# Patient Record
Sex: FEMALE | Race: WHITE | NOT HISPANIC OR LATINO | URBAN - METROPOLITAN AREA
[De-identification: names, ages, dates, MRNs, and addresses within clinical notes are randomized per-mention and may not be internally consistent; named-entity substitution may affect disease eponyms.]

---

## 2022-01-01 ENCOUNTER — INPATIENT (INPATIENT)
Facility: HOSPITAL | Age: 57
LOS: 11 days | End: 2022-08-11
Attending: INTERNAL MEDICINE | Admitting: INTERNAL MEDICINE

## 2022-01-01 ENCOUNTER — INPATIENT (INPATIENT)
Facility: HOSPITAL | Age: 57
LOS: 7 days | Discharge: HOME | End: 2022-07-22
Attending: INTERNAL MEDICINE | Admitting: INTERNAL MEDICINE

## 2022-01-01 ENCOUNTER — TRANSCRIPTION ENCOUNTER (OUTPATIENT)
Age: 57
End: 2022-01-01

## 2022-01-01 ENCOUNTER — RESULT REVIEW (OUTPATIENT)
Age: 57
End: 2022-01-01

## 2022-01-01 VITALS
SYSTOLIC BLOOD PRESSURE: 200 MMHG | DIASTOLIC BLOOD PRESSURE: 93 MMHG | HEIGHT: 65 IN | TEMPERATURE: 97 F | WEIGHT: 293 LBS | HEART RATE: 110 BPM | OXYGEN SATURATION: 97 % | RESPIRATION RATE: 20 BRPM

## 2022-01-01 VITALS
TEMPERATURE: 97 F | HEIGHT: 65 IN | RESPIRATION RATE: 20 BRPM | SYSTOLIC BLOOD PRESSURE: 115 MMHG | WEIGHT: 293 LBS | OXYGEN SATURATION: 99 % | HEART RATE: 89 BPM | DIASTOLIC BLOOD PRESSURE: 51 MMHG

## 2022-01-01 VITALS — RESPIRATION RATE: 28 BRPM

## 2022-01-01 VITALS — DIASTOLIC BLOOD PRESSURE: 79 MMHG | SYSTOLIC BLOOD PRESSURE: 175 MMHG | HEART RATE: 84 BPM

## 2022-01-01 DIAGNOSIS — E87.2 ACIDOSIS: ICD-10-CM

## 2022-01-01 DIAGNOSIS — Z66 DO NOT RESUSCITATE: ICD-10-CM

## 2022-01-01 DIAGNOSIS — B95.1 STREPTOCOCCUS, GROUP B, AS THE CAUSE OF DISEASES CLASSIFIED ELSEWHERE: ICD-10-CM

## 2022-01-01 DIAGNOSIS — E83.52 HYPERCALCEMIA: ICD-10-CM

## 2022-01-01 DIAGNOSIS — Z79.890 HORMONE REPLACEMENT THERAPY: ICD-10-CM

## 2022-01-01 DIAGNOSIS — E66.01 MORBID (SEVERE) OBESITY DUE TO EXCESS CALORIES: ICD-10-CM

## 2022-01-01 DIAGNOSIS — I10 ESSENTIAL (PRIMARY) HYPERTENSION: ICD-10-CM

## 2022-01-01 DIAGNOSIS — C56.3 MALIGNANT NEOPLASM OF BILATERAL OVARIES: ICD-10-CM

## 2022-01-01 DIAGNOSIS — D50.9 IRON DEFICIENCY ANEMIA, UNSPECIFIED: ICD-10-CM

## 2022-01-01 DIAGNOSIS — D75.839 THROMBOCYTOSIS, UNSPECIFIED: ICD-10-CM

## 2022-01-01 DIAGNOSIS — M32.9 SYSTEMIC LUPUS ERYTHEMATOSUS, UNSPECIFIED: ICD-10-CM

## 2022-01-01 DIAGNOSIS — R65.10 SYSTEMIC INFLAMMATORY RESPONSE SYNDROME (SIRS) OF NON-INFECTIOUS ORIGIN WITHOUT ACUTE ORGAN DYSFUNCTION: ICD-10-CM

## 2022-01-01 DIAGNOSIS — E03.9 HYPOTHYROIDISM, UNSPECIFIED: ICD-10-CM

## 2022-01-01 DIAGNOSIS — U07.1 COVID-19: ICD-10-CM

## 2022-01-01 DIAGNOSIS — C85.90 NON-HODGKIN LYMPHOMA, UNSPECIFIED, UNSPECIFIED SITE: ICD-10-CM

## 2022-01-01 DIAGNOSIS — D72.829 ELEVATED WHITE BLOOD CELL COUNT, UNSPECIFIED: ICD-10-CM

## 2022-01-01 DIAGNOSIS — C78.02 SECONDARY MALIGNANT NEOPLASM OF LEFT LUNG: ICD-10-CM

## 2022-01-01 DIAGNOSIS — N17.9 ACUTE KIDNEY FAILURE, UNSPECIFIED: ICD-10-CM

## 2022-01-01 DIAGNOSIS — C80.1 MALIGNANT (PRIMARY) NEOPLASM, UNSPECIFIED: ICD-10-CM

## 2022-01-01 DIAGNOSIS — R19.7 DIARRHEA, UNSPECIFIED: ICD-10-CM

## 2022-01-01 DIAGNOSIS — R65.21 SEVERE SEPSIS WITH SEPTIC SHOCK: ICD-10-CM

## 2022-01-01 DIAGNOSIS — N17.0 ACUTE KIDNEY FAILURE WITH TUBULAR NECROSIS: ICD-10-CM

## 2022-01-01 DIAGNOSIS — D84.9 IMMUNODEFICIENCY, UNSPECIFIED: ICD-10-CM

## 2022-01-01 DIAGNOSIS — J69.0 PNEUMONITIS DUE TO INHALATION OF FOOD AND VOMIT: ICD-10-CM

## 2022-01-01 DIAGNOSIS — E83.39 OTHER DISORDERS OF PHOSPHORUS METABOLISM: ICD-10-CM

## 2022-01-01 DIAGNOSIS — R11.2 NAUSEA WITH VOMITING, UNSPECIFIED: ICD-10-CM

## 2022-01-01 DIAGNOSIS — E87.1 HYPO-OSMOLALITY AND HYPONATREMIA: ICD-10-CM

## 2022-01-01 DIAGNOSIS — E83.42 HYPOMAGNESEMIA: ICD-10-CM

## 2022-01-01 DIAGNOSIS — J12.82 PNEUMONIA DUE TO CORONAVIRUS DISEASE 2019: ICD-10-CM

## 2022-01-01 DIAGNOSIS — D68.61 ANTIPHOSPHOLIPID SYNDROME: ICD-10-CM

## 2022-01-01 DIAGNOSIS — R82.71 BACTERIURIA: ICD-10-CM

## 2022-01-01 DIAGNOSIS — R19.00 INTRA-ABDOMINAL AND PELVIC SWELLING, MASS AND LUMP, UNSPECIFIED SITE: ICD-10-CM

## 2022-01-01 DIAGNOSIS — N39.0 URINARY TRACT INFECTION, SITE NOT SPECIFIED: ICD-10-CM

## 2022-01-01 DIAGNOSIS — E87.6 HYPOKALEMIA: ICD-10-CM

## 2022-01-01 DIAGNOSIS — G92.8 OTHER TOXIC ENCEPHALOPATHY: ICD-10-CM

## 2022-01-01 DIAGNOSIS — C79.82 SECONDARY MALIGNANT NEOPLASM OF GENITAL ORGANS: ICD-10-CM

## 2022-01-01 DIAGNOSIS — E46 UNSPECIFIED PROTEIN-CALORIE MALNUTRITION: ICD-10-CM

## 2022-01-01 DIAGNOSIS — J96.01 ACUTE RESPIRATORY FAILURE WITH HYPOXIA: ICD-10-CM

## 2022-01-01 DIAGNOSIS — A41.9 SEPSIS, UNSPECIFIED ORGANISM: ICD-10-CM

## 2022-01-01 DIAGNOSIS — R94.5 ABNORMAL RESULTS OF LIVER FUNCTION STUDIES: ICD-10-CM

## 2022-01-01 LAB
24R-OH-CALCIDIOL SERPL-MCNC: 74 NG/ML — SIGNIFICANT CHANGE UP (ref 30–80)
ACANTHOCYTES BLD QL SMEAR: SIGNIFICANT CHANGE UP
ACANTHOCYTES BLD QL SMEAR: SLIGHT — SIGNIFICANT CHANGE UP
ALBUMIN SERPL ELPH-MCNC: 2.3 G/DL — LOW (ref 3.5–5.2)
ALBUMIN SERPL ELPH-MCNC: 2.5 G/DL — LOW (ref 3.5–5.2)
ALBUMIN SERPL ELPH-MCNC: 2.6 G/DL — LOW (ref 3.5–5.2)
ALBUMIN SERPL ELPH-MCNC: 2.8 G/DL — LOW (ref 3.5–5.2)
ALBUMIN SERPL ELPH-MCNC: 3 G/DL — LOW (ref 3.5–5.2)
ALBUMIN SERPL ELPH-MCNC: 3.1 G/DL — LOW (ref 3.5–5.2)
ALBUMIN SERPL ELPH-MCNC: 3.3 G/DL — LOW (ref 3.5–5.2)
ALBUMIN SERPL ELPH-MCNC: 3.4 G/DL — LOW (ref 3.5–5.2)
ALBUMIN SERPL ELPH-MCNC: 3.4 G/DL — LOW (ref 3.5–5.2)
ALBUMIN SERPL ELPH-MCNC: 3.8 G/DL — SIGNIFICANT CHANGE UP (ref 3.5–5.2)
ALBUMIN SERPL ELPH-MCNC: 4 G/DL — SIGNIFICANT CHANGE UP (ref 3.5–5.2)
ALP SERPL-CCNC: 1396 U/L — HIGH (ref 30–115)
ALP SERPL-CCNC: 1465 U/L — HIGH (ref 30–115)
ALP SERPL-CCNC: 150 U/L — HIGH (ref 30–115)
ALP SERPL-CCNC: 158 U/L — HIGH (ref 30–115)
ALP SERPL-CCNC: 1588 U/L — HIGH (ref 30–115)
ALP SERPL-CCNC: 163 U/L — HIGH (ref 30–115)
ALP SERPL-CCNC: 166 U/L — HIGH (ref 30–115)
ALP SERPL-CCNC: 170 U/L — HIGH (ref 30–115)
ALP SERPL-CCNC: 177 U/L — HIGH (ref 30–115)
ALP SERPL-CCNC: 178 U/L — HIGH (ref 30–115)
ALP SERPL-CCNC: 181 U/L — HIGH (ref 30–115)
ALP SERPL-CCNC: 197 U/L — HIGH (ref 30–115)
ALP SERPL-CCNC: 378 U/L — HIGH (ref 30–115)
ALP SERPL-CCNC: 451 U/L — HIGH (ref 30–115)
ALP SERPL-CCNC: 457 U/L — HIGH (ref 30–115)
ALT FLD-CCNC: 20 U/L — SIGNIFICANT CHANGE UP (ref 0–41)
ALT FLD-CCNC: 21 U/L — SIGNIFICANT CHANGE UP (ref 0–41)
ALT FLD-CCNC: 22 U/L — SIGNIFICANT CHANGE UP (ref 0–41)
ALT FLD-CCNC: 22 U/L — SIGNIFICANT CHANGE UP (ref 0–41)
ALT FLD-CCNC: 24 U/L — SIGNIFICANT CHANGE UP (ref 0–41)
ALT FLD-CCNC: 25 U/L — SIGNIFICANT CHANGE UP (ref 0–41)
ALT FLD-CCNC: 26 U/L — SIGNIFICANT CHANGE UP (ref 0–41)
ALT FLD-CCNC: 27 U/L — SIGNIFICANT CHANGE UP (ref 0–41)
ALT FLD-CCNC: 27 U/L — SIGNIFICANT CHANGE UP (ref 0–41)
ALT FLD-CCNC: 28 U/L — SIGNIFICANT CHANGE UP (ref 0–41)
ALT FLD-CCNC: 34 U/L — SIGNIFICANT CHANGE UP (ref 0–41)
ALT FLD-CCNC: 35 U/L — SIGNIFICANT CHANGE UP (ref 0–41)
ALT FLD-CCNC: 36 U/L — SIGNIFICANT CHANGE UP (ref 0–41)
ALT FLD-CCNC: 44 U/L — HIGH (ref 0–41)
ALT FLD-CCNC: 62 U/L — HIGH (ref 0–41)
ANION GAP SERPL CALC-SCNC: 11 MMOL/L — SIGNIFICANT CHANGE UP (ref 7–14)
ANION GAP SERPL CALC-SCNC: 12 MMOL/L — SIGNIFICANT CHANGE UP (ref 7–14)
ANION GAP SERPL CALC-SCNC: 12 MMOL/L — SIGNIFICANT CHANGE UP (ref 7–14)
ANION GAP SERPL CALC-SCNC: 13 MMOL/L — SIGNIFICANT CHANGE UP (ref 7–14)
ANION GAP SERPL CALC-SCNC: 15 MMOL/L — HIGH (ref 7–14)
ANION GAP SERPL CALC-SCNC: 15 MMOL/L — HIGH (ref 7–14)
ANION GAP SERPL CALC-SCNC: 17 MMOL/L — HIGH (ref 7–14)
ANION GAP SERPL CALC-SCNC: 18 MMOL/L — HIGH (ref 7–14)
ANION GAP SERPL CALC-SCNC: 19 MMOL/L — HIGH (ref 7–14)
ANION GAP SERPL CALC-SCNC: 20 MMOL/L — HIGH (ref 7–14)
ANION GAP SERPL CALC-SCNC: 20 MMOL/L — HIGH (ref 7–14)
ANION GAP SERPL CALC-SCNC: 21 MMOL/L — HIGH (ref 7–14)
ANION GAP SERPL CALC-SCNC: 23 MMOL/L — HIGH (ref 7–14)
ANION GAP SERPL CALC-SCNC: 23 MMOL/L — HIGH (ref 7–14)
ANION GAP SERPL CALC-SCNC: 24 MMOL/L — HIGH (ref 7–14)
ANION GAP SERPL CALC-SCNC: 24 MMOL/L — HIGH (ref 7–14)
ANION GAP SERPL CALC-SCNC: 26 MMOL/L — HIGH (ref 7–14)
ANION GAP SERPL CALC-SCNC: 27 MMOL/L — HIGH (ref 7–14)
ANION GAP SERPL CALC-SCNC: 32 MMOL/L — HIGH (ref 7–14)
ANISOCYTOSIS BLD QL: SIGNIFICANT CHANGE UP
ANISOCYTOSIS BLD QL: SLIGHT — SIGNIFICANT CHANGE UP
APPEARANCE UR: ABNORMAL
APPEARANCE UR: ABNORMAL
APTT BLD: 29 SEC — SIGNIFICANT CHANGE UP (ref 27–39.2)
APTT BLD: 30.4 SEC — SIGNIFICANT CHANGE UP (ref 27–39.2)
APTT BLD: 46.3 SEC — HIGH (ref 27–39.2)
AST SERPL-CCNC: 101 U/L — HIGH (ref 0–41)
AST SERPL-CCNC: 27 U/L — SIGNIFICANT CHANGE UP (ref 0–41)
AST SERPL-CCNC: 27 U/L — SIGNIFICANT CHANGE UP (ref 0–41)
AST SERPL-CCNC: 273 U/L — HIGH (ref 0–41)
AST SERPL-CCNC: 28 U/L — SIGNIFICANT CHANGE UP (ref 0–41)
AST SERPL-CCNC: 31 U/L — SIGNIFICANT CHANGE UP (ref 0–41)
AST SERPL-CCNC: 32 U/L — SIGNIFICANT CHANGE UP (ref 0–41)
AST SERPL-CCNC: 39 U/L — SIGNIFICANT CHANGE UP (ref 0–41)
AST SERPL-CCNC: 45 U/L — HIGH (ref 0–41)
AST SERPL-CCNC: 50 U/L — HIGH (ref 0–41)
AST SERPL-CCNC: 58 U/L — HIGH (ref 0–41)
AST SERPL-CCNC: 58 U/L — HIGH (ref 0–41)
AST SERPL-CCNC: 59 U/L — HIGH (ref 0–41)
AST SERPL-CCNC: 637 U/L — HIGH (ref 0–41)
AST SERPL-CCNC: 73 U/L — HIGH (ref 0–41)
BACTERIA # UR AUTO: ABNORMAL
BACTERIA # UR AUTO: ABNORMAL
BASE EXCESS BLDA CALC-SCNC: -20.1 MMOL/L — LOW (ref -2–3)
BASE EXCESS BLDV CALC-SCNC: -3.9 MMOL/L — LOW (ref -2–3)
BASOPHILS # BLD AUTO: 0 K/UL — SIGNIFICANT CHANGE UP (ref 0–0.2)
BASOPHILS # BLD AUTO: 0.04 K/UL — SIGNIFICANT CHANGE UP (ref 0–0.2)
BASOPHILS # BLD AUTO: 0.06 K/UL — SIGNIFICANT CHANGE UP (ref 0–0.2)
BASOPHILS # BLD AUTO: 0.07 K/UL — SIGNIFICANT CHANGE UP (ref 0–0.2)
BASOPHILS # BLD AUTO: 0.07 K/UL — SIGNIFICANT CHANGE UP (ref 0–0.2)
BASOPHILS # BLD AUTO: 0.08 K/UL — SIGNIFICANT CHANGE UP (ref 0–0.2)
BASOPHILS # BLD AUTO: 0.36 K/UL — HIGH (ref 0–0.2)
BASOPHILS NFR BLD AUTO: 0 % — SIGNIFICANT CHANGE UP (ref 0–1)
BASOPHILS NFR BLD AUTO: 0.1 % — SIGNIFICANT CHANGE UP (ref 0–1)
BASOPHILS NFR BLD AUTO: 0.2 % — SIGNIFICANT CHANGE UP (ref 0–1)
BASOPHILS NFR BLD AUTO: 0.2 % — SIGNIFICANT CHANGE UP (ref 0–1)
BASOPHILS NFR BLD AUTO: 0.6 % — SIGNIFICANT CHANGE UP (ref 0–1)
BASOPHILS NFR BLD AUTO: 0.8 % — SIGNIFICANT CHANGE UP (ref 0–1)
BCA 255 TISS QL IMSTN: 52.2 U/ML — HIGH
BCR/ABL BY RT - PCR QUANTITATIVE: SIGNIFICANT CHANGE UP
BILIRUB DIRECT SERPL-MCNC: 1 MG/DL — HIGH (ref 0–0.3)
BILIRUB DIRECT SERPL-MCNC: <0.2 MG/DL — SIGNIFICANT CHANGE UP (ref 0–0.3)
BILIRUB DIRECT SERPL-MCNC: <0.2 MG/DL — SIGNIFICANT CHANGE UP (ref 0–0.3)
BILIRUB INDIRECT FLD-MCNC: 0.3 MG/DL — SIGNIFICANT CHANGE UP (ref 0.2–1.2)
BILIRUB INDIRECT FLD-MCNC: >0.1 MG/DL — LOW (ref 0.2–1.2)
BILIRUB INDIRECT FLD-MCNC: >0.2 MG/DL — SIGNIFICANT CHANGE UP (ref 0.2–1.2)
BILIRUB SERPL-MCNC: 0.2 MG/DL — SIGNIFICANT CHANGE UP (ref 0.2–1.2)
BILIRUB SERPL-MCNC: 0.3 MG/DL — SIGNIFICANT CHANGE UP (ref 0.2–1.2)
BILIRUB SERPL-MCNC: 0.4 MG/DL — SIGNIFICANT CHANGE UP (ref 0.2–1.2)
BILIRUB SERPL-MCNC: 0.5 MG/DL — SIGNIFICANT CHANGE UP (ref 0.2–1.2)
BILIRUB SERPL-MCNC: 0.5 MG/DL — SIGNIFICANT CHANGE UP (ref 0.2–1.2)
BILIRUB SERPL-MCNC: 0.7 MG/DL — SIGNIFICANT CHANGE UP (ref 0.2–1.2)
BILIRUB SERPL-MCNC: 1.3 MG/DL — HIGH (ref 0.2–1.2)
BILIRUB SERPL-MCNC: 1.3 MG/DL — HIGH (ref 0.2–1.2)
BILIRUB SERPL-MCNC: 1.4 MG/DL — HIGH (ref 0.2–1.2)
BILIRUB UR-MCNC: ABNORMAL
BILIRUB UR-MCNC: ABNORMAL
BLD GP AB SCN SERPL QL: SIGNIFICANT CHANGE UP
BUN SERPL-MCNC: 12 MG/DL — SIGNIFICANT CHANGE UP (ref 10–20)
BUN SERPL-MCNC: 13 MG/DL — SIGNIFICANT CHANGE UP (ref 10–20)
BUN SERPL-MCNC: 14 MG/DL — SIGNIFICANT CHANGE UP (ref 10–20)
BUN SERPL-MCNC: 15 MG/DL — SIGNIFICANT CHANGE UP (ref 10–20)
BUN SERPL-MCNC: 16 MG/DL — SIGNIFICANT CHANGE UP (ref 10–20)
BUN SERPL-MCNC: 19 MG/DL — SIGNIFICANT CHANGE UP (ref 10–20)
BUN SERPL-MCNC: 20 MG/DL — SIGNIFICANT CHANGE UP (ref 10–20)
BUN SERPL-MCNC: 21 MG/DL — HIGH (ref 10–20)
BUN SERPL-MCNC: 21 MG/DL — HIGH (ref 10–20)
BUN SERPL-MCNC: 22 MG/DL — HIGH (ref 10–20)
BUN SERPL-MCNC: 22 MG/DL — HIGH (ref 10–20)
BUN SERPL-MCNC: 23 MG/DL — HIGH (ref 10–20)
BUN SERPL-MCNC: 24 MG/DL — HIGH (ref 10–20)
BUN SERPL-MCNC: 25 MG/DL — HIGH (ref 10–20)
BUN SERPL-MCNC: 26 MG/DL — HIGH (ref 10–20)
BUN SERPL-MCNC: 26 MG/DL — HIGH (ref 10–20)
BUN SERPL-MCNC: 27 MG/DL — HIGH (ref 10–20)
BUN SERPL-MCNC: 28 MG/DL — HIGH (ref 10–20)
BUN SERPL-MCNC: 36 MG/DL — HIGH (ref 10–20)
BUN SERPL-MCNC: 50 MG/DL — HIGH (ref 10–20)
BUN SERPL-MCNC: 53 MG/DL — HIGH (ref 10–20)
BURR CELLS BLD QL SMEAR: PRESENT — SIGNIFICANT CHANGE UP
C DIFF BY PCR RESULT: NEGATIVE — SIGNIFICANT CHANGE UP
C DIFF TOX GENS STL QL NAA+PROBE: SIGNIFICANT CHANGE UP
CA-I BLD-SCNC: 6.2 MG/DL — HIGH (ref 4.5–5.6)
CA-I BLD-SCNC: 6.2 MG/DL — HIGH (ref 4.5–5.6)
CA-I SERPL-SCNC: 1.64 MMOL/L — CRITICAL HIGH (ref 1.15–1.33)
CALCIUM SERPL-MCNC: 10.4 MG/DL — HIGH (ref 8.5–10.1)
CALCIUM SERPL-MCNC: 10.8 MG/DL — HIGH (ref 8.5–10.1)
CALCIUM SERPL-MCNC: 10.8 MG/DL — HIGH (ref 8.5–10.1)
CALCIUM SERPL-MCNC: 11 MG/DL — HIGH (ref 8.5–10.1)
CALCIUM SERPL-MCNC: 11.1 MG/DL — HIGH (ref 8.5–10.1)
CALCIUM SERPL-MCNC: 11.7 MG/DL — HIGH (ref 8.5–10.1)
CALCIUM SERPL-MCNC: 12.1 MG/DL — HIGH (ref 8.5–10.1)
CALCIUM SERPL-MCNC: 12.2 MG/DL — HIGH (ref 8.4–10.5)
CALCIUM SERPL-MCNC: 12.5 MG/DL — HIGH (ref 8.5–10.1)
CALCIUM SERPL-MCNC: 13.3 MG/DL — HIGH (ref 8.5–10.1)
CALCIUM SERPL-MCNC: 7.6 MG/DL — LOW (ref 8.5–10.1)
CALCIUM SERPL-MCNC: 7.6 MG/DL — LOW (ref 8.5–10.1)
CALCIUM SERPL-MCNC: 7.7 MG/DL — LOW (ref 8.5–10.1)
CALCIUM SERPL-MCNC: 7.8 MG/DL — LOW (ref 8.5–10.1)
CALCIUM SERPL-MCNC: 8 MG/DL — LOW (ref 8.5–10.1)
CALCIUM SERPL-MCNC: 8 MG/DL — LOW (ref 8.5–10.1)
CALCIUM SERPL-MCNC: 8.2 MG/DL — LOW (ref 8.5–10.1)
CALCIUM SERPL-MCNC: 8.3 MG/DL — LOW (ref 8.5–10.1)
CALCIUM SERPL-MCNC: 8.4 MG/DL — LOW (ref 8.5–10.1)
CALCIUM SERPL-MCNC: 8.6 MG/DL — SIGNIFICANT CHANGE UP (ref 8.5–10.1)
CALRETICULIN INTERPRETATION: SIGNIFICANT CHANGE UP
CANCER AG125 SERPL-ACNC: 33 U/ML — SIGNIFICANT CHANGE UP
CANCER AG19-9 SERPL-ACNC: 19 U/ML — SIGNIFICANT CHANGE UP
CANCER AG27-29 SERPL-ACNC: 196.5 U/ML — HIGH (ref 0–38.6)
CEA SERPL-MCNC: 5.5 NG/ML — HIGH (ref 0–3.8)
CHLORIDE SERPL-SCNC: 100 MMOL/L — SIGNIFICANT CHANGE UP (ref 98–110)
CHLORIDE SERPL-SCNC: 101 MMOL/L — SIGNIFICANT CHANGE UP (ref 98–110)
CHLORIDE SERPL-SCNC: 101 MMOL/L — SIGNIFICANT CHANGE UP (ref 98–110)
CHLORIDE SERPL-SCNC: 91 MMOL/L — LOW (ref 98–110)
CHLORIDE SERPL-SCNC: 91 MMOL/L — LOW (ref 98–110)
CHLORIDE SERPL-SCNC: 92 MMOL/L — LOW (ref 98–110)
CHLORIDE SERPL-SCNC: 94 MMOL/L — LOW (ref 98–110)
CHLORIDE SERPL-SCNC: 94 MMOL/L — LOW (ref 98–110)
CHLORIDE SERPL-SCNC: 95 MMOL/L — LOW (ref 98–110)
CHLORIDE SERPL-SCNC: 96 MMOL/L — LOW (ref 98–110)
CHLORIDE SERPL-SCNC: 97 MMOL/L — LOW (ref 98–110)
CHLORIDE SERPL-SCNC: 98 MMOL/L — SIGNIFICANT CHANGE UP (ref 98–110)
CHLORIDE SERPL-SCNC: 99 MMOL/L — SIGNIFICANT CHANGE UP (ref 98–110)
CK SERPL-CCNC: 161 U/L — SIGNIFICANT CHANGE UP (ref 0–225)
CK SERPL-CCNC: 441 U/L — HIGH (ref 0–225)
CO2 SERPL-SCNC: 10 MMOL/L — LOW (ref 17–32)
CO2 SERPL-SCNC: 14 MMOL/L — LOW (ref 17–32)
CO2 SERPL-SCNC: 18 MMOL/L — SIGNIFICANT CHANGE UP (ref 17–32)
CO2 SERPL-SCNC: 19 MMOL/L — SIGNIFICANT CHANGE UP (ref 17–32)
CO2 SERPL-SCNC: 19 MMOL/L — SIGNIFICANT CHANGE UP (ref 17–32)
CO2 SERPL-SCNC: 20 MMOL/L — SIGNIFICANT CHANGE UP (ref 17–32)
CO2 SERPL-SCNC: 20 MMOL/L — SIGNIFICANT CHANGE UP (ref 17–32)
CO2 SERPL-SCNC: 21 MMOL/L — SIGNIFICANT CHANGE UP (ref 17–32)
CO2 SERPL-SCNC: 22 MMOL/L — SIGNIFICANT CHANGE UP (ref 17–32)
CO2 SERPL-SCNC: 22 MMOL/L — SIGNIFICANT CHANGE UP (ref 17–32)
CO2 SERPL-SCNC: 23 MMOL/L — SIGNIFICANT CHANGE UP (ref 17–32)
CO2 SERPL-SCNC: 24 MMOL/L — SIGNIFICANT CHANGE UP (ref 17–32)
CO2 SERPL-SCNC: 26 MMOL/L — SIGNIFICANT CHANGE UP (ref 17–32)
CO2 SERPL-SCNC: 8 MMOL/L — CRITICAL LOW (ref 17–32)
COLOR SPEC: YELLOW — SIGNIFICANT CHANGE UP
COLOR SPEC: YELLOW — SIGNIFICANT CHANGE UP
CREAT SERPL-MCNC: 0.8 MG/DL — SIGNIFICANT CHANGE UP (ref 0.7–1.5)
CREAT SERPL-MCNC: 0.9 MG/DL — SIGNIFICANT CHANGE UP (ref 0.7–1.5)
CREAT SERPL-MCNC: 1 MG/DL — SIGNIFICANT CHANGE UP (ref 0.7–1.5)
CREAT SERPL-MCNC: 1.1 MG/DL — SIGNIFICANT CHANGE UP (ref 0.7–1.5)
CREAT SERPL-MCNC: 1.2 MG/DL — SIGNIFICANT CHANGE UP (ref 0.7–1.5)
CREAT SERPL-MCNC: 1.2 MG/DL — SIGNIFICANT CHANGE UP (ref 0.7–1.5)
CREAT SERPL-MCNC: 1.3 MG/DL — SIGNIFICANT CHANGE UP (ref 0.7–1.5)
CREAT SERPL-MCNC: 1.4 MG/DL — SIGNIFICANT CHANGE UP (ref 0.7–1.5)
CREAT SERPL-MCNC: 1.4 MG/DL — SIGNIFICANT CHANGE UP (ref 0.7–1.5)
CREAT SERPL-MCNC: 1.5 MG/DL — SIGNIFICANT CHANGE UP (ref 0.7–1.5)
CREAT SERPL-MCNC: 1.8 MG/DL — HIGH (ref 0.7–1.5)
CREAT SERPL-MCNC: 2.2 MG/DL — HIGH (ref 0.7–1.5)
CREAT SERPL-MCNC: 3 MG/DL — HIGH (ref 0.7–1.5)
CREAT SERPL-MCNC: 3.2 MG/DL — HIGH (ref 0.7–1.5)
CRP SERPL-MCNC: 300.2 MG/L — HIGH
CULTURE RESULTS: SIGNIFICANT CHANGE UP
D DIMER BLD IA.RAPID-MCNC: 1595 NG/ML DDU — HIGH (ref 0–230)
D DIMER BLD IA.RAPID-MCNC: 2880 NG/ML DDU — HIGH (ref 0–230)
D DIMER BLD IA.RAPID-MCNC: 588 NG/ML DDU — HIGH (ref 0–230)
DACRYOCYTES BLD QL SMEAR: SIGNIFICANT CHANGE UP
DIFF PNL FLD: ABNORMAL
DIFF PNL FLD: ABNORMAL
DIR ANTIGLOB POLYSPECIFIC INTERPRETATION: SIGNIFICANT CHANGE UP
EGFR: 16 ML/MIN/1.73M2 — LOW
EGFR: 18 ML/MIN/1.73M2 — LOW
EGFR: 26 ML/MIN/1.73M2 — LOW
EGFR: 32 ML/MIN/1.73M2 — LOW
EGFR: 40 ML/MIN/1.73M2 — LOW
EGFR: 44 ML/MIN/1.73M2 — LOW
EGFR: 44 ML/MIN/1.73M2 — LOW
EGFR: 48 ML/MIN/1.73M2 — LOW
EGFR: 53 ML/MIN/1.73M2 — LOW
EGFR: 53 ML/MIN/1.73M2 — LOW
EGFR: 59 ML/MIN/1.73M2 — LOW
EGFR: 66 ML/MIN/1.73M2 — SIGNIFICANT CHANGE UP
EGFR: 75 ML/MIN/1.73M2 — SIGNIFICANT CHANGE UP
EGFR: 86 ML/MIN/1.73M2 — SIGNIFICANT CHANGE UP
EOSINOPHIL # BLD AUTO: 0 K/UL — SIGNIFICANT CHANGE UP (ref 0–0.7)
EOSINOPHIL # BLD AUTO: 0.06 K/UL — SIGNIFICANT CHANGE UP (ref 0–0.7)
EOSINOPHIL # BLD AUTO: 0.09 K/UL — SIGNIFICANT CHANGE UP (ref 0–0.7)
EOSINOPHIL # BLD AUTO: 0.1 K/UL — SIGNIFICANT CHANGE UP (ref 0–0.7)
EOSINOPHIL # BLD AUTO: 0.12 K/UL — SIGNIFICANT CHANGE UP (ref 0–0.7)
EOSINOPHIL # BLD AUTO: 0.17 K/UL — SIGNIFICANT CHANGE UP (ref 0–0.7)
EOSINOPHIL # BLD AUTO: 0.18 K/UL — SIGNIFICANT CHANGE UP (ref 0–0.7)
EOSINOPHIL # BLD AUTO: 0.49 K/UL — SIGNIFICANT CHANGE UP (ref 0–0.7)
EOSINOPHIL # BLD AUTO: 1.6 K/UL — HIGH (ref 0–0.7)
EOSINOPHIL NFR BLD AUTO: 0 % — SIGNIFICANT CHANGE UP (ref 0–8)
EOSINOPHIL NFR BLD AUTO: 0.1 % — SIGNIFICANT CHANGE UP (ref 0–8)
EOSINOPHIL NFR BLD AUTO: 0.2 % — SIGNIFICANT CHANGE UP (ref 0–8)
EOSINOPHIL NFR BLD AUTO: 0.3 % — SIGNIFICANT CHANGE UP (ref 0–8)
EOSINOPHIL NFR BLD AUTO: 0.7 % — SIGNIFICANT CHANGE UP (ref 0–8)
EOSINOPHIL NFR BLD AUTO: 0.9 % — SIGNIFICANT CHANGE UP (ref 0–8)
EOSINOPHIL NFR BLD AUTO: 4.1 % — SIGNIFICANT CHANGE UP (ref 0–8)
EPI CELLS # UR: 5 /HPF — SIGNIFICANT CHANGE UP (ref 0–5)
EPI CELLS # UR: ABNORMAL /HPF
ERYTHROCYTE [SEDIMENTATION RATE] IN BLOOD: 80 MM/HR — HIGH (ref 0–20)
FERRITIN SERPL-MCNC: 714 NG/ML — HIGH (ref 15–150)
FERRITIN SERPL-MCNC: 85 NG/ML — SIGNIFICANT CHANGE UP (ref 15–150)
FERRITIN SERPL-MCNC: 87 NG/ML — SIGNIFICANT CHANGE UP (ref 15–150)
FIBRINOGEN PPP-MCNC: 272 MG/DL — SIGNIFICANT CHANGE UP (ref 204.4–570.6)
FOLATE SERPL-MCNC: 6.7 NG/ML — SIGNIFICANT CHANGE UP
GAS PNL BLDA: SIGNIFICANT CHANGE UP
GAS PNL BLDV: 136 MMOL/L — SIGNIFICANT CHANGE UP (ref 136–145)
GAS PNL BLDV: SIGNIFICANT CHANGE UP
GIANT PLATELETS BLD QL SMEAR: PRESENT — SIGNIFICANT CHANGE UP
GLUCOSE BLDC GLUCOMTR-MCNC: 101 MG/DL — HIGH (ref 70–99)
GLUCOSE BLDC GLUCOMTR-MCNC: 102 MG/DL — HIGH (ref 70–99)
GLUCOSE BLDC GLUCOMTR-MCNC: 102 MG/DL — HIGH (ref 70–99)
GLUCOSE BLDC GLUCOMTR-MCNC: 103 MG/DL — HIGH (ref 70–99)
GLUCOSE BLDC GLUCOMTR-MCNC: 103 MG/DL — HIGH (ref 70–99)
GLUCOSE BLDC GLUCOMTR-MCNC: 104 MG/DL — HIGH (ref 70–99)
GLUCOSE BLDC GLUCOMTR-MCNC: 104 MG/DL — HIGH (ref 70–99)
GLUCOSE BLDC GLUCOMTR-MCNC: 105 MG/DL — HIGH (ref 70–99)
GLUCOSE BLDC GLUCOMTR-MCNC: 106 MG/DL — HIGH (ref 70–99)
GLUCOSE BLDC GLUCOMTR-MCNC: 108 MG/DL — HIGH (ref 70–99)
GLUCOSE BLDC GLUCOMTR-MCNC: 121 MG/DL — HIGH (ref 70–99)
GLUCOSE BLDC GLUCOMTR-MCNC: 122 MG/DL — HIGH (ref 70–99)
GLUCOSE BLDC GLUCOMTR-MCNC: 74 MG/DL — SIGNIFICANT CHANGE UP (ref 70–99)
GLUCOSE BLDC GLUCOMTR-MCNC: 80 MG/DL — SIGNIFICANT CHANGE UP (ref 70–99)
GLUCOSE BLDC GLUCOMTR-MCNC: 84 MG/DL — SIGNIFICANT CHANGE UP (ref 70–99)
GLUCOSE BLDC GLUCOMTR-MCNC: 88 MG/DL — SIGNIFICANT CHANGE UP (ref 70–99)
GLUCOSE BLDC GLUCOMTR-MCNC: 94 MG/DL — SIGNIFICANT CHANGE UP (ref 70–99)
GLUCOSE BLDC GLUCOMTR-MCNC: 95 MG/DL — SIGNIFICANT CHANGE UP (ref 70–99)
GLUCOSE BLDC GLUCOMTR-MCNC: 96 MG/DL — SIGNIFICANT CHANGE UP (ref 70–99)
GLUCOSE BLDC GLUCOMTR-MCNC: 96 MG/DL — SIGNIFICANT CHANGE UP (ref 70–99)
GLUCOSE BLDC GLUCOMTR-MCNC: 97 MG/DL — SIGNIFICANT CHANGE UP (ref 70–99)
GLUCOSE BLDC GLUCOMTR-MCNC: 99 MG/DL — SIGNIFICANT CHANGE UP (ref 70–99)
GLUCOSE SERPL-MCNC: 102 MG/DL — HIGH (ref 70–99)
GLUCOSE SERPL-MCNC: 104 MG/DL — HIGH (ref 70–99)
GLUCOSE SERPL-MCNC: 110 MG/DL — HIGH (ref 70–99)
GLUCOSE SERPL-MCNC: 255 MG/DL — HIGH (ref 70–99)
GLUCOSE SERPL-MCNC: 34 MG/DL — CRITICAL LOW (ref 70–99)
GLUCOSE SERPL-MCNC: 43 MG/DL — CRITICAL LOW (ref 70–99)
GLUCOSE SERPL-MCNC: 49 MG/DL — CRITICAL LOW (ref 70–99)
GLUCOSE SERPL-MCNC: 52 MG/DL — CRITICAL LOW (ref 70–99)
GLUCOSE SERPL-MCNC: 60 MG/DL — LOW (ref 70–99)
GLUCOSE SERPL-MCNC: 65 MG/DL — LOW (ref 70–99)
GLUCOSE SERPL-MCNC: 66 MG/DL — LOW (ref 70–99)
GLUCOSE SERPL-MCNC: 72 MG/DL — SIGNIFICANT CHANGE UP (ref 70–99)
GLUCOSE SERPL-MCNC: 74 MG/DL — SIGNIFICANT CHANGE UP (ref 70–99)
GLUCOSE SERPL-MCNC: 74 MG/DL — SIGNIFICANT CHANGE UP (ref 70–99)
GLUCOSE SERPL-MCNC: 76 MG/DL — SIGNIFICANT CHANGE UP (ref 70–99)
GLUCOSE SERPL-MCNC: 79 MG/DL — SIGNIFICANT CHANGE UP (ref 70–99)
GLUCOSE SERPL-MCNC: 84 MG/DL — SIGNIFICANT CHANGE UP (ref 70–99)
GLUCOSE SERPL-MCNC: 87 MG/DL — SIGNIFICANT CHANGE UP (ref 70–99)
GLUCOSE SERPL-MCNC: 90 MG/DL — SIGNIFICANT CHANGE UP (ref 70–99)
GLUCOSE SERPL-MCNC: 92 MG/DL — SIGNIFICANT CHANGE UP (ref 70–99)
GLUCOSE SERPL-MCNC: 92 MG/DL — SIGNIFICANT CHANGE UP (ref 70–99)
GLUCOSE SERPL-MCNC: 93 MG/DL — SIGNIFICANT CHANGE UP (ref 70–99)
GLUCOSE SERPL-MCNC: 97 MG/DL — SIGNIFICANT CHANGE UP (ref 70–99)
GLUCOSE UR QL: NEGATIVE MG/DL — SIGNIFICANT CHANGE UP
GLUCOSE UR QL: NEGATIVE — SIGNIFICANT CHANGE UP
GRAM STN FLD: SIGNIFICANT CHANGE UP
HAPTOGLOB SERPL-MCNC: 262 MG/DL — HIGH (ref 34–200)
HCG SERPL QL: NEGATIVE — SIGNIFICANT CHANGE UP
HCG SERPL QL: NEGATIVE — SIGNIFICANT CHANGE UP
HCG SERPL-ACNC: <0.6 MIU/ML — SIGNIFICANT CHANGE UP
HCO3 BLDA-SCNC: 10 MMOL/L — CRITICAL LOW (ref 21–28)
HCO3 BLDV-SCNC: 19 MMOL/L — LOW (ref 22–29)
HCT VFR BLD CALC: 17.7 % — LOW (ref 37–47)
HCT VFR BLD CALC: 25.2 % — LOW (ref 37–47)
HCT VFR BLD CALC: 26 % — LOW (ref 37–47)
HCT VFR BLD CALC: 26.1 % — LOW (ref 37–47)
HCT VFR BLD CALC: 26.2 % — LOW (ref 37–47)
HCT VFR BLD CALC: 26.5 % — LOW (ref 37–47)
HCT VFR BLD CALC: 26.8 % — LOW (ref 37–47)
HCT VFR BLD CALC: 27 % — LOW (ref 37–47)
HCT VFR BLD CALC: 27.3 % — LOW (ref 37–47)
HCT VFR BLD CALC: 27.6 % — LOW (ref 37–47)
HCT VFR BLD CALC: 27.7 % — LOW (ref 37–47)
HCT VFR BLD CALC: 27.7 % — LOW (ref 37–47)
HCT VFR BLD CALC: 27.9 % — LOW (ref 37–47)
HCT VFR BLD CALC: 28.1 % — LOW (ref 37–47)
HCT VFR BLD CALC: 28.7 % — LOW (ref 37–47)
HCT VFR BLD CALC: 29.1 % — LOW (ref 37–47)
HCT VFR BLD CALC: 29.3 % — LOW (ref 37–47)
HCT VFR BLD CALC: 30.1 % — LOW (ref 37–47)
HCT VFR BLD CALC: 30.3 % — LOW (ref 37–47)
HCT VFR BLDA CALC: 40 % — SIGNIFICANT CHANGE UP (ref 39–51)
HCV AB S/CO SERPL IA: 0.03 COI — SIGNIFICANT CHANGE UP
HCV AB SERPL-IMP: SIGNIFICANT CHANGE UP
HGB BLD CALC-MCNC: 13.2 G/DL — SIGNIFICANT CHANGE UP (ref 12.6–17.4)
HGB BLD-MCNC: 5 G/DL — CRITICAL LOW (ref 12–16)
HGB BLD-MCNC: 8.4 G/DL — LOW (ref 12–16)
HGB BLD-MCNC: 8.5 G/DL — LOW (ref 12–16)
HGB BLD-MCNC: 8.6 G/DL — LOW (ref 12–16)
HGB BLD-MCNC: 8.8 G/DL — LOW (ref 12–16)
HGB BLD-MCNC: 8.8 G/DL — LOW (ref 12–16)
HGB BLD-MCNC: 8.9 G/DL — LOW (ref 12–16)
HGB BLD-MCNC: 8.9 G/DL — LOW (ref 12–16)
HGB BLD-MCNC: 9 G/DL — LOW (ref 12–16)
HGB BLD-MCNC: 9.1 G/DL — LOW (ref 12–16)
HGB BLD-MCNC: 9.3 G/DL — LOW (ref 12–16)
HGB BLD-MCNC: 9.4 G/DL — LOW (ref 12–16)
HGB BLD-MCNC: 9.5 G/DL — LOW (ref 12–16)
HGB BLD-MCNC: 9.6 G/DL — LOW (ref 12–16)
HGB BLD-MCNC: 9.8 G/DL — LOW (ref 12–16)
HGB BLD-MCNC: 9.9 G/DL — LOW (ref 12–16)
HOROWITZ INDEX BLDA+IHG-RTO: 100 — SIGNIFICANT CHANGE UP
HYALINE CASTS # UR AUTO: 3 /LPF — SIGNIFICANT CHANGE UP (ref 0–7)
HYPOCHROMIA BLD QL: SLIGHT — SIGNIFICANT CHANGE UP
HYPOCHROMIA BLD QL: SLIGHT — SIGNIFICANT CHANGE UP
HYPOGRAN NEUTS BLD QL SMEAR: PRESENT — SIGNIFICANT CHANGE UP
IMM GRANULOCYTES NFR BLD AUTO: 2 % — HIGH (ref 0.1–0.3)
IMM GRANULOCYTES NFR BLD AUTO: 2.2 % — HIGH (ref 0.1–0.3)
IMM GRANULOCYTES NFR BLD AUTO: 4.6 % — HIGH (ref 0.1–0.3)
IMM GRANULOCYTES NFR BLD AUTO: 6.6 % — HIGH (ref 0.1–0.3)
IMM GRANULOCYTES NFR BLD AUTO: 7.4 % — HIGH (ref 0.1–0.3)
IMM GRANULOCYTES NFR BLD AUTO: 7.4 % — HIGH (ref 0.1–0.3)
IMM GRANULOCYTES NFR BLD AUTO: 7.5 % — HIGH (ref 0.1–0.3)
IMM GRANULOCYTES NFR BLD AUTO: 9.1 % — HIGH (ref 0.1–0.3)
INHIBIN A SERPL-MCNC: 9.9 PG/ML — SIGNIFICANT CHANGE UP
INHIBIN B SERUM: 18.8 PG/ML — HIGH (ref 0–16.9)
INR BLD: 1.13 RATIO — SIGNIFICANT CHANGE UP (ref 0.65–1.3)
INR BLD: 1.4 RATIO — HIGH (ref 0.65–1.3)
INR BLD: 1.86 RATIO — HIGH (ref 0.65–1.3)
IRON SATN MFR SERPL: 10 % — LOW (ref 15–50)
IRON SATN MFR SERPL: 27 UG/DL — LOW (ref 35–150)
IRON SATN MFR SERPL: 31 UG/DL — LOW (ref 35–150)
IRON SATN MFR SERPL: 9 % — LOW (ref 15–50)
JAK2 P.V617F BLD/T QL: SIGNIFICANT CHANGE UP
KETONES UR-MCNC: 40
KETONES UR-MCNC: SIGNIFICANT CHANGE UP
LACTATE BLDV-MCNC: 1.9 MMOL/L — SIGNIFICANT CHANGE UP (ref 0.5–2)
LACTATE SERPL-SCNC: 12.2 MMOL/L — CRITICAL HIGH (ref 0.7–2)
LACTATE SERPL-SCNC: 2.2 MMOL/L — HIGH (ref 0.7–2)
LDH SERPL L TO P-CCNC: 1571 — HIGH (ref 50–242)
LDH SERPL L TO P-CCNC: 669 — HIGH (ref 50–242)
LDH SERPL L TO P-CCNC: >2500 — HIGH (ref 50–242)
LEUKOCYTE ESTERASE UR-ACNC: ABNORMAL
LEUKOCYTE ESTERASE UR-ACNC: ABNORMAL
LIDOCAIN IGE QN: 34 U/L — SIGNIFICANT CHANGE UP (ref 7–60)
LYMPHOCYTES # BLD AUTO: 0.34 K/UL — LOW (ref 1.2–3.4)
LYMPHOCYTES # BLD AUTO: 1.32 K/UL — SIGNIFICANT CHANGE UP (ref 1.2–3.4)
LYMPHOCYTES # BLD AUTO: 1.75 K/UL — SIGNIFICANT CHANGE UP (ref 1.2–3.4)
LYMPHOCYTES # BLD AUTO: 1.79 K/UL — SIGNIFICANT CHANGE UP (ref 1.2–3.4)
LYMPHOCYTES # BLD AUTO: 2.2 K/UL — SIGNIFICANT CHANGE UP (ref 1.2–3.4)
LYMPHOCYTES # BLD AUTO: 2.39 K/UL — SIGNIFICANT CHANGE UP (ref 1.2–3.4)
LYMPHOCYTES # BLD AUTO: 3 K/UL — SIGNIFICANT CHANGE UP (ref 1.2–3.4)
LYMPHOCYTES # BLD AUTO: 3.29 K/UL — SIGNIFICANT CHANGE UP (ref 1.2–3.4)
LYMPHOCYTES # BLD AUTO: 3.68 K/UL — HIGH (ref 1.2–3.4)
LYMPHOCYTES # BLD AUTO: 3.99 K/UL — HIGH (ref 1.2–3.4)
LYMPHOCYTES # BLD AUTO: 4 % — LOW (ref 20.5–51.1)
LYMPHOCYTES # BLD AUTO: 4.07 K/UL — HIGH (ref 1.2–3.4)
LYMPHOCYTES # BLD AUTO: 4.4 % — LOW (ref 20.5–51.1)
LYMPHOCYTES # BLD AUTO: 4.4 % — LOW (ref 20.5–51.1)
LYMPHOCYTES # BLD AUTO: 5.6 % — LOW (ref 20.5–51.1)
LYMPHOCYTES # BLD AUTO: 6.1 % — LOW (ref 20.5–51.1)
LYMPHOCYTES # BLD AUTO: 6.1 % — LOW (ref 20.5–51.1)
LYMPHOCYTES # BLD AUTO: 6.7 % — LOW (ref 20.5–51.1)
LYMPHOCYTES # BLD AUTO: 7.1 % — LOW (ref 20.5–51.1)
LYMPHOCYTES # BLD AUTO: 7.1 % — LOW (ref 20.5–51.1)
LYMPHOCYTES # BLD AUTO: 7.4 % — LOW (ref 20.5–51.1)
LYMPHOCYTES # BLD AUTO: 81.7 % — HIGH (ref 20.5–51.1)
MACROCYTES BLD QL: SLIGHT — SIGNIFICANT CHANGE UP
MACROCYTES BLD QL: SLIGHT — SIGNIFICANT CHANGE UP
MAGNESIUM SERPL-MCNC: 1.6 MG/DL — LOW (ref 1.8–2.4)
MAGNESIUM SERPL-MCNC: 1.7 MG/DL — LOW (ref 1.8–2.4)
MAGNESIUM SERPL-MCNC: 1.8 MG/DL — SIGNIFICANT CHANGE UP (ref 1.8–2.4)
MAGNESIUM SERPL-MCNC: 1.9 MG/DL — SIGNIFICANT CHANGE UP (ref 1.8–2.4)
MAGNESIUM SERPL-MCNC: 2 MG/DL — SIGNIFICANT CHANGE UP (ref 1.8–2.4)
MAGNESIUM SERPL-MCNC: 2 MG/DL — SIGNIFICANT CHANGE UP (ref 1.8–2.4)
MAGNESIUM SERPL-MCNC: 2.1 MG/DL — SIGNIFICANT CHANGE UP (ref 1.8–2.4)
MAGNESIUM SERPL-MCNC: 2.2 MG/DL — SIGNIFICANT CHANGE UP (ref 1.8–2.4)
MAGNESIUM SERPL-MCNC: 2.2 MG/DL — SIGNIFICANT CHANGE UP (ref 1.8–2.4)
MAGNESIUM SERPL-MCNC: 2.6 MG/DL — HIGH (ref 1.8–2.4)
MANUAL DIF COMMENT BLD-IMP: SIGNIFICANT CHANGE UP
MANUAL DIF COMMENT BLD-IMP: SIGNIFICANT CHANGE UP
MANUAL SMEAR VERIFICATION: SIGNIFICANT CHANGE UP
MCHC RBC-ENTMCNC: 24.2 PG — LOW (ref 27–31)
MCHC RBC-ENTMCNC: 24.3 PG — LOW (ref 27–31)
MCHC RBC-ENTMCNC: 24.5 PG — LOW (ref 27–31)
MCHC RBC-ENTMCNC: 24.7 PG — LOW (ref 27–31)
MCHC RBC-ENTMCNC: 24.8 PG — LOW (ref 27–31)
MCHC RBC-ENTMCNC: 24.9 PG — LOW (ref 27–31)
MCHC RBC-ENTMCNC: 25.1 PG — LOW (ref 27–31)
MCHC RBC-ENTMCNC: 25.1 PG — LOW (ref 27–31)
MCHC RBC-ENTMCNC: 25.2 PG — LOW (ref 27–31)
MCHC RBC-ENTMCNC: 25.3 PG — LOW (ref 27–31)
MCHC RBC-ENTMCNC: 25.5 PG — LOW (ref 27–31)
MCHC RBC-ENTMCNC: 25.6 PG — LOW (ref 27–31)
MCHC RBC-ENTMCNC: 28.2 G/DL — LOW (ref 32–37)
MCHC RBC-ENTMCNC: 31.3 G/DL — LOW (ref 32–37)
MCHC RBC-ENTMCNC: 31.7 G/DL — LOW (ref 32–37)
MCHC RBC-ENTMCNC: 31.7 G/DL — LOW (ref 32–37)
MCHC RBC-ENTMCNC: 31.9 G/DL — LOW (ref 32–37)
MCHC RBC-ENTMCNC: 32 G/DL — SIGNIFICANT CHANGE UP (ref 32–37)
MCHC RBC-ENTMCNC: 32.2 G/DL — SIGNIFICANT CHANGE UP (ref 32–37)
MCHC RBC-ENTMCNC: 32.3 G/DL — SIGNIFICANT CHANGE UP (ref 32–37)
MCHC RBC-ENTMCNC: 32.5 G/DL — SIGNIFICANT CHANGE UP (ref 32–37)
MCHC RBC-ENTMCNC: 32.6 G/DL — SIGNIFICANT CHANGE UP (ref 32–37)
MCHC RBC-ENTMCNC: 32.7 G/DL — SIGNIFICANT CHANGE UP (ref 32–37)
MCHC RBC-ENTMCNC: 32.8 G/DL — SIGNIFICANT CHANGE UP (ref 32–37)
MCHC RBC-ENTMCNC: 32.9 G/DL — SIGNIFICANT CHANGE UP (ref 32–37)
MCHC RBC-ENTMCNC: 33.1 G/DL — SIGNIFICANT CHANGE UP (ref 32–37)
MCHC RBC-ENTMCNC: 33.3 G/DL — SIGNIFICANT CHANGE UP (ref 32–37)
MCHC RBC-ENTMCNC: 33.7 G/DL — SIGNIFICANT CHANGE UP (ref 32–37)
MCHC RBC-ENTMCNC: 33.8 G/DL — SIGNIFICANT CHANGE UP (ref 32–37)
MCV RBC AUTO: 75 FL — LOW (ref 81–99)
MCV RBC AUTO: 75.5 FL — LOW (ref 81–99)
MCV RBC AUTO: 75.8 FL — LOW (ref 81–99)
MCV RBC AUTO: 75.8 FL — LOW (ref 81–99)
MCV RBC AUTO: 76.2 FL — LOW (ref 81–99)
MCV RBC AUTO: 76.4 FL — LOW (ref 81–99)
MCV RBC AUTO: 76.7 FL — LOW (ref 81–99)
MCV RBC AUTO: 77.2 FL — LOW (ref 81–99)
MCV RBC AUTO: 77.3 FL — LOW (ref 81–99)
MCV RBC AUTO: 77.4 FL — LOW (ref 81–99)
MCV RBC AUTO: 77.7 FL — LOW (ref 81–99)
MCV RBC AUTO: 77.8 FL — LOW (ref 81–99)
MCV RBC AUTO: 78 FL — LOW (ref 81–99)
MCV RBC AUTO: 78.3 FL — LOW (ref 81–99)
MCV RBC AUTO: 78.7 FL — LOW (ref 81–99)
MCV RBC AUTO: 79.1 FL — LOW (ref 81–99)
MCV RBC AUTO: 79.3 FL — LOW (ref 81–99)
MCV RBC AUTO: 80.5 FL — LOW (ref 81–99)
MCV RBC AUTO: 90.8 FL — SIGNIFICANT CHANGE UP (ref 81–99)
METAMYELOCYTES # FLD: 5.4 % — HIGH (ref 0–0)
METHYLMALONATE SERPL-SCNC: 337 NMOL/L — SIGNIFICANT CHANGE UP (ref 0–378)
MICROCYTES BLD QL: SLIGHT — SIGNIFICANT CHANGE UP
MONOCYTES # BLD AUTO: 0.1 K/UL — SIGNIFICANT CHANGE UP (ref 0.1–0.6)
MONOCYTES # BLD AUTO: 0.53 K/UL — SIGNIFICANT CHANGE UP (ref 0.1–0.6)
MONOCYTES # BLD AUTO: 1.41 K/UL — HIGH (ref 0.1–0.6)
MONOCYTES # BLD AUTO: 2.08 K/UL — HIGH (ref 0.1–0.6)
MONOCYTES # BLD AUTO: 2.17 K/UL — HIGH (ref 0.1–0.6)
MONOCYTES # BLD AUTO: 2.28 K/UL — HIGH (ref 0.1–0.6)
MONOCYTES # BLD AUTO: 2.36 K/UL — HIGH (ref 0.1–0.6)
MONOCYTES # BLD AUTO: 2.75 K/UL — HIGH (ref 0.1–0.6)
MONOCYTES # BLD AUTO: 2.87 K/UL — HIGH (ref 0.1–0.6)
MONOCYTES # BLD AUTO: 2.9 K/UL — HIGH (ref 0.1–0.6)
MONOCYTES # BLD AUTO: 3.05 K/UL — HIGH (ref 0.1–0.6)
MONOCYTES NFR BLD AUTO: 2.6 % — SIGNIFICANT CHANGE UP (ref 1.7–9.3)
MONOCYTES NFR BLD AUTO: 3.6 % — SIGNIFICANT CHANGE UP (ref 1.7–9.3)
MONOCYTES NFR BLD AUTO: 4.7 % — SIGNIFICANT CHANGE UP (ref 1.7–9.3)
MONOCYTES NFR BLD AUTO: 4.8 % — SIGNIFICANT CHANGE UP (ref 1.7–9.3)
MONOCYTES NFR BLD AUTO: 4.9 % — SIGNIFICANT CHANGE UP (ref 1.7–9.3)
MONOCYTES NFR BLD AUTO: 5.3 % — SIGNIFICANT CHANGE UP (ref 1.7–9.3)
MONOCYTES NFR BLD AUTO: 5.3 % — SIGNIFICANT CHANGE UP (ref 1.7–9.3)
MONOCYTES NFR BLD AUTO: 6.1 % — SIGNIFICANT CHANGE UP (ref 1.7–9.3)
MONOCYTES NFR BLD AUTO: 6.2 % — SIGNIFICANT CHANGE UP (ref 1.7–9.3)
MONOCYTES NFR BLD AUTO: 8.9 % — SIGNIFICANT CHANGE UP (ref 1.7–9.3)
MONOCYTES NFR BLD AUTO: 9.8 % — HIGH (ref 1.7–9.3)
MPL EXON 10 MUTATION: SIGNIFICANT CHANGE UP
MYELOCYTES NFR BLD: 1 % — HIGH (ref 0–0)
MYELOCYTES NFR BLD: 2.7 % — HIGH (ref 0–0)
NEUTROPHILS # BLD AUTO: 0.18 K/UL — LOW (ref 1.4–6.5)
NEUTROPHILS # BLD AUTO: 19.24 K/UL — HIGH (ref 1.4–6.5)
NEUTROPHILS # BLD AUTO: 23.2 K/UL — HIGH (ref 1.4–6.5)
NEUTROPHILS # BLD AUTO: 32.96 K/UL — HIGH (ref 1.4–6.5)
NEUTROPHILS # BLD AUTO: 39.96 K/UL — HIGH (ref 1.4–6.5)
NEUTROPHILS # BLD AUTO: 45.57 K/UL — HIGH (ref 1.4–6.5)
NEUTROPHILS # BLD AUTO: 46.77 K/UL — HIGH (ref 1.4–6.5)
NEUTROPHILS # BLD AUTO: 48.39 K/UL — HIGH (ref 1.4–6.5)
NEUTROPHILS # BLD AUTO: 50.02 K/UL — HIGH (ref 1.4–6.5)
NEUTROPHILS # BLD AUTO: 50.32 K/UL — HIGH (ref 1.4–6.5)
NEUTROPHILS # BLD AUTO: 7.16 K/UL — HIGH (ref 1.4–6.5)
NEUTROPHILS NFR BLD AUTO: 11.2 % — LOW (ref 42.2–75.2)
NEUTROPHILS NFR BLD AUTO: 72.3 % — SIGNIFICANT CHANGE UP (ref 42.2–75.2)
NEUTROPHILS NFR BLD AUTO: 79.6 % — HIGH (ref 42.2–75.2)
NEUTROPHILS NFR BLD AUTO: 79.7 % — HIGH (ref 42.2–75.2)
NEUTROPHILS NFR BLD AUTO: 79.9 % — HIGH (ref 42.2–75.2)
NEUTROPHILS NFR BLD AUTO: 80.8 % — HIGH (ref 42.2–75.2)
NEUTROPHILS NFR BLD AUTO: 81.6 % — HIGH (ref 42.2–75.2)
NEUTROPHILS NFR BLD AUTO: 83.4 % — HIGH (ref 42.2–75.2)
NEUTROPHILS NFR BLD AUTO: 83.8 % — HIGH (ref 42.2–75.2)
NEUTROPHILS NFR BLD AUTO: 84.4 % — HIGH (ref 42.2–75.2)
NEUTROPHILS NFR BLD AUTO: 92.1 % — HIGH (ref 42.2–75.2)
NEUTS BAND # BLD: 2.7 % — SIGNIFICANT CHANGE UP (ref 0–6)
NITRITE UR-MCNC: NEGATIVE — SIGNIFICANT CHANGE UP
NITRITE UR-MCNC: NEGATIVE — SIGNIFICANT CHANGE UP
NON-GYNECOLOGICAL CYTOLOGY STUDY: SIGNIFICANT CHANGE UP
NRBC # BLD: 0 /100 WBCS — SIGNIFICANT CHANGE UP (ref 0–0)
NRBC # BLD: 0 /100 — SIGNIFICANT CHANGE UP (ref 0–0)
NRBC # BLD: 0 /100 — SIGNIFICANT CHANGE UP (ref 0–0)
NRBC # BLD: 1 /100 WBCS — HIGH (ref 0–0)
NRBC # BLD: 1 /100 WBCS — HIGH (ref 0–0)
NRBC # BLD: 5 /100 — HIGH (ref 0–0)
NRBC # BLD: 54 /100 — HIGH (ref 0–0)
NRBC # BLD: 8 /100 WBCS — HIGH (ref 0–0)
NRBC # BLD: SIGNIFICANT CHANGE UP /100 WBCS (ref 0–0)
NRBC # BLD: SIGNIFICANT CHANGE UP /100 WBCS (ref 0–0)
OVALOCYTES BLD QL SMEAR: SIGNIFICANT CHANGE UP
PCO2 BLDA: 38 MMHG — SIGNIFICANT CHANGE UP (ref 25–48)
PCO2 BLDV: 29 MMHG — LOW (ref 39–42)
PH BLDA: 7.03 — CRITICAL LOW (ref 7.35–7.45)
PH BLDV: 7.43 — SIGNIFICANT CHANGE UP (ref 7.32–7.43)
PH UR: 5.5 — SIGNIFICANT CHANGE UP (ref 5–8)
PH UR: 5.5 — SIGNIFICANT CHANGE UP (ref 5–8)
PHOSPHATE SERPL-MCNC: 0.9 MG/DL — LOW (ref 2.1–4.9)
PHOSPHATE SERPL-MCNC: 1 MG/DL — LOW (ref 2.1–4.9)
PHOSPHATE SERPL-MCNC: 1.1 MG/DL — LOW (ref 2.1–4.9)
PHOSPHATE SERPL-MCNC: 1.2 MG/DL — LOW (ref 2.1–4.9)
PHOSPHATE SERPL-MCNC: 1.3 MG/DL — LOW (ref 2.1–4.9)
PHOSPHATE SERPL-MCNC: 1.4 MG/DL — LOW (ref 2.1–4.9)
PHOSPHATE SERPL-MCNC: 1.5 MG/DL — LOW (ref 2.1–4.9)
PHOSPHATE SERPL-MCNC: 1.7 MG/DL — LOW (ref 2.1–4.9)
PHOSPHATE SERPL-MCNC: 1.7 MG/DL — LOW (ref 2.1–4.9)
PHOSPHATE SERPL-MCNC: 2.3 MG/DL — SIGNIFICANT CHANGE UP (ref 2.1–4.9)
PHOSPHATE SERPL-MCNC: 3.6 MG/DL — SIGNIFICANT CHANGE UP (ref 2.1–4.9)
PHOSPHATE SERPL-MCNC: 6.5 MG/DL — HIGH (ref 2.1–4.9)
PHOSPHATE SERPL-MCNC: 7 MG/DL — HIGH (ref 2.1–4.9)
PLAT MORPH BLD: NORMAL — SIGNIFICANT CHANGE UP
PLAT MORPH BLD: SIGNIFICANT CHANGE UP
PLATELET # BLD AUTO: 113 K/UL — LOW (ref 130–400)
PLATELET # BLD AUTO: 176 K/UL — SIGNIFICANT CHANGE UP (ref 130–400)
PLATELET # BLD AUTO: 247 K/UL — SIGNIFICANT CHANGE UP (ref 130–400)
PLATELET # BLD AUTO: 261 K/UL — SIGNIFICANT CHANGE UP (ref 130–400)
PLATELET # BLD AUTO: 310 K/UL — SIGNIFICANT CHANGE UP (ref 130–400)
PLATELET # BLD AUTO: 316 K/UL — SIGNIFICANT CHANGE UP (ref 130–400)
PLATELET # BLD AUTO: 366 K/UL — SIGNIFICANT CHANGE UP (ref 130–400)
PLATELET # BLD AUTO: 369 K/UL — SIGNIFICANT CHANGE UP (ref 130–400)
PLATELET # BLD AUTO: 380 K/UL — SIGNIFICANT CHANGE UP (ref 130–400)
PLATELET # BLD AUTO: 385 K/UL — SIGNIFICANT CHANGE UP (ref 130–400)
PLATELET # BLD AUTO: 392 K/UL — SIGNIFICANT CHANGE UP (ref 130–400)
PLATELET # BLD AUTO: 398 K/UL — SIGNIFICANT CHANGE UP (ref 130–400)
PLATELET # BLD AUTO: 398 K/UL — SIGNIFICANT CHANGE UP (ref 130–400)
PLATELET # BLD AUTO: 414 K/UL — HIGH (ref 130–400)
PLATELET # BLD AUTO: 420 K/UL — HIGH (ref 130–400)
PLATELET # BLD AUTO: 488 K/UL — HIGH (ref 130–400)
PLATELET # BLD AUTO: 613 K/UL — HIGH (ref 130–400)
PLATELET # BLD AUTO: 696 K/UL — HIGH (ref 130–400)
PLATELET # BLD AUTO: 803 K/UL — HIGH (ref 130–400)
PLATELET CLUMP BLD QL SMEAR: ABNORMAL
PLATELET COUNT - ESTIMATE: ABNORMAL
PLATELET COUNT - ESTIMATE: NORMAL — SIGNIFICANT CHANGE UP
PO2 BLDA: 157 MMHG — HIGH (ref 83–108)
PO2 BLDV: 29 MMHG — SIGNIFICANT CHANGE UP
POIKILOCYTOSIS BLD QL AUTO: SIGNIFICANT CHANGE UP
POIKILOCYTOSIS BLD QL AUTO: SLIGHT — SIGNIFICANT CHANGE UP
POLYCHROMASIA BLD QL SMEAR: SIGNIFICANT CHANGE UP
POLYCHROMASIA BLD QL SMEAR: SLIGHT — SIGNIFICANT CHANGE UP
POLYCHROMASIA BLD QL SMEAR: SLIGHT — SIGNIFICANT CHANGE UP
POTASSIUM BLDV-SCNC: 4.6 MMOL/L — SIGNIFICANT CHANGE UP (ref 3.5–5.1)
POTASSIUM SERPL-MCNC: 3.2 MMOL/L — LOW (ref 3.5–5)
POTASSIUM SERPL-MCNC: 3.2 MMOL/L — LOW (ref 3.5–5)
POTASSIUM SERPL-MCNC: 3.5 MMOL/L — SIGNIFICANT CHANGE UP (ref 3.5–5)
POTASSIUM SERPL-MCNC: 3.7 MMOL/L — SIGNIFICANT CHANGE UP (ref 3.5–5)
POTASSIUM SERPL-MCNC: 3.7 MMOL/L — SIGNIFICANT CHANGE UP (ref 3.5–5)
POTASSIUM SERPL-MCNC: 3.8 MMOL/L — SIGNIFICANT CHANGE UP (ref 3.5–5)
POTASSIUM SERPL-MCNC: 3.8 MMOL/L — SIGNIFICANT CHANGE UP (ref 3.5–5)
POTASSIUM SERPL-MCNC: 4 MMOL/L — SIGNIFICANT CHANGE UP (ref 3.5–5)
POTASSIUM SERPL-MCNC: 4.1 MMOL/L — SIGNIFICANT CHANGE UP (ref 3.5–5)
POTASSIUM SERPL-MCNC: 4.1 MMOL/L — SIGNIFICANT CHANGE UP (ref 3.5–5)
POTASSIUM SERPL-MCNC: 4.2 MMOL/L — SIGNIFICANT CHANGE UP (ref 3.5–5)
POTASSIUM SERPL-MCNC: 4.3 MMOL/L — SIGNIFICANT CHANGE UP (ref 3.5–5)
POTASSIUM SERPL-MCNC: 4.5 MMOL/L — SIGNIFICANT CHANGE UP (ref 3.5–5)
POTASSIUM SERPL-MCNC: 4.6 MMOL/L — SIGNIFICANT CHANGE UP (ref 3.5–5)
POTASSIUM SERPL-MCNC: 4.9 MMOL/L — SIGNIFICANT CHANGE UP (ref 3.5–5)
POTASSIUM SERPL-MCNC: 5.1 MMOL/L — HIGH (ref 3.5–5)
POTASSIUM SERPL-MCNC: 5.1 MMOL/L — HIGH (ref 3.5–5)
POTASSIUM SERPL-SCNC: 3.2 MMOL/L — LOW (ref 3.5–5)
POTASSIUM SERPL-SCNC: 3.2 MMOL/L — LOW (ref 3.5–5)
POTASSIUM SERPL-SCNC: 3.5 MMOL/L — SIGNIFICANT CHANGE UP (ref 3.5–5)
POTASSIUM SERPL-SCNC: 3.7 MMOL/L — SIGNIFICANT CHANGE UP (ref 3.5–5)
POTASSIUM SERPL-SCNC: 3.7 MMOL/L — SIGNIFICANT CHANGE UP (ref 3.5–5)
POTASSIUM SERPL-SCNC: 3.8 MMOL/L — SIGNIFICANT CHANGE UP (ref 3.5–5)
POTASSIUM SERPL-SCNC: 3.8 MMOL/L — SIGNIFICANT CHANGE UP (ref 3.5–5)
POTASSIUM SERPL-SCNC: 4 MMOL/L — SIGNIFICANT CHANGE UP (ref 3.5–5)
POTASSIUM SERPL-SCNC: 4.1 MMOL/L — SIGNIFICANT CHANGE UP (ref 3.5–5)
POTASSIUM SERPL-SCNC: 4.1 MMOL/L — SIGNIFICANT CHANGE UP (ref 3.5–5)
POTASSIUM SERPL-SCNC: 4.2 MMOL/L — SIGNIFICANT CHANGE UP (ref 3.5–5)
POTASSIUM SERPL-SCNC: 4.3 MMOL/L — SIGNIFICANT CHANGE UP (ref 3.5–5)
POTASSIUM SERPL-SCNC: 4.5 MMOL/L — SIGNIFICANT CHANGE UP (ref 3.5–5)
POTASSIUM SERPL-SCNC: 4.6 MMOL/L — SIGNIFICANT CHANGE UP (ref 3.5–5)
POTASSIUM SERPL-SCNC: 4.9 MMOL/L — SIGNIFICANT CHANGE UP (ref 3.5–5)
POTASSIUM SERPL-SCNC: 5.1 MMOL/L — HIGH (ref 3.5–5)
POTASSIUM SERPL-SCNC: 5.1 MMOL/L — HIGH (ref 3.5–5)
PROCALCITONIN SERPL-MCNC: 0.45 NG/ML — HIGH (ref 0.02–0.1)
PROMYELOCYTES # FLD: 0.9 % — HIGH (ref 0–0)
PROT SERPL-MCNC: 4.9 G/DL — LOW (ref 6–8)
PROT SERPL-MCNC: 5.2 G/DL — LOW (ref 6–8)
PROT SERPL-MCNC: 5.5 G/DL — LOW (ref 6–8)
PROT SERPL-MCNC: 5.5 G/DL — LOW (ref 6–8)
PROT SERPL-MCNC: 5.6 G/DL — LOW (ref 6–8)
PROT SERPL-MCNC: 5.7 G/DL — LOW (ref 6–8)
PROT SERPL-MCNC: 5.7 G/DL — LOW (ref 6–8)
PROT SERPL-MCNC: 5.8 G/DL — LOW (ref 6–8)
PROT SERPL-MCNC: 5.8 G/DL — LOW (ref 6–8)
PROT SERPL-MCNC: 5.9 G/DL — LOW (ref 6–8)
PROT SERPL-MCNC: 5.9 G/DL — LOW (ref 6–8)
PROT SERPL-MCNC: 6.4 G/DL — SIGNIFICANT CHANGE UP (ref 6–8)
PROT SERPL-MCNC: 6.5 G/DL — SIGNIFICANT CHANGE UP (ref 6–8)
PROT SERPL-MCNC: 6.7 G/DL — SIGNIFICANT CHANGE UP (ref 6–8)
PROT SERPL-MCNC: 7 G/DL — SIGNIFICANT CHANGE UP (ref 6–8)
PROT UR-MCNC: 30 MG/DL
PROT UR-MCNC: ABNORMAL
PROTHROM AB SERPL-ACNC: 13 SEC — HIGH (ref 9.95–12.87)
PROTHROM AB SERPL-ACNC: 16.1 SEC — HIGH (ref 9.95–12.87)
PROTHROM AB SERPL-ACNC: 21.3 SEC — HIGH (ref 9.95–12.87)
PTH RELATED PROT SERPL-MCNC: 11 PMOL/L — HIGH
PTH-INTACT FLD-MCNC: 18 PG/ML — SIGNIFICANT CHANGE UP (ref 15–65)
RBC # BLD: 1.95 M/UL — LOW (ref 4.2–5.4)
RBC # BLD: 3.29 M/UL — LOW (ref 4.2–5.4)
RBC # BLD: 3.3 M/UL — LOW (ref 4.2–5.4)
RBC # BLD: 3.33 M/UL — LOW (ref 4.2–5.4)
RBC # BLD: 3.36 M/UL — LOW (ref 4.2–5.4)
RBC # BLD: 3.43 M/UL — LOW (ref 4.2–5.4)
RBC # BLD: 3.45 M/UL — LOW (ref 4.2–5.4)
RBC # BLD: 3.45 M/UL — LOW (ref 4.2–5.4)
RBC # BLD: 3.56 M/UL — LOW (ref 4.2–5.4)
RBC # BLD: 3.57 M/UL — LOW (ref 4.2–5.4)
RBC # BLD: 3.58 M/UL — LOW (ref 4.2–5.4)
RBC # BLD: 3.6 M/UL — LOW (ref 4.2–5.4)
RBC # BLD: 3.72 M/UL — LOW (ref 4.2–5.4)
RBC # BLD: 3.72 M/UL — LOW (ref 4.2–5.4)
RBC # BLD: 3.82 M/UL — LOW (ref 4.2–5.4)
RBC # BLD: 3.82 M/UL — LOW (ref 4.2–5.4)
RBC # BLD: 3.88 M/UL — LOW (ref 4.2–5.4)
RBC # BLD: 3.9 M/UL — LOW (ref 4.2–5.4)
RBC # BLD: 3.97 M/UL — LOW (ref 4.2–5.4)
RBC # FLD: 20 % — HIGH (ref 11.5–14.5)
RBC # FLD: 20.3 % — HIGH (ref 11.5–14.5)
RBC # FLD: 20.8 % — HIGH (ref 11.5–14.5)
RBC # FLD: 21.1 % — HIGH (ref 11.5–14.5)
RBC # FLD: 21.2 % — HIGH (ref 11.5–14.5)
RBC # FLD: 21.4 % — HIGH (ref 11.5–14.5)
RBC # FLD: 21.5 % — HIGH (ref 11.5–14.5)
RBC # FLD: 21.7 % — HIGH (ref 11.5–14.5)
RBC # FLD: 21.7 % — HIGH (ref 11.5–14.5)
RBC # FLD: 22.1 % — HIGH (ref 11.5–14.5)
RBC # FLD: 22.2 % — HIGH (ref 11.5–14.5)
RBC # FLD: 22.4 % — HIGH (ref 11.5–14.5)
RBC # FLD: 22.9 % — HIGH (ref 11.5–14.5)
RBC # FLD: 23.8 % — HIGH (ref 11.5–14.5)
RBC # FLD: 25.2 % — HIGH (ref 11.5–14.5)
RBC BLD AUTO: ABNORMAL
RBC BLD AUTO: ABNORMAL
RBC BLD AUTO: NORMAL — SIGNIFICANT CHANGE UP
RBC CASTS # UR COMP ASSIST: 21 /HPF — HIGH (ref 0–4)
RBC CASTS # UR COMP ASSIST: >50 /HPF
RETICS #: 120.7 K/UL — SIGNIFICANT CHANGE UP (ref 25–125)
RETICS #: 93 K/UL — SIGNIFICANT CHANGE UP (ref 25–125)
RETICS/RBC NFR: 2.7 % — HIGH (ref 0.5–1.5)
RETICS/RBC NFR: 3.2 % — HIGH (ref 0.5–1.5)
SAO2 % BLDA: 100 % — HIGH (ref 94–98)
SAO2 % BLDV: 43.5 % — SIGNIFICANT CHANGE UP
SARS-COV-2 RNA SPEC QL NAA+PROBE: DETECTED
SARS-COV-2 RNA SPEC QL NAA+PROBE: SIGNIFICANT CHANGE UP
SCHISTOCYTES BLD QL AUTO: SLIGHT — SIGNIFICANT CHANGE UP
SODIUM SERPL-SCNC: 132 MMOL/L — LOW (ref 135–146)
SODIUM SERPL-SCNC: 133 MMOL/L — LOW (ref 135–146)
SODIUM SERPL-SCNC: 134 MMOL/L — LOW (ref 135–146)
SODIUM SERPL-SCNC: 134 MMOL/L — LOW (ref 135–146)
SODIUM SERPL-SCNC: 135 MMOL/L — SIGNIFICANT CHANGE UP (ref 135–146)
SODIUM SERPL-SCNC: 136 MMOL/L — SIGNIFICANT CHANGE UP (ref 135–146)
SODIUM SERPL-SCNC: 137 MMOL/L — SIGNIFICANT CHANGE UP (ref 135–146)
SODIUM SERPL-SCNC: 138 MMOL/L — SIGNIFICANT CHANGE UP (ref 135–146)
SODIUM SERPL-SCNC: 139 MMOL/L — SIGNIFICANT CHANGE UP (ref 135–146)
SP GR SPEC: 1.01 — SIGNIFICANT CHANGE UP (ref 1.01–1.03)
SP GR SPEC: 1.02 — SIGNIFICANT CHANGE UP (ref 1.01–1.03)
SPECIMEN SOURCE: SIGNIFICANT CHANGE UP
SPHEROCYTES BLD QL SMEAR: SIGNIFICANT CHANGE UP
T3FREE SERPL-MCNC: 1.57 PG/ML — LOW (ref 1.8–4.6)
T4 FREE SERPL-MCNC: 1.4 NG/DL — SIGNIFICANT CHANGE UP (ref 0.9–1.8)
TARGETS BLD QL SMEAR: SIGNIFICANT CHANGE UP
TARGETS BLD QL SMEAR: SIGNIFICANT CHANGE UP
TARGETS BLD QL SMEAR: SLIGHT — SIGNIFICANT CHANGE UP
TIBC SERPL-MCNC: 288 UG/DL — SIGNIFICANT CHANGE UP (ref 220–430)
TIBC SERPL-MCNC: 314 UG/DL — SIGNIFICANT CHANGE UP (ref 220–430)
TM INTERPRETATION: SIGNIFICANT CHANGE UP
TRANSFERRIN SERPL-MCNC: 245 MG/DL — SIGNIFICANT CHANGE UP (ref 200–360)
TRANSFERRIN SERPL-MCNC: 246 MG/DL — SIGNIFICANT CHANGE UP (ref 200–360)
TROPONIN T SERPL-MCNC: <0.01 NG/ML — SIGNIFICANT CHANGE UP
TROPONIN T SERPL-MCNC: <0.01 NG/ML — SIGNIFICANT CHANGE UP
TSH SERPL-MCNC: 8.61 UIU/ML — HIGH (ref 0.27–4.2)
UIBC SERPL-MCNC: 261 UG/DL — SIGNIFICANT CHANGE UP (ref 110–370)
UIBC SERPL-MCNC: 283 UG/DL — SIGNIFICANT CHANGE UP (ref 110–370)
URATE SERPL-MCNC: 11.5 MG/DL — HIGH (ref 2.5–7)
URATE SERPL-MCNC: 4.8 MG/DL — SIGNIFICANT CHANGE UP (ref 2.5–7)
URATE SERPL-MCNC: 4.9 MG/DL — SIGNIFICANT CHANGE UP (ref 2.5–7)
URATE SERPL-MCNC: 6.7 MG/DL — SIGNIFICANT CHANGE UP (ref 2.5–7)
UROBILINOGEN FLD QL: 0.2 MG/DL — SIGNIFICANT CHANGE UP
UROBILINOGEN FLD QL: SIGNIFICANT CHANGE UP
VARIANT LYMPHS # BLD: 1 % — SIGNIFICANT CHANGE UP (ref 0–5)
VIT B12 SERPL-MCNC: >2000 PG/ML — HIGH (ref 232–1245)
WBC # BLD: 1.62 K/UL — LOW (ref 4.8–10.8)
WBC # BLD: 23.63 K/UL — HIGH (ref 4.8–10.8)
WBC # BLD: 24.1 K/UL — HIGH (ref 4.8–10.8)
WBC # BLD: 25.3 K/UL — HIGH (ref 4.8–10.8)
WBC # BLD: 26.77 K/UL — HIGH (ref 4.8–10.8)
WBC # BLD: 28.2 K/UL — HIGH (ref 4.8–10.8)
WBC # BLD: 28.77 K/UL — HIGH (ref 4.8–10.8)
WBC # BLD: 30.93 K/UL — HIGH (ref 4.8–10.8)
WBC # BLD: 34.36 K/UL — HIGH (ref 4.8–10.8)
WBC # BLD: 39.23 K/UL — HIGH (ref 4.8–10.8)
WBC # BLD: 39.35 K/UL — HIGH (ref 4.8–10.8)
WBC # BLD: 49 K/UL — CRITICAL HIGH (ref 4.8–10.8)
WBC # BLD: 51.95 K/UL — CRITICAL HIGH (ref 4.8–10.8)
WBC # BLD: 54.31 K/UL — CRITICAL HIGH (ref 4.8–10.8)
WBC # BLD: 57.25 K/UL — CRITICAL HIGH (ref 4.8–10.8)
WBC # BLD: 58.76 K/UL — CRITICAL HIGH (ref 4.8–10.8)
WBC # BLD: 59.83 K/UL — CRITICAL HIGH (ref 4.8–10.8)
WBC # BLD: 60.32 K/UL — CRITICAL HIGH (ref 4.8–10.8)
WBC # BLD: 8.49 K/UL — SIGNIFICANT CHANGE UP (ref 4.8–10.8)
WBC # FLD AUTO: 1.62 K/UL — LOW (ref 4.8–10.8)
WBC # FLD AUTO: 23.63 K/UL — HIGH (ref 4.8–10.8)
WBC # FLD AUTO: 24.1 K/UL — HIGH (ref 4.8–10.8)
WBC # FLD AUTO: 25.3 K/UL — HIGH (ref 4.8–10.8)
WBC # FLD AUTO: 26.77 K/UL — HIGH (ref 4.8–10.8)
WBC # FLD AUTO: 28.2 K/UL — HIGH (ref 4.8–10.8)
WBC # FLD AUTO: 28.77 K/UL — HIGH (ref 4.8–10.8)
WBC # FLD AUTO: 30.93 K/UL — HIGH (ref 4.8–10.8)
WBC # FLD AUTO: 34.36 K/UL — HIGH (ref 4.8–10.8)
WBC # FLD AUTO: 39.23 K/UL — HIGH (ref 4.8–10.8)
WBC # FLD AUTO: 39.35 K/UL — HIGH (ref 4.8–10.8)
WBC # FLD AUTO: 49 K/UL — CRITICAL HIGH (ref 4.8–10.8)
WBC # FLD AUTO: 51.95 K/UL — CRITICAL HIGH (ref 4.8–10.8)
WBC # FLD AUTO: 54.31 K/UL — CRITICAL HIGH (ref 4.8–10.8)
WBC # FLD AUTO: 57.25 K/UL — CRITICAL HIGH (ref 4.8–10.8)
WBC # FLD AUTO: 58.76 K/UL — CRITICAL HIGH (ref 4.8–10.8)
WBC # FLD AUTO: 59.83 K/UL — CRITICAL HIGH (ref 4.8–10.8)
WBC # FLD AUTO: 60.32 K/UL — CRITICAL HIGH (ref 4.8–10.8)
WBC # FLD AUTO: 8.49 K/UL — SIGNIFICANT CHANGE UP (ref 4.8–10.8)
WBC UR QL: 213 /HPF — HIGH (ref 0–5)
WBC UR QL: SIGNIFICANT CHANGE UP /HPF

## 2022-01-01 PROCEDURE — 99233 SBSQ HOSP IP/OBS HIGH 50: CPT

## 2022-01-01 PROCEDURE — 99239 HOSP IP/OBS DSCHRG MGMT >30: CPT

## 2022-01-01 PROCEDURE — 93010 ELECTROCARDIOGRAM REPORT: CPT

## 2022-01-01 PROCEDURE — 99222 1ST HOSP IP/OBS MODERATE 55: CPT | Mod: AI

## 2022-01-01 PROCEDURE — 71045 X-RAY EXAM CHEST 1 VIEW: CPT | Mod: 26

## 2022-01-01 PROCEDURE — 99285 EMERGENCY DEPT VISIT HI MDM: CPT | Mod: FS

## 2022-01-01 PROCEDURE — 72100 X-RAY EXAM L-S SPINE 2/3 VWS: CPT | Mod: 26

## 2022-01-01 PROCEDURE — 76830 TRANSVAGINAL US NON-OB: CPT | Mod: 26

## 2022-01-01 PROCEDURE — 99221 1ST HOSP IP/OBS SF/LOW 40: CPT

## 2022-01-01 PROCEDURE — 99232 SBSQ HOSP IP/OBS MODERATE 35: CPT

## 2022-01-01 PROCEDURE — 71045 X-RAY EXAM CHEST 1 VIEW: CPT | Mod: 26,77

## 2022-01-01 PROCEDURE — 99291 CRITICAL CARE FIRST HOUR: CPT

## 2022-01-01 PROCEDURE — 74176 CT ABD & PELVIS W/O CONTRAST: CPT | Mod: 26

## 2022-01-01 PROCEDURE — 76705 ECHO EXAM OF ABDOMEN: CPT | Mod: 26

## 2022-01-01 PROCEDURE — 88333 PATH CONSLTJ SURG CYTO XM 1: CPT | Mod: 26

## 2022-01-01 PROCEDURE — 99223 1ST HOSP IP/OBS HIGH 75: CPT

## 2022-01-01 PROCEDURE — G1004: CPT

## 2022-01-01 PROCEDURE — 74018 RADEX ABDOMEN 1 VIEW: CPT | Mod: 26

## 2022-01-01 PROCEDURE — 88341 IMHCHEM/IMCYTCHM EA ADD ANTB: CPT | Mod: 26

## 2022-01-01 PROCEDURE — 76770 US EXAM ABDO BACK WALL COMP: CPT | Mod: 26

## 2022-01-01 PROCEDURE — 74177 CT ABD & PELVIS W/CONTRAST: CPT | Mod: 26,MG

## 2022-01-01 PROCEDURE — 99285 EMERGENCY DEPT VISIT HI MDM: CPT | Mod: FS,CS

## 2022-01-01 PROCEDURE — 88305 TISSUE EXAM BY PATHOLOGIST: CPT | Mod: 26

## 2022-01-01 PROCEDURE — 36569 INSJ PICC 5 YR+ W/O IMAGING: CPT

## 2022-01-01 PROCEDURE — 99356: CPT

## 2022-01-01 PROCEDURE — 31500 INSERT EMERGENCY AIRWAY: CPT

## 2022-01-01 PROCEDURE — 71260 CT THORAX DX C+: CPT | Mod: 26

## 2022-01-01 PROCEDURE — 88189 FLOWCYTOMETRY/READ 16 & >: CPT

## 2022-01-01 PROCEDURE — 88342 IMHCHEM/IMCYTCHM 1ST ANTB: CPT | Mod: 26

## 2022-01-01 PROCEDURE — 93970 EXTREMITY STUDY: CPT | Mod: 26

## 2022-01-01 PROCEDURE — 76937 US GUIDE VASCULAR ACCESS: CPT | Mod: 26,59

## 2022-01-01 PROCEDURE — 70450 CT HEAD/BRAIN W/O DYE: CPT | Mod: 26

## 2022-01-01 RX ORDER — FENTANYL CITRATE 50 UG/ML
0.5 INJECTION INTRAVENOUS
Qty: 2500 | Refills: 0 | Status: DISCONTINUED | OUTPATIENT
Start: 2022-01-01 | End: 2022-01-01

## 2022-01-01 RX ORDER — LOPERAMIDE HCL 2 MG
2 TABLET ORAL EVERY 4 HOURS
Refills: 0 | Status: DISCONTINUED | OUTPATIENT
Start: 2022-01-01 | End: 2022-01-01

## 2022-01-01 RX ORDER — HEPARIN SODIUM 5000 [USP'U]/ML
5000 INJECTION INTRAVENOUS; SUBCUTANEOUS THREE TIMES A DAY
Refills: 0 | Status: DISCONTINUED | OUTPATIENT
Start: 2022-01-01 | End: 2022-01-01

## 2022-01-01 RX ORDER — ASPIRIN/CALCIUM CARB/MAGNESIUM 324 MG
81 TABLET ORAL DAILY
Refills: 0 | Status: DISCONTINUED | OUTPATIENT
Start: 2022-01-01 | End: 2022-01-01

## 2022-01-01 RX ORDER — DIPHENHYDRAMINE HCL 50 MG
50 CAPSULE ORAL ONCE
Refills: 0 | Status: DISCONTINUED | OUTPATIENT
Start: 2022-01-01 | End: 2022-01-01

## 2022-01-01 RX ORDER — NIRMATRELVIR AND RITONAVIR 150-100 MG
3 KIT ORAL
Qty: 1 | Refills: 0
Start: 2022-01-01 | End: 2022-01-01

## 2022-01-01 RX ORDER — NOREPINEPHRINE BITARTRATE/D5W 8 MG/250ML
0.05 PLASTIC BAG, INJECTION (ML) INTRAVENOUS
Qty: 8 | Refills: 0 | Status: DISCONTINUED | OUTPATIENT
Start: 2022-01-01 | End: 2022-01-01

## 2022-01-01 RX ORDER — DULOXETINE HYDROCHLORIDE 30 MG/1
1 CAPSULE, DELAYED RELEASE ORAL
Qty: 0 | Refills: 0 | DISCHARGE

## 2022-01-01 RX ORDER — NIFEDIPINE 30 MG
30 TABLET, EXTENDED RELEASE 24 HR ORAL DAILY
Refills: 0 | Status: DISCONTINUED | OUTPATIENT
Start: 2022-01-01 | End: 2022-01-01

## 2022-01-01 RX ORDER — FERROUS SULFATE 325(65) MG
1 TABLET ORAL
Qty: 14 | Refills: 0
Start: 2022-01-01 | End: 2022-01-01

## 2022-01-01 RX ORDER — LEVOTHYROXINE SODIUM 125 MCG
150 TABLET ORAL DAILY
Refills: 0 | Status: DISCONTINUED | OUTPATIENT
Start: 2022-01-01 | End: 2022-01-01

## 2022-01-01 RX ORDER — HYDROXYCHLOROQUINE SULFATE 200 MG
200 TABLET ORAL
Refills: 0 | Status: DISCONTINUED | OUTPATIENT
Start: 2022-01-01 | End: 2022-01-01

## 2022-01-01 RX ORDER — DEXTROSE 50 % IN WATER 50 %
25 SYRINGE (ML) INTRAVENOUS ONCE
Refills: 0 | Status: DISCONTINUED | OUTPATIENT
Start: 2022-01-01 | End: 2022-01-01

## 2022-01-01 RX ORDER — CIPROFLOXACIN LACTATE 400MG/40ML
400 VIAL (ML) INTRAVENOUS ONCE
Refills: 0 | Status: COMPLETED | OUTPATIENT
Start: 2022-01-01 | End: 2022-01-01

## 2022-01-01 RX ORDER — OXYCODONE HYDROCHLORIDE 5 MG/1
5 TABLET ORAL EVERY 4 HOURS
Refills: 0 | Status: DISCONTINUED | OUTPATIENT
Start: 2022-01-01 | End: 2022-01-01

## 2022-01-01 RX ORDER — FENTANYL CITRATE 50 UG/ML
50 INJECTION INTRAVENOUS ONCE
Refills: 0 | Status: DISCONTINUED | OUTPATIENT
Start: 2022-01-01 | End: 2022-01-01

## 2022-01-01 RX ORDER — ENOXAPARIN SODIUM 100 MG/ML
40 INJECTION SUBCUTANEOUS EVERY 24 HOURS
Refills: 0 | Status: DISCONTINUED | OUTPATIENT
Start: 2022-01-01 | End: 2022-01-01

## 2022-01-01 RX ORDER — LEVOTHYROXINE SODIUM 125 MCG
1 TABLET ORAL
Qty: 0 | Refills: 0 | DISCHARGE

## 2022-01-01 RX ORDER — SODIUM CHLORIDE 9 MG/ML
250 INJECTION INTRAMUSCULAR; INTRAVENOUS; SUBCUTANEOUS ONCE
Refills: 0 | Status: DISCONTINUED | OUTPATIENT
Start: 2022-01-01 | End: 2022-01-01

## 2022-01-01 RX ORDER — HEPARIN SODIUM 5000 [USP'U]/ML
5000 INJECTION INTRAVENOUS; SUBCUTANEOUS EVERY 8 HOURS
Refills: 0 | Status: DISCONTINUED | OUTPATIENT
Start: 2022-01-01 | End: 2022-01-01

## 2022-01-01 RX ORDER — METRONIDAZOLE 500 MG
500 TABLET ORAL EVERY 8 HOURS
Refills: 0 | Status: DISCONTINUED | OUTPATIENT
Start: 2022-01-01 | End: 2022-01-01

## 2022-01-01 RX ORDER — LOPERAMIDE HCL 2 MG
2 TABLET ORAL ONCE
Refills: 0 | Status: COMPLETED | OUTPATIENT
Start: 2022-01-01 | End: 2022-01-01

## 2022-01-01 RX ORDER — MAGNESIUM OXIDE 400 MG ORAL TABLET 241.3 MG
400 TABLET ORAL ONCE
Refills: 0 | Status: COMPLETED | OUTPATIENT
Start: 2022-01-01 | End: 2022-01-01

## 2022-01-01 RX ORDER — METRONIDAZOLE 500 MG
500 TABLET ORAL ONCE
Refills: 0 | Status: COMPLETED | OUTPATIENT
Start: 2022-01-01 | End: 2022-01-01

## 2022-01-01 RX ORDER — SODIUM CHLORIDE 9 MG/ML
1000 INJECTION INTRAMUSCULAR; INTRAVENOUS; SUBCUTANEOUS ONCE
Refills: 0 | Status: COMPLETED | OUTPATIENT
Start: 2022-01-01 | End: 2022-01-01

## 2022-01-01 RX ORDER — CHLORHEXIDINE GLUCONATE 213 G/1000ML
1 SOLUTION TOPICAL
Refills: 0 | Status: DISCONTINUED | OUTPATIENT
Start: 2022-01-01 | End: 2022-01-01

## 2022-01-01 RX ORDER — HYDROMORPHONE HYDROCHLORIDE 2 MG/ML
2 INJECTION INTRAMUSCULAR; INTRAVENOUS; SUBCUTANEOUS EVERY 6 HOURS
Refills: 0 | Status: DISCONTINUED | OUTPATIENT
Start: 2022-01-01 | End: 2022-01-01

## 2022-01-01 RX ORDER — SODIUM CHLORIDE 9 MG/ML
1000 INJECTION INTRAMUSCULAR; INTRAVENOUS; SUBCUTANEOUS
Refills: 0 | Status: DISCONTINUED | OUTPATIENT
Start: 2022-01-01 | End: 2022-01-01

## 2022-01-01 RX ORDER — POTASSIUM CHLORIDE 20 MEQ
20 PACKET (EA) ORAL ONCE
Refills: 0 | Status: COMPLETED | OUTPATIENT
Start: 2022-01-01 | End: 2022-01-01

## 2022-01-01 RX ORDER — GLUCAGON INJECTION, SOLUTION 0.5 MG/.1ML
1 INJECTION, SOLUTION SUBCUTANEOUS ONCE
Refills: 0 | Status: DISCONTINUED | OUTPATIENT
Start: 2022-01-01 | End: 2022-01-01

## 2022-01-01 RX ORDER — MORPHINE SULFATE 50 MG/1
2 CAPSULE, EXTENDED RELEASE ORAL EVERY 4 HOURS
Refills: 0 | Status: DISCONTINUED | OUTPATIENT
Start: 2022-01-01 | End: 2022-01-01

## 2022-01-01 RX ORDER — HYDROXYCHLOROQUINE SULFATE 200 MG
1 TABLET ORAL
Qty: 0 | Refills: 0 | DISCHARGE

## 2022-01-01 RX ORDER — IRON SUCROSE 20 MG/ML
200 INJECTION, SOLUTION INTRAVENOUS EVERY 24 HOURS
Refills: 0 | Status: DISCONTINUED | OUTPATIENT
Start: 2022-01-01 | End: 2022-01-01

## 2022-01-01 RX ORDER — NYSTATIN CREAM 100000 [USP'U]/G
1 CREAM TOPICAL
Refills: 0 | Status: DISCONTINUED | OUTPATIENT
Start: 2022-01-01 | End: 2022-01-01

## 2022-01-01 RX ORDER — FUROSEMIDE 40 MG
40 TABLET ORAL ONCE
Refills: 0 | Status: COMPLETED | OUTPATIENT
Start: 2022-01-01 | End: 2022-01-01

## 2022-01-01 RX ORDER — FENTANYL CITRATE 50 UG/ML
0.5 INJECTION INTRAVENOUS
Qty: 5000 | Refills: 0 | Status: DISCONTINUED | OUTPATIENT
Start: 2022-01-01 | End: 2022-01-01

## 2022-01-01 RX ORDER — NIFEDIPINE 30 MG
30 TABLET, EXTENDED RELEASE 24 HR ORAL ONCE
Refills: 0 | Status: COMPLETED | OUTPATIENT
Start: 2022-01-01 | End: 2022-01-01

## 2022-01-01 RX ORDER — VASOPRESSIN 20 [USP'U]/ML
0.04 INJECTION INTRAVENOUS
Qty: 50 | Refills: 0 | Status: DISCONTINUED | OUTPATIENT
Start: 2022-01-01 | End: 2022-01-01

## 2022-01-01 RX ORDER — FOSAPREPITANT DIMEGLUMINE 150 MG/5ML
150 INJECTION, POWDER, LYOPHILIZED, FOR SOLUTION INTRAVENOUS ONCE
Refills: 0 | Status: COMPLETED | OUTPATIENT
Start: 2022-01-01 | End: 2022-01-01

## 2022-01-01 RX ORDER — DULOXETINE HYDROCHLORIDE 30 MG/1
60 CAPSULE, DELAYED RELEASE ORAL DAILY
Refills: 0 | Status: DISCONTINUED | OUTPATIENT
Start: 2022-01-01 | End: 2022-01-01

## 2022-01-01 RX ORDER — SODIUM CHLORIDE 9 MG/ML
1000 INJECTION, SOLUTION INTRAVENOUS ONCE
Refills: 0 | Status: COMPLETED | OUTPATIENT
Start: 2022-01-01 | End: 2022-01-01

## 2022-01-01 RX ORDER — POTASSIUM CHLORIDE 20 MEQ
40 PACKET (EA) ORAL ONCE
Refills: 0 | Status: COMPLETED | OUTPATIENT
Start: 2022-01-01 | End: 2022-01-01

## 2022-01-01 RX ORDER — SODIUM,POTASSIUM PHOSPHATES 278-250MG
1 POWDER IN PACKET (EA) ORAL ONCE
Refills: 0 | Status: COMPLETED | OUTPATIENT
Start: 2022-01-01 | End: 2022-01-01

## 2022-01-01 RX ORDER — TRAMADOL HYDROCHLORIDE 50 MG/1
25 TABLET ORAL
Refills: 0 | Status: DISCONTINUED | OUTPATIENT
Start: 2022-01-01 | End: 2022-01-01

## 2022-01-01 RX ORDER — FAMOTIDINE 10 MG/ML
20 INJECTION INTRAVENOUS ONCE
Refills: 0 | Status: COMPLETED | OUTPATIENT
Start: 2022-01-01 | End: 2022-01-01

## 2022-01-01 RX ORDER — PANTOPRAZOLE SODIUM 20 MG/1
40 TABLET, DELAYED RELEASE ORAL DAILY
Refills: 0 | Status: DISCONTINUED | OUTPATIENT
Start: 2022-01-01 | End: 2022-01-01

## 2022-01-01 RX ORDER — METOCLOPRAMIDE HCL 10 MG
10 TABLET ORAL ONCE
Refills: 0 | Status: DISCONTINUED | OUTPATIENT
Start: 2022-01-01 | End: 2022-01-01

## 2022-01-01 RX ORDER — LISINOPRIL 2.5 MG/1
40 TABLET ORAL DAILY
Refills: 0 | Status: DISCONTINUED | OUTPATIENT
Start: 2022-01-01 | End: 2022-01-01

## 2022-01-01 RX ORDER — MORPHINE SULFATE 50 MG/1
4 CAPSULE, EXTENDED RELEASE ORAL ONCE
Refills: 0 | Status: DISCONTINUED | OUTPATIENT
Start: 2022-01-01 | End: 2022-01-01

## 2022-01-01 RX ORDER — ASPIRIN/CALCIUM CARB/MAGNESIUM 324 MG
1 TABLET ORAL
Qty: 0 | Refills: 0 | DISCHARGE
Start: 2022-01-01

## 2022-01-01 RX ORDER — NOREPINEPHRINE BITARTRATE/D5W 8 MG/250ML
0.05 PLASTIC BAG, INJECTION (ML) INTRAVENOUS
Qty: 32 | Refills: 0 | Status: DISCONTINUED | OUTPATIENT
Start: 2022-01-01 | End: 2022-01-01

## 2022-01-01 RX ORDER — EPINEPHRINE 0.3 MG/.3ML
0.5 INJECTION INTRAMUSCULAR; SUBCUTANEOUS
Qty: 8 | Refills: 0 | Status: DISCONTINUED | OUTPATIENT
Start: 2022-01-01 | End: 2022-01-01

## 2022-01-01 RX ORDER — MIDAZOLAM HYDROCHLORIDE 1 MG/ML
0.02 INJECTION, SOLUTION INTRAMUSCULAR; INTRAVENOUS
Qty: 100 | Refills: 0 | Status: DISCONTINUED | OUTPATIENT
Start: 2022-01-01 | End: 2022-01-01

## 2022-01-01 RX ORDER — DEXTROSE 50 % IN WATER 50 %
12.5 SYRINGE (ML) INTRAVENOUS ONCE
Refills: 0 | Status: DISCONTINUED | OUTPATIENT
Start: 2022-01-01 | End: 2022-01-01

## 2022-01-01 RX ORDER — SODIUM,POTASSIUM PHOSPHATES 278-250MG
1 POWDER IN PACKET (EA) ORAL
Refills: 0 | Status: DISCONTINUED | OUTPATIENT
Start: 2022-01-01 | End: 2022-01-01

## 2022-01-01 RX ORDER — LOPERAMIDE HCL 2 MG
2 TABLET ORAL EVERY 6 HOURS
Refills: 0 | Status: DISCONTINUED | OUTPATIENT
Start: 2022-01-01 | End: 2022-01-01

## 2022-01-01 RX ORDER — FERROUS SULFATE 325(65) MG
325 TABLET ORAL DAILY
Refills: 0 | Status: DISCONTINUED | OUTPATIENT
Start: 2022-01-01 | End: 2022-01-01

## 2022-01-01 RX ORDER — NIFEDIPINE 30 MG
30 TABLET, EXTENDED RELEASE 24 HR ORAL ONCE
Refills: 0 | Status: DISCONTINUED | OUTPATIENT
Start: 2022-01-01 | End: 2022-01-01

## 2022-01-01 RX ORDER — PHENYLEPHRINE HYDROCHLORIDE 10 MG/ML
0.5 INJECTION INTRAVENOUS
Qty: 160 | Refills: 0 | Status: DISCONTINUED | OUTPATIENT
Start: 2022-01-01 | End: 2022-01-01

## 2022-01-01 RX ORDER — ONDANSETRON 8 MG/1
4 TABLET, FILM COATED ORAL EVERY 6 HOURS
Refills: 0 | Status: DISCONTINUED | OUTPATIENT
Start: 2022-01-01 | End: 2022-01-01

## 2022-01-01 RX ORDER — MAGNESIUM SULFATE 500 MG/ML
2 VIAL (ML) INJECTION ONCE
Refills: 0 | Status: COMPLETED | OUTPATIENT
Start: 2022-01-01 | End: 2022-01-01

## 2022-01-01 RX ORDER — OXYCODONE AND ACETAMINOPHEN 5; 325 MG/1; MG/1
2 TABLET ORAL EVERY 4 HOURS
Refills: 0 | Status: DISCONTINUED | OUTPATIENT
Start: 2022-01-01 | End: 2022-01-01

## 2022-01-01 RX ORDER — CIPROFLOXACIN LACTATE 400MG/40ML
400 VIAL (ML) INTRAVENOUS EVERY 12 HOURS
Refills: 0 | Status: DISCONTINUED | OUTPATIENT
Start: 2022-01-01 | End: 2022-01-01

## 2022-01-01 RX ORDER — ONDANSETRON 8 MG/1
4 TABLET, FILM COATED ORAL EVERY 8 HOURS
Refills: 0 | Status: DISCONTINUED | OUTPATIENT
Start: 2022-01-01 | End: 2022-01-01

## 2022-01-01 RX ORDER — HYDROCORTISONE 20 MG
100 TABLET ORAL EVERY 8 HOURS
Refills: 0 | Status: DISCONTINUED | OUTPATIENT
Start: 2022-01-01 | End: 2022-01-01

## 2022-01-01 RX ORDER — NIFEDIPINE 30 MG
60 TABLET, EXTENDED RELEASE 24 HR ORAL DAILY
Refills: 0 | Status: DISCONTINUED | OUTPATIENT
Start: 2022-01-01 | End: 2022-01-01

## 2022-01-01 RX ORDER — CHLORHEXIDINE GLUCONATE 213 G/1000ML
15 SOLUTION TOPICAL EVERY 12 HOURS
Refills: 0 | Status: DISCONTINUED | OUTPATIENT
Start: 2022-01-01 | End: 2022-01-01

## 2022-01-01 RX ORDER — MORPHINE SULFATE 50 MG/1
2 CAPSULE, EXTENDED RELEASE ORAL ONCE
Refills: 0 | Status: DISCONTINUED | OUTPATIENT
Start: 2022-01-01 | End: 2022-01-01

## 2022-01-01 RX ORDER — FUROSEMIDE 40 MG
20 TABLET ORAL DAILY
Refills: 0 | Status: DISCONTINUED | OUTPATIENT
Start: 2022-01-01 | End: 2022-01-01

## 2022-01-01 RX ORDER — SODIUM BICARBONATE 1 MEQ/ML
150 SYRINGE (ML) INTRAVENOUS ONCE
Refills: 0 | Status: COMPLETED | OUTPATIENT
Start: 2022-01-01 | End: 2022-01-01

## 2022-01-01 RX ORDER — ZOLPIDEM TARTRATE 10 MG/1
5 TABLET ORAL ONCE
Refills: 0 | Status: DISCONTINUED | OUTPATIENT
Start: 2022-01-01 | End: 2022-01-01

## 2022-01-01 RX ORDER — ONDANSETRON 8 MG/1
8 TABLET, FILM COATED ORAL EVERY 8 HOURS
Refills: 0 | Status: DISCONTINUED | OUTPATIENT
Start: 2022-01-01 | End: 2022-01-01

## 2022-01-01 RX ORDER — FAMOTIDINE 10 MG/ML
1 INJECTION INTRAVENOUS
Qty: 30 | Refills: 0
Start: 2022-01-01 | End: 2022-08-20

## 2022-01-01 RX ORDER — DEXAMETHASONE 0.5 MG/5ML
12 ELIXIR ORAL ONCE
Refills: 0 | Status: COMPLETED | OUTPATIENT
Start: 2022-01-01 | End: 2022-01-01

## 2022-01-01 RX ORDER — METOCLOPRAMIDE HCL 10 MG
10 TABLET ORAL ONCE
Refills: 0 | Status: COMPLETED | OUTPATIENT
Start: 2022-01-01 | End: 2022-01-01

## 2022-01-01 RX ORDER — OXYCODONE HYDROCHLORIDE 5 MG/1
7.5 TABLET ORAL EVERY 4 HOURS
Refills: 0 | Status: DISCONTINUED | OUTPATIENT
Start: 2022-01-01 | End: 2022-01-01

## 2022-01-01 RX ORDER — ONDANSETRON 8 MG/1
4 TABLET, FILM COATED ORAL ONCE
Refills: 0 | Status: COMPLETED | OUTPATIENT
Start: 2022-01-01 | End: 2022-01-01

## 2022-01-01 RX ORDER — ALLOPURINOL 300 MG
300 TABLET ORAL DAILY
Refills: 0 | Status: DISCONTINUED | OUTPATIENT
Start: 2022-01-01 | End: 2022-01-01

## 2022-01-01 RX ORDER — SODIUM CHLORIDE 9 MG/ML
1000 INJECTION, SOLUTION INTRAVENOUS
Refills: 0 | Status: DISCONTINUED | OUTPATIENT
Start: 2022-01-01 | End: 2022-01-01

## 2022-01-01 RX ORDER — POTASSIUM CHLORIDE 20 MEQ
20 PACKET (EA) ORAL
Refills: 0 | Status: COMPLETED | OUTPATIENT
Start: 2022-01-01 | End: 2022-01-01

## 2022-01-01 RX ORDER — SODIUM BICARBONATE 1 MEQ/ML
650 SYRINGE (ML) INTRAVENOUS EVERY 8 HOURS
Refills: 0 | Status: DISCONTINUED | OUTPATIENT
Start: 2022-01-01 | End: 2022-01-01

## 2022-01-01 RX ORDER — DIATRIZOATE MEGLUMINE 180 MG/ML
30 INJECTION, SOLUTION INTRAVESICAL ONCE
Refills: 0 | Status: COMPLETED | OUTPATIENT
Start: 2022-01-01 | End: 2022-01-01

## 2022-01-01 RX ORDER — POTASSIUM CHLORIDE 20 MEQ
40 PACKET (EA) ORAL ONCE
Refills: 0 | Status: DISCONTINUED | OUTPATIENT
Start: 2022-01-01 | End: 2022-01-01

## 2022-01-01 RX ORDER — LANOLIN ALCOHOL/MO/W.PET/CERES
5 CREAM (GRAM) TOPICAL AT BEDTIME
Refills: 0 | Status: DISCONTINUED | OUTPATIENT
Start: 2022-01-01 | End: 2022-01-01

## 2022-01-01 RX ORDER — ONDANSETRON 8 MG/1
16 TABLET, FILM COATED ORAL ONCE
Refills: 0 | Status: DISCONTINUED | OUTPATIENT
Start: 2022-01-01 | End: 2022-01-01

## 2022-01-01 RX ORDER — DEXTROSE 50 % IN WATER 50 %
15 SYRINGE (ML) INTRAVENOUS ONCE
Refills: 0 | Status: DISCONTINUED | OUTPATIENT
Start: 2022-01-01 | End: 2022-01-01

## 2022-01-01 RX ORDER — SODIUM BICARBONATE 1 MEQ/ML
650 SYRINGE (ML) INTRAVENOUS EVERY 12 HOURS
Refills: 0 | Status: DISCONTINUED | OUTPATIENT
Start: 2022-01-01 | End: 2022-01-01

## 2022-01-01 RX ORDER — HEPARIN SODIUM 5000 [USP'U]/ML
5000 INJECTION INTRAVENOUS; SUBCUTANEOUS EVERY 8 HOURS
Refills: 0 | Status: COMPLETED | OUTPATIENT
Start: 2022-01-01 | End: 2022-01-01

## 2022-01-01 RX ORDER — CARBOPLATIN 50 MG
750 VIAL (EA) INTRAVENOUS ONCE
Refills: 0 | Status: COMPLETED | OUTPATIENT
Start: 2022-01-01 | End: 2022-01-01

## 2022-01-01 RX ORDER — NIFEDIPINE 30 MG
1 TABLET, EXTENDED RELEASE 24 HR ORAL
Qty: 30 | Refills: 0
Start: 2022-01-01 | End: 2022-08-20

## 2022-01-01 RX ORDER — MEROPENEM 1 G/30ML
1000 INJECTION INTRAVENOUS ONCE
Refills: 0 | Status: COMPLETED | OUTPATIENT
Start: 2022-01-01 | End: 2022-01-01

## 2022-01-01 RX ORDER — VANCOMYCIN HCL 1 G
2000 VIAL (EA) INTRAVENOUS ONCE
Refills: 0 | Status: COMPLETED | OUTPATIENT
Start: 2022-01-01 | End: 2022-01-01

## 2022-01-01 RX ORDER — PAMIDRONATE DISODIUM 9 MG/ML
90 INJECTION, SOLUTION INTRAVENOUS ONCE
Refills: 0 | Status: COMPLETED | OUTPATIENT
Start: 2022-01-01 | End: 2022-01-01

## 2022-01-01 RX ORDER — DEXTROSE 50 % IN WATER 50 %
25 SYRINGE (ML) INTRAVENOUS ONCE
Refills: 0 | Status: COMPLETED | OUTPATIENT
Start: 2022-01-01 | End: 2022-01-01

## 2022-01-01 RX ORDER — LISINOPRIL 2.5 MG/1
1 TABLET ORAL
Qty: 0 | Refills: 0 | DISCHARGE

## 2022-01-01 RX ORDER — OXYCODONE AND ACETAMINOPHEN 5; 325 MG/1; MG/1
1 TABLET ORAL EVERY 4 HOURS
Refills: 0 | Status: DISCONTINUED | OUTPATIENT
Start: 2022-01-01 | End: 2022-01-01

## 2022-01-01 RX ORDER — MIDAZOLAM HYDROCHLORIDE 1 MG/ML
2 INJECTION, SOLUTION INTRAMUSCULAR; INTRAVENOUS ONCE
Refills: 0 | Status: DISCONTINUED | OUTPATIENT
Start: 2022-01-01 | End: 2022-01-01

## 2022-01-01 RX ORDER — DIPHENHYDRAMINE HCL 50 MG
25 CAPSULE ORAL ONCE
Refills: 0 | Status: COMPLETED | OUTPATIENT
Start: 2022-01-01 | End: 2022-01-01

## 2022-01-01 RX ORDER — ALLOPURINOL 300 MG
300 TABLET ORAL
Refills: 0 | Status: DISCONTINUED | OUTPATIENT
Start: 2022-01-01 | End: 2022-01-01

## 2022-01-01 RX ORDER — HYDROMORPHONE HYDROCHLORIDE 2 MG/ML
1 INJECTION INTRAMUSCULAR; INTRAVENOUS; SUBCUTANEOUS
Qty: 28 | Refills: 0
Start: 2022-01-01 | End: 2022-01-01

## 2022-01-01 RX ORDER — ONDANSETRON 8 MG/1
1 TABLET, FILM COATED ORAL
Qty: 42 | Refills: 0
Start: 2022-01-01 | End: 2022-01-01

## 2022-01-01 RX ORDER — DOBUTAMINE HCL 250MG/20ML
5 VIAL (ML) INTRAVENOUS
Qty: 500 | Refills: 0 | Status: DISCONTINUED | OUTPATIENT
Start: 2022-01-01 | End: 2022-01-01

## 2022-01-01 RX ORDER — POTASSIUM CHLORIDE 20 MEQ
20 PACKET (EA) ORAL ONCE
Refills: 0 | Status: DISCONTINUED | OUTPATIENT
Start: 2022-01-01 | End: 2022-01-01

## 2022-01-01 RX ORDER — PACLITAXEL 6 MG/ML
458 INJECTION, SOLUTION, CONCENTRATE INTRAVENOUS ONCE
Refills: 0 | Status: COMPLETED | OUTPATIENT
Start: 2022-01-01 | End: 2022-01-01

## 2022-01-01 RX ORDER — METHOCARBAMOL 500 MG/1
500 TABLET, FILM COATED ORAL ONCE
Refills: 0 | Status: COMPLETED | OUTPATIENT
Start: 2022-01-01 | End: 2022-01-01

## 2022-01-01 RX ORDER — SIMETHICONE 80 MG/1
80 TABLET, CHEWABLE ORAL
Refills: 0 | Status: DISCONTINUED | OUTPATIENT
Start: 2022-01-01 | End: 2022-01-01

## 2022-01-01 RX ORDER — SODIUM,POTASSIUM PHOSPHATES 278-250MG
1 POWDER IN PACKET (EA) ORAL ONCE
Refills: 0 | Status: DISCONTINUED | OUTPATIENT
Start: 2022-01-01 | End: 2022-01-01

## 2022-01-01 RX ADMIN — SODIUM CHLORIDE 75 MILLILITER(S): 9 INJECTION INTRAMUSCULAR; INTRAVENOUS; SUBCUTANEOUS at 14:45

## 2022-01-01 RX ADMIN — FENTANYL CITRATE 9.89 MICROGRAM(S)/KG/HR: 50 INJECTION INTRAVENOUS at 18:01

## 2022-01-01 RX ADMIN — OXYCODONE AND ACETAMINOPHEN 1 TABLET(S): 5; 325 TABLET ORAL at 06:35

## 2022-01-01 RX ADMIN — Medication 60 MILLIGRAM(S): at 05:18

## 2022-01-01 RX ADMIN — Medication 200 MILLIGRAM(S): at 16:32

## 2022-01-01 RX ADMIN — Medication 101 MILLIGRAM(S): at 14:23

## 2022-01-01 RX ADMIN — Medication 81 MILLIGRAM(S): at 11:12

## 2022-01-01 RX ADMIN — Medication 5 MILLIGRAM(S): at 21:57

## 2022-01-01 RX ADMIN — OXYCODONE AND ACETAMINOPHEN 1 TABLET(S): 5; 325 TABLET ORAL at 10:37

## 2022-01-01 RX ADMIN — Medication 200 MILLIGRAM(S): at 05:17

## 2022-01-01 RX ADMIN — Medication 20 MILLIEQUIVALENT(S): at 18:36

## 2022-01-01 RX ADMIN — HEPARIN SODIUM 5000 UNIT(S): 5000 INJECTION INTRAVENOUS; SUBCUTANEOUS at 06:51

## 2022-01-01 RX ADMIN — Medication 2 MILLIGRAM(S): at 18:36

## 2022-01-01 RX ADMIN — CHLORHEXIDINE GLUCONATE 1 APPLICATION(S): 213 SOLUTION TOPICAL at 18:08

## 2022-01-01 RX ADMIN — OXYCODONE AND ACETAMINOPHEN 1 TABLET(S): 5; 325 TABLET ORAL at 18:25

## 2022-01-01 RX ADMIN — HEPARIN SODIUM 5000 UNIT(S): 5000 INJECTION INTRAVENOUS; SUBCUTANEOUS at 13:08

## 2022-01-01 RX ADMIN — Medication 200 MILLIGRAM(S): at 05:23

## 2022-01-01 RX ADMIN — NYSTATIN CREAM 1 APPLICATION(S): 100000 CREAM TOPICAL at 18:07

## 2022-01-01 RX ADMIN — DULOXETINE HYDROCHLORIDE 60 MILLIGRAM(S): 30 CAPSULE, DELAYED RELEASE ORAL at 11:12

## 2022-01-01 RX ADMIN — NYSTATIN CREAM 1 APPLICATION(S): 100000 CREAM TOPICAL at 18:38

## 2022-01-01 RX ADMIN — ENOXAPARIN SODIUM 40 MILLIGRAM(S): 100 INJECTION SUBCUTANEOUS at 15:05

## 2022-01-01 RX ADMIN — Medication 2 MILLIGRAM(S): at 10:22

## 2022-01-01 RX ADMIN — OXYCODONE AND ACETAMINOPHEN 1 TABLET(S): 5; 325 TABLET ORAL at 18:45

## 2022-01-01 RX ADMIN — PACLITAXEL 458 MILLIGRAM(S): 6 INJECTION, SOLUTION, CONCENTRATE INTRAVENOUS at 16:18

## 2022-01-01 RX ADMIN — Medication 325 MILLIGRAM(S): at 12:20

## 2022-01-01 RX ADMIN — Medication 150 MICROGRAM(S): at 06:27

## 2022-01-01 RX ADMIN — SODIUM CHLORIDE 1000 MILLILITER(S): 9 INJECTION, SOLUTION INTRAVENOUS at 21:53

## 2022-01-01 RX ADMIN — Medication 5 MILLIGRAM(S): at 21:23

## 2022-01-01 RX ADMIN — SODIUM CHLORIDE 150 MILLILITER(S): 9 INJECTION INTRAMUSCULAR; INTRAVENOUS; SUBCUTANEOUS at 10:13

## 2022-01-01 RX ADMIN — ENOXAPARIN SODIUM 40 MILLIGRAM(S): 100 INJECTION SUBCUTANEOUS at 06:15

## 2022-01-01 RX ADMIN — OXYCODONE AND ACETAMINOPHEN 1 TABLET(S): 5; 325 TABLET ORAL at 14:59

## 2022-01-01 RX ADMIN — OXYCODONE AND ACETAMINOPHEN 2 TABLET(S): 5; 325 TABLET ORAL at 06:16

## 2022-01-01 RX ADMIN — Medication 150 MICROGRAM(S): at 05:19

## 2022-01-01 RX ADMIN — OXYCODONE AND ACETAMINOPHEN 2 TABLET(S): 5; 325 TABLET ORAL at 07:20

## 2022-01-01 RX ADMIN — Medication 2 MILLIGRAM(S): at 06:20

## 2022-01-01 RX ADMIN — MORPHINE SULFATE 2 MILLIGRAM(S): 50 CAPSULE, EXTENDED RELEASE ORAL at 19:28

## 2022-01-01 RX ADMIN — Medication 100 MILLIGRAM(S): at 17:45

## 2022-01-01 RX ADMIN — NYSTATIN CREAM 1 APPLICATION(S): 100000 CREAM TOPICAL at 06:10

## 2022-01-01 RX ADMIN — Medication 2 MILLIGRAM(S): at 21:40

## 2022-01-01 RX ADMIN — Medication 20 MILLIGRAM(S): at 06:00

## 2022-01-01 RX ADMIN — MIDAZOLAM HYDROCHLORIDE 3.52 MG/KG/HR: 1 INJECTION, SOLUTION INTRAMUSCULAR; INTRAVENOUS at 13:47

## 2022-01-01 RX ADMIN — Medication 325 MILLIGRAM(S): at 13:13

## 2022-01-01 RX ADMIN — HEPARIN SODIUM 5000 UNIT(S): 5000 INJECTION INTRAVENOUS; SUBCUTANEOUS at 13:42

## 2022-01-01 RX ADMIN — OXYCODONE AND ACETAMINOPHEN 1 TABLET(S): 5; 325 TABLET ORAL at 23:16

## 2022-01-01 RX ADMIN — Medication 300 MILLIGRAM(S): at 06:49

## 2022-01-01 RX ADMIN — HEPARIN SODIUM 5000 UNIT(S): 5000 INJECTION INTRAVENOUS; SUBCUTANEOUS at 22:33

## 2022-01-01 RX ADMIN — Medication 200 MILLIGRAM(S): at 18:37

## 2022-01-01 RX ADMIN — SODIUM CHLORIDE 100 MILLILITER(S): 9 INJECTION INTRAMUSCULAR; INTRAVENOUS; SUBCUTANEOUS at 09:22

## 2022-01-01 RX ADMIN — Medication 200 MILLIGRAM(S): at 05:25

## 2022-01-01 RX ADMIN — SODIUM CHLORIDE 75 MILLILITER(S): 9 INJECTION INTRAMUSCULAR; INTRAVENOUS; SUBCUTANEOUS at 10:13

## 2022-01-01 RX ADMIN — OXYCODONE AND ACETAMINOPHEN 1 TABLET(S): 5; 325 TABLET ORAL at 08:12

## 2022-01-01 RX ADMIN — OXYCODONE AND ACETAMINOPHEN 1 TABLET(S): 5; 325 TABLET ORAL at 06:11

## 2022-01-01 RX ADMIN — Medication 300 MILLIGRAM(S): at 18:07

## 2022-01-01 RX ADMIN — SODIUM CHLORIDE 1000 MILLILITER(S): 9 INJECTION INTRAMUSCULAR; INTRAVENOUS; SUBCUTANEOUS at 08:00

## 2022-01-01 RX ADMIN — Medication 150 MICROGRAM(S): at 05:26

## 2022-01-01 RX ADMIN — DULOXETINE HYDROCHLORIDE 60 MILLIGRAM(S): 30 CAPSULE, DELAYED RELEASE ORAL at 11:37

## 2022-01-01 RX ADMIN — Medication 150 MICROGRAM(S): at 05:44

## 2022-01-01 RX ADMIN — OXYCODONE AND ACETAMINOPHEN 1 TABLET(S): 5; 325 TABLET ORAL at 00:37

## 2022-01-01 RX ADMIN — OXYCODONE AND ACETAMINOPHEN 2 TABLET(S): 5; 325 TABLET ORAL at 15:15

## 2022-01-01 RX ADMIN — Medication 2 MILLIGRAM(S): at 21:15

## 2022-01-01 RX ADMIN — Medication 30 MILLIGRAM(S): at 05:26

## 2022-01-01 RX ADMIN — SODIUM CHLORIDE 75 MILLILITER(S): 9 INJECTION INTRAMUSCULAR; INTRAVENOUS; SUBCUTANEOUS at 06:10

## 2022-01-01 RX ADMIN — Medication 200 MILLIGRAM(S): at 18:42

## 2022-01-01 RX ADMIN — OXYCODONE AND ACETAMINOPHEN 1 TABLET(S): 5; 325 TABLET ORAL at 01:55

## 2022-01-01 RX ADMIN — OXYCODONE AND ACETAMINOPHEN 1 TABLET(S): 5; 325 TABLET ORAL at 01:41

## 2022-01-01 RX ADMIN — ZOLPIDEM TARTRATE 5 MILLIGRAM(S): 10 TABLET ORAL at 21:56

## 2022-01-01 RX ADMIN — Medication 1 PACKET(S): at 08:43

## 2022-01-01 RX ADMIN — Medication 20 MILLIEQUIVALENT(S): at 12:20

## 2022-01-01 RX ADMIN — OXYCODONE AND ACETAMINOPHEN 1 TABLET(S): 5; 325 TABLET ORAL at 12:27

## 2022-01-01 RX ADMIN — FAMOTIDINE 20 MILLIGRAM(S): 10 INJECTION INTRAVENOUS at 17:17

## 2022-01-01 RX ADMIN — Medication 2 MILLIGRAM(S): at 02:34

## 2022-01-01 RX ADMIN — Medication 81 MILLIGRAM(S): at 13:13

## 2022-01-01 RX ADMIN — HEPARIN SODIUM 5000 UNIT(S): 5000 INJECTION INTRAVENOUS; SUBCUTANEOUS at 06:17

## 2022-01-01 RX ADMIN — Medication 325 MILLIGRAM(S): at 12:17

## 2022-01-01 RX ADMIN — Medication 325 MILLIGRAM(S): at 11:21

## 2022-01-01 RX ADMIN — Medication 650 MILLIGRAM(S): at 13:13

## 2022-01-01 RX ADMIN — Medication 2 MILLIGRAM(S): at 21:36

## 2022-01-01 RX ADMIN — Medication 200 MILLIGRAM(S): at 06:18

## 2022-01-01 RX ADMIN — Medication 2 MILLIGRAM(S): at 17:22

## 2022-01-01 RX ADMIN — OXYCODONE AND ACETAMINOPHEN 1 TABLET(S): 5; 325 TABLET ORAL at 03:51

## 2022-01-01 RX ADMIN — OXYCODONE AND ACETAMINOPHEN 1 TABLET(S): 5; 325 TABLET ORAL at 11:37

## 2022-01-01 RX ADMIN — Medication 300 MILLIGRAM(S): at 21:41

## 2022-01-01 RX ADMIN — SODIUM CHLORIDE 1000 MILLILITER(S): 9 INJECTION INTRAMUSCULAR; INTRAVENOUS; SUBCUTANEOUS at 17:18

## 2022-01-01 RX ADMIN — Medication 16.5 MICROGRAM(S)/KG/MIN: at 10:17

## 2022-01-01 RX ADMIN — Medication 200 MILLIGRAM(S): at 17:39

## 2022-01-01 RX ADMIN — Medication 300 MILLIGRAM(S): at 05:43

## 2022-01-01 RX ADMIN — OXYCODONE AND ACETAMINOPHEN 1 TABLET(S): 5; 325 TABLET ORAL at 10:57

## 2022-01-01 RX ADMIN — ONDANSETRON 4 MILLIGRAM(S): 8 TABLET, FILM COATED ORAL at 17:17

## 2022-01-01 RX ADMIN — Medication 200 MILLIGRAM(S): at 05:15

## 2022-01-01 RX ADMIN — OXYCODONE AND ACETAMINOPHEN 1 TABLET(S): 5; 325 TABLET ORAL at 16:35

## 2022-01-01 RX ADMIN — Medication 1 PACKET(S): at 22:16

## 2022-01-01 RX ADMIN — Medication 150 MICROGRAM(S): at 06:01

## 2022-01-01 RX ADMIN — Medication 300 MILLIGRAM(S): at 17:52

## 2022-01-01 RX ADMIN — NYSTATIN CREAM 1 APPLICATION(S): 100000 CREAM TOPICAL at 17:32

## 2022-01-01 RX ADMIN — OXYCODONE AND ACETAMINOPHEN 1 TABLET(S): 5; 325 TABLET ORAL at 18:24

## 2022-01-01 RX ADMIN — OXYCODONE AND ACETAMINOPHEN 1 TABLET(S): 5; 325 TABLET ORAL at 21:55

## 2022-01-01 RX ADMIN — Medication 2 MILLIGRAM(S): at 18:38

## 2022-01-01 RX ADMIN — Medication 2 MILLIGRAM(S): at 05:43

## 2022-01-01 RX ADMIN — Medication 60 MILLIGRAM(S): at 06:48

## 2022-01-01 RX ADMIN — OXYCODONE AND ACETAMINOPHEN 1 TABLET(S): 5; 325 TABLET ORAL at 06:45

## 2022-01-01 RX ADMIN — Medication 81 MILLIGRAM(S): at 11:21

## 2022-01-01 RX ADMIN — METHOCARBAMOL 500 MILLIGRAM(S): 500 TABLET, FILM COATED ORAL at 00:58

## 2022-01-01 RX ADMIN — Medication 81 MILLIGRAM(S): at 14:17

## 2022-01-01 RX ADMIN — ONDANSETRON 8 MILLIGRAM(S): 8 TABLET, FILM COATED ORAL at 18:25

## 2022-01-01 RX ADMIN — ONDANSETRON 8 MILLIGRAM(S): 8 TABLET, FILM COATED ORAL at 21:03

## 2022-01-01 RX ADMIN — Medication 650 MILLIGRAM(S): at 06:18

## 2022-01-01 RX ADMIN — DULOXETINE HYDROCHLORIDE 60 MILLIGRAM(S): 30 CAPSULE, DELAYED RELEASE ORAL at 11:21

## 2022-01-01 RX ADMIN — OXYCODONE AND ACETAMINOPHEN 1 TABLET(S): 5; 325 TABLET ORAL at 18:14

## 2022-01-01 RX ADMIN — HEPARIN SODIUM 5000 UNIT(S): 5000 INJECTION INTRAVENOUS; SUBCUTANEOUS at 05:19

## 2022-01-01 RX ADMIN — CHLORHEXIDINE GLUCONATE 1 APPLICATION(S): 213 SOLUTION TOPICAL at 05:58

## 2022-01-01 RX ADMIN — OXYCODONE AND ACETAMINOPHEN 1 TABLET(S): 5; 325 TABLET ORAL at 04:40

## 2022-01-01 RX ADMIN — Medication 200 MILLIGRAM(S): at 05:56

## 2022-01-01 RX ADMIN — OXYCODONE AND ACETAMINOPHEN 1 TABLET(S): 5; 325 TABLET ORAL at 22:30

## 2022-01-01 RX ADMIN — OXYCODONE AND ACETAMINOPHEN 1 TABLET(S): 5; 325 TABLET ORAL at 00:16

## 2022-01-01 RX ADMIN — NYSTATIN CREAM 1 APPLICATION(S): 100000 CREAM TOPICAL at 06:03

## 2022-01-01 RX ADMIN — Medication 650 MILLIGRAM(S): at 06:15

## 2022-01-01 RX ADMIN — Medication 150 MILLIEQUIVALENT(S): at 18:51

## 2022-01-01 RX ADMIN — DULOXETINE HYDROCHLORIDE 60 MILLIGRAM(S): 30 CAPSULE, DELAYED RELEASE ORAL at 12:20

## 2022-01-01 RX ADMIN — Medication 150 MICROGRAM(S): at 05:36

## 2022-01-01 RX ADMIN — FENTANYL CITRATE 50 MICROGRAM(S): 50 INJECTION INTRAVENOUS at 12:49

## 2022-01-01 RX ADMIN — OXYCODONE AND ACETAMINOPHEN 1 TABLET(S): 5; 325 TABLET ORAL at 18:02

## 2022-01-01 RX ADMIN — Medication 1 PACKET(S): at 10:01

## 2022-01-01 RX ADMIN — Medication 200 MILLIGRAM(S): at 18:38

## 2022-01-01 RX ADMIN — VASOPRESSIN 2.4 UNIT(S)/MIN: 20 INJECTION INTRAVENOUS at 12:52

## 2022-01-01 RX ADMIN — Medication 30 MILLIGRAM(S): at 05:36

## 2022-01-01 RX ADMIN — Medication 650 MILLIGRAM(S): at 14:16

## 2022-01-01 RX ADMIN — Medication 40 MILLIGRAM(S): at 12:55

## 2022-01-01 RX ADMIN — Medication 2 MILLIGRAM(S): at 10:41

## 2022-01-01 RX ADMIN — HEPARIN SODIUM 5000 UNIT(S): 5000 INJECTION INTRAVENOUS; SUBCUTANEOUS at 21:47

## 2022-01-01 RX ADMIN — Medication 62.5 MILLIMOLE(S): at 12:52

## 2022-01-01 RX ADMIN — Medication 300 MILLIGRAM(S): at 05:48

## 2022-01-01 RX ADMIN — ONDANSETRON 8 MILLIGRAM(S): 8 TABLET, FILM COATED ORAL at 22:17

## 2022-01-01 RX ADMIN — HEPARIN SODIUM 5000 UNIT(S): 5000 INJECTION INTRAVENOUS; SUBCUTANEOUS at 05:16

## 2022-01-01 RX ADMIN — SODIUM CHLORIDE 1000 MILLILITER(S): 9 INJECTION, SOLUTION INTRAVENOUS at 10:16

## 2022-01-01 RX ADMIN — Medication 2 MILLIGRAM(S): at 01:06

## 2022-01-01 RX ADMIN — HEPARIN SODIUM 5000 UNIT(S): 5000 INJECTION INTRAVENOUS; SUBCUTANEOUS at 21:57

## 2022-01-01 RX ADMIN — OXYCODONE AND ACETAMINOPHEN 1 TABLET(S): 5; 325 TABLET ORAL at 06:37

## 2022-01-01 RX ADMIN — Medication 150 MICROGRAM(S): at 06:10

## 2022-01-01 RX ADMIN — Medication 100 MILLIGRAM(S): at 06:01

## 2022-01-01 RX ADMIN — Medication 81 MILLIGRAM(S): at 12:00

## 2022-01-01 RX ADMIN — Medication 325 MILLIGRAM(S): at 11:38

## 2022-01-01 RX ADMIN — OXYCODONE AND ACETAMINOPHEN 1 TABLET(S): 5; 325 TABLET ORAL at 07:48

## 2022-01-01 RX ADMIN — Medication 300 MILLIGRAM(S): at 14:01

## 2022-01-01 RX ADMIN — Medication 650 MILLIGRAM(S): at 06:28

## 2022-01-01 RX ADMIN — Medication 81 MILLIGRAM(S): at 12:14

## 2022-01-01 RX ADMIN — Medication 100 MILLIGRAM(S): at 21:39

## 2022-01-01 RX ADMIN — Medication 2 MILLIGRAM(S): at 23:33

## 2022-01-01 RX ADMIN — SODIUM CHLORIDE 100 MILLILITER(S): 9 INJECTION INTRAMUSCULAR; INTRAVENOUS; SUBCUTANEOUS at 18:38

## 2022-01-01 RX ADMIN — NYSTATIN CREAM 1 APPLICATION(S): 100000 CREAM TOPICAL at 17:41

## 2022-01-01 RX ADMIN — Medication 5 MILLIGRAM(S): at 21:26

## 2022-01-01 RX ADMIN — SODIUM CHLORIDE 150 MILLILITER(S): 9 INJECTION INTRAMUSCULAR; INTRAVENOUS; SUBCUTANEOUS at 11:32

## 2022-01-01 RX ADMIN — Medication 300 MILLIGRAM(S): at 17:39

## 2022-01-01 RX ADMIN — Medication 2 MILLIGRAM(S): at 13:32

## 2022-01-01 RX ADMIN — HEPARIN SODIUM 5000 UNIT(S): 5000 INJECTION INTRAVENOUS; SUBCUTANEOUS at 12:20

## 2022-01-01 RX ADMIN — LISINOPRIL 40 MILLIGRAM(S): 2.5 TABLET ORAL at 06:01

## 2022-01-01 RX ADMIN — OXYCODONE AND ACETAMINOPHEN 1 TABLET(S): 5; 325 TABLET ORAL at 23:45

## 2022-01-01 RX ADMIN — NYSTATIN CREAM 1 APPLICATION(S): 100000 CREAM TOPICAL at 17:21

## 2022-01-01 RX ADMIN — Medication 100 MILLIGRAM(S): at 23:08

## 2022-01-01 RX ADMIN — Medication 30 MILLIGRAM(S): at 17:39

## 2022-01-01 RX ADMIN — HEPARIN SODIUM 5000 UNIT(S): 5000 INJECTION INTRAVENOUS; SUBCUTANEOUS at 13:25

## 2022-01-01 RX ADMIN — SODIUM CHLORIDE 100 MILLILITER(S): 9 INJECTION INTRAMUSCULAR; INTRAVENOUS; SUBCUTANEOUS at 06:35

## 2022-01-01 RX ADMIN — Medication 100 MILLIGRAM(S): at 12:49

## 2022-01-01 RX ADMIN — Medication 200 MILLIGRAM(S): at 17:53

## 2022-01-01 RX ADMIN — Medication 150 MICROGRAM(S): at 06:34

## 2022-01-01 RX ADMIN — DULOXETINE HYDROCHLORIDE 60 MILLIGRAM(S): 30 CAPSULE, DELAYED RELEASE ORAL at 12:00

## 2022-01-01 RX ADMIN — OXYCODONE AND ACETAMINOPHEN 1 TABLET(S): 5; 325 TABLET ORAL at 02:30

## 2022-01-01 RX ADMIN — OXYCODONE AND ACETAMINOPHEN 1 TABLET(S): 5; 325 TABLET ORAL at 00:21

## 2022-01-01 RX ADMIN — NYSTATIN CREAM 1 APPLICATION(S): 100000 CREAM TOPICAL at 06:22

## 2022-01-01 RX ADMIN — Medication 200 MILLIGRAM(S): at 17:44

## 2022-01-01 RX ADMIN — NYSTATIN CREAM 1 APPLICATION(S): 100000 CREAM TOPICAL at 17:43

## 2022-01-01 RX ADMIN — Medication 325 MILLIGRAM(S): at 12:14

## 2022-01-01 RX ADMIN — FAMOTIDINE 104 MILLIGRAM(S): 10 INJECTION INTRAVENOUS at 14:23

## 2022-01-01 RX ADMIN — Medication 40 MILLIEQUIVALENT(S): at 17:39

## 2022-01-01 RX ADMIN — TRAMADOL HYDROCHLORIDE 25 MILLIGRAM(S): 50 TABLET ORAL at 12:51

## 2022-01-01 RX ADMIN — Medication 150 MICROGRAM(S): at 05:56

## 2022-01-01 RX ADMIN — ONDANSETRON 8 MILLIGRAM(S): 8 TABLET, FILM COATED ORAL at 15:13

## 2022-01-01 RX ADMIN — HEPARIN SODIUM 5000 UNIT(S): 5000 INJECTION INTRAVENOUS; SUBCUTANEOUS at 13:33

## 2022-01-01 RX ADMIN — OXYCODONE AND ACETAMINOPHEN 1 TABLET(S): 5; 325 TABLET ORAL at 08:18

## 2022-01-01 RX ADMIN — Medication 1 PACKET(S): at 17:54

## 2022-01-01 RX ADMIN — Medication 81 MILLIGRAM(S): at 11:37

## 2022-01-01 RX ADMIN — Medication 150 MICROGRAM(S): at 06:18

## 2022-01-01 RX ADMIN — Medication 300 MILLIGRAM(S): at 18:25

## 2022-01-01 RX ADMIN — DULOXETINE HYDROCHLORIDE 60 MILLIGRAM(S): 30 CAPSULE, DELAYED RELEASE ORAL at 12:54

## 2022-01-01 RX ADMIN — Medication 200 MILLIGRAM(S): at 17:23

## 2022-01-01 RX ADMIN — SODIUM CHLORIDE 100 MILLILITER(S): 9 INJECTION INTRAMUSCULAR; INTRAVENOUS; SUBCUTANEOUS at 06:34

## 2022-01-01 RX ADMIN — CHLORHEXIDINE GLUCONATE 1 APPLICATION(S): 213 SOLUTION TOPICAL at 07:09

## 2022-01-01 RX ADMIN — Medication 25 GRAM(S): at 02:32

## 2022-01-01 RX ADMIN — NYSTATIN CREAM 1 APPLICATION(S): 100000 CREAM TOPICAL at 06:49

## 2022-01-01 RX ADMIN — Medication 150 MILLIEQUIVALENT(S): at 12:50

## 2022-01-01 RX ADMIN — Medication 200 MILLIGRAM(S): at 06:48

## 2022-01-01 RX ADMIN — Medication 200 MILLIGRAM(S): at 06:27

## 2022-01-01 RX ADMIN — LISINOPRIL 40 MILLIGRAM(S): 2.5 TABLET ORAL at 05:21

## 2022-01-01 RX ADMIN — DULOXETINE HYDROCHLORIDE 60 MILLIGRAM(S): 30 CAPSULE, DELAYED RELEASE ORAL at 13:13

## 2022-01-01 RX ADMIN — OXYCODONE AND ACETAMINOPHEN 1 TABLET(S): 5; 325 TABLET ORAL at 12:50

## 2022-01-01 RX ADMIN — Medication 200 MILLIGRAM(S): at 17:45

## 2022-01-01 RX ADMIN — OXYCODONE AND ACETAMINOPHEN 1 TABLET(S): 5; 325 TABLET ORAL at 16:34

## 2022-01-01 RX ADMIN — NYSTATIN CREAM 1 APPLICATION(S): 100000 CREAM TOPICAL at 06:27

## 2022-01-01 RX ADMIN — Medication 25 GRAM(S): at 04:36

## 2022-01-01 RX ADMIN — LISINOPRIL 40 MILLIGRAM(S): 2.5 TABLET ORAL at 06:34

## 2022-01-01 RX ADMIN — NYSTATIN CREAM 1 APPLICATION(S): 100000 CREAM TOPICAL at 05:45

## 2022-01-01 RX ADMIN — HEPARIN SODIUM 5000 UNIT(S): 5000 INJECTION INTRAVENOUS; SUBCUTANEOUS at 22:20

## 2022-01-01 RX ADMIN — PANTOPRAZOLE SODIUM 40 MILLIGRAM(S): 20 TABLET, DELAYED RELEASE ORAL at 18:08

## 2022-01-01 RX ADMIN — IRON SUCROSE 110 MILLIGRAM(S): 20 INJECTION, SOLUTION INTRAVENOUS at 12:53

## 2022-01-01 RX ADMIN — HEPARIN SODIUM 5000 UNIT(S): 5000 INJECTION INTRAVENOUS; SUBCUTANEOUS at 05:47

## 2022-01-01 RX ADMIN — OXYCODONE AND ACETAMINOPHEN 1 TABLET(S): 5; 325 TABLET ORAL at 04:44

## 2022-01-01 RX ADMIN — ENOXAPARIN SODIUM 40 MILLIGRAM(S): 100 INJECTION SUBCUTANEOUS at 06:02

## 2022-01-01 RX ADMIN — Medication 200 MILLIGRAM(S): at 06:00

## 2022-01-01 RX ADMIN — Medication 2 MILLIGRAM(S): at 06:26

## 2022-01-01 RX ADMIN — DULOXETINE HYDROCHLORIDE 60 MILLIGRAM(S): 30 CAPSULE, DELAYED RELEASE ORAL at 11:34

## 2022-01-01 RX ADMIN — Medication 2 MILLIGRAM(S): at 06:15

## 2022-01-01 RX ADMIN — Medication 81 MILLIGRAM(S): at 11:38

## 2022-01-01 RX ADMIN — Medication 300 MILLIGRAM(S): at 05:16

## 2022-01-01 RX ADMIN — SODIUM CHLORIDE 200 MILLILITER(S): 9 INJECTION, SOLUTION INTRAVENOUS at 12:57

## 2022-01-01 RX ADMIN — Medication 250 MILLIGRAM(S): at 10:15

## 2022-01-01 RX ADMIN — OXYCODONE AND ACETAMINOPHEN 1 TABLET(S): 5; 325 TABLET ORAL at 18:50

## 2022-01-01 RX ADMIN — Medication 30 MILLIGRAM(S): at 06:10

## 2022-01-01 RX ADMIN — Medication 325 MILLIGRAM(S): at 11:37

## 2022-01-01 RX ADMIN — Medication 81 MILLIGRAM(S): at 12:20

## 2022-01-01 RX ADMIN — MORPHINE SULFATE 4 MILLIGRAM(S): 50 CAPSULE, EXTENDED RELEASE ORAL at 03:05

## 2022-01-01 RX ADMIN — Medication 1 PACKET(S): at 17:32

## 2022-01-01 RX ADMIN — Medication 85 MILLIMOLE(S): at 22:33

## 2022-01-01 RX ADMIN — Medication 8.25 MICROGRAM(S)/KG/MIN: at 13:47

## 2022-01-01 RX ADMIN — Medication 2 MILLIGRAM(S): at 11:12

## 2022-01-01 RX ADMIN — HEPARIN SODIUM 5000 UNIT(S): 5000 INJECTION INTRAVENOUS; SUBCUTANEOUS at 21:06

## 2022-01-01 RX ADMIN — Medication 5 MILLIGRAM(S): at 21:48

## 2022-01-01 RX ADMIN — OXYCODONE AND ACETAMINOPHEN 1 TABLET(S): 5; 325 TABLET ORAL at 12:57

## 2022-01-01 RX ADMIN — Medication 2 MILLIGRAM(S): at 13:13

## 2022-01-01 RX ADMIN — Medication 200 MILLIGRAM(S): at 06:34

## 2022-01-01 RX ADMIN — Medication 2 MILLIGRAM(S): at 13:42

## 2022-01-01 RX ADMIN — HEPARIN SODIUM 5000 UNIT(S): 5000 INJECTION INTRAVENOUS; SUBCUTANEOUS at 05:23

## 2022-01-01 RX ADMIN — Medication 300 MILLIGRAM(S): at 06:18

## 2022-01-01 RX ADMIN — Medication 1 PACKET(S): at 11:21

## 2022-01-01 RX ADMIN — LISINOPRIL 40 MILLIGRAM(S): 2.5 TABLET ORAL at 05:26

## 2022-01-01 RX ADMIN — Medication 650 MILLIGRAM(S): at 21:15

## 2022-01-01 RX ADMIN — OXYCODONE AND ACETAMINOPHEN 1 TABLET(S): 5; 325 TABLET ORAL at 13:02

## 2022-01-01 RX ADMIN — LISINOPRIL 40 MILLIGRAM(S): 2.5 TABLET ORAL at 05:19

## 2022-01-01 RX ADMIN — NYSTATIN CREAM 1 APPLICATION(S): 100000 CREAM TOPICAL at 06:53

## 2022-01-01 RX ADMIN — Medication 25 GRAM(S): at 21:26

## 2022-01-01 RX ADMIN — ENOXAPARIN SODIUM 40 MILLIGRAM(S): 100 INJECTION SUBCUTANEOUS at 06:28

## 2022-01-01 RX ADMIN — LISINOPRIL 40 MILLIGRAM(S): 2.5 TABLET ORAL at 06:10

## 2022-01-01 RX ADMIN — Medication 2 MILLIGRAM(S): at 02:09

## 2022-01-01 RX ADMIN — CHLORHEXIDINE GLUCONATE 1 APPLICATION(S): 213 SOLUTION TOPICAL at 06:04

## 2022-01-01 RX ADMIN — Medication 325 MILLIGRAM(S): at 11:59

## 2022-01-01 RX ADMIN — Medication 200 MILLIGRAM(S): at 18:25

## 2022-01-01 RX ADMIN — Medication 200 MILLIGRAM(S): at 05:43

## 2022-01-01 RX ADMIN — ONDANSETRON 8 MILLIGRAM(S): 8 TABLET, FILM COATED ORAL at 12:17

## 2022-01-01 RX ADMIN — HEPARIN SODIUM 5000 UNIT(S): 5000 INJECTION INTRAVENOUS; SUBCUTANEOUS at 05:22

## 2022-01-01 RX ADMIN — HEPARIN SODIUM 5000 UNIT(S): 5000 INJECTION INTRAVENOUS; SUBCUTANEOUS at 05:44

## 2022-01-01 RX ADMIN — CHLORHEXIDINE GLUCONATE 15 MILLILITER(S): 213 SOLUTION TOPICAL at 18:07

## 2022-01-01 RX ADMIN — IRON SUCROSE 110 MILLIGRAM(S): 20 INJECTION, SOLUTION INTRAVENOUS at 12:26

## 2022-01-01 RX ADMIN — Medication 300 MILLIGRAM(S): at 05:23

## 2022-01-01 RX ADMIN — Medication 30 MILLIGRAM(S): at 06:19

## 2022-01-01 RX ADMIN — OXYCODONE AND ACETAMINOPHEN 1 TABLET(S): 5; 325 TABLET ORAL at 07:43

## 2022-01-01 RX ADMIN — Medication 200 MILLIGRAM(S): at 17:52

## 2022-01-01 RX ADMIN — MEROPENEM 100 MILLIGRAM(S): 1 INJECTION INTRAVENOUS at 10:12

## 2022-01-01 RX ADMIN — NYSTATIN CREAM 1 APPLICATION(S): 100000 CREAM TOPICAL at 05:22

## 2022-01-01 RX ADMIN — OXYCODONE AND ACETAMINOPHEN 2 TABLET(S): 5; 325 TABLET ORAL at 06:15

## 2022-01-01 RX ADMIN — Medication 150 MICROGRAM(S): at 05:21

## 2022-01-01 RX ADMIN — Medication 200 MILLIGRAM(S): at 05:19

## 2022-01-01 RX ADMIN — Medication 200 MILLIGRAM(S): at 05:18

## 2022-01-01 RX ADMIN — OXYCODONE AND ACETAMINOPHEN 1 TABLET(S): 5; 325 TABLET ORAL at 06:49

## 2022-01-01 RX ADMIN — Medication 30 MILLIGRAM(S): at 05:23

## 2022-01-01 RX ADMIN — Medication 2 MILLIGRAM(S): at 14:01

## 2022-01-01 RX ADMIN — SODIUM CHLORIDE 200 MILLILITER(S): 9 INJECTION, SOLUTION INTRAVENOUS at 12:51

## 2022-01-01 RX ADMIN — Medication 150 MICROGRAM(S): at 05:48

## 2022-01-01 RX ADMIN — OXYCODONE AND ACETAMINOPHEN 1 TABLET(S): 5; 325 TABLET ORAL at 14:48

## 2022-01-01 RX ADMIN — ONDANSETRON 4 MILLIGRAM(S): 8 TABLET, FILM COATED ORAL at 12:50

## 2022-01-01 RX ADMIN — Medication 100 MILLIGRAM(S): at 13:51

## 2022-01-01 RX ADMIN — Medication 200 MILLIGRAM(S): at 06:15

## 2022-01-01 RX ADMIN — OXYCODONE AND ACETAMINOPHEN 1 TABLET(S): 5; 325 TABLET ORAL at 07:02

## 2022-01-01 RX ADMIN — HEPARIN SODIUM 5000 UNIT(S): 5000 INJECTION INTRAVENOUS; SUBCUTANEOUS at 14:17

## 2022-01-01 RX ADMIN — DULOXETINE HYDROCHLORIDE 60 MILLIGRAM(S): 30 CAPSULE, DELAYED RELEASE ORAL at 14:17

## 2022-01-01 RX ADMIN — Medication 20 MILLIGRAM(S): at 14:48

## 2022-01-01 RX ADMIN — Medication 2 MILLIGRAM(S): at 10:01

## 2022-01-01 RX ADMIN — Medication 150 MICROGRAM(S): at 05:15

## 2022-01-01 RX ADMIN — DULOXETINE HYDROCHLORIDE 60 MILLIGRAM(S): 30 CAPSULE, DELAYED RELEASE ORAL at 11:38

## 2022-01-01 RX ADMIN — OXYCODONE AND ACETAMINOPHEN 1 TABLET(S): 5; 325 TABLET ORAL at 23:12

## 2022-01-01 RX ADMIN — Medication 2 MILLIGRAM(S): at 12:00

## 2022-01-01 RX ADMIN — Medication 2 MILLIGRAM(S): at 22:16

## 2022-01-01 RX ADMIN — Medication 2 MILLIGRAM(S): at 14:17

## 2022-01-01 RX ADMIN — Medication 30 MILLILITER(S): at 02:33

## 2022-01-01 RX ADMIN — OXYCODONE AND ACETAMINOPHEN 1 TABLET(S): 5; 325 TABLET ORAL at 20:52

## 2022-01-01 RX ADMIN — DULOXETINE HYDROCHLORIDE 60 MILLIGRAM(S): 30 CAPSULE, DELAYED RELEASE ORAL at 12:29

## 2022-01-01 RX ADMIN — Medication 2 MILLIGRAM(S): at 09:12

## 2022-01-01 RX ADMIN — DULOXETINE HYDROCHLORIDE 60 MILLIGRAM(S): 30 CAPSULE, DELAYED RELEASE ORAL at 11:26

## 2022-01-01 RX ADMIN — OXYCODONE AND ACETAMINOPHEN 1 TABLET(S): 5; 325 TABLET ORAL at 16:32

## 2022-01-01 RX ADMIN — Medication 200 MILLIGRAM(S): at 17:40

## 2022-01-01 RX ADMIN — MORPHINE SULFATE 2 MILLIGRAM(S): 50 CAPSULE, EXTENDED RELEASE ORAL at 18:25

## 2022-01-01 RX ADMIN — Medication 200 MILLIGRAM(S): at 17:32

## 2022-01-01 RX ADMIN — OXYCODONE AND ACETAMINOPHEN 1 TABLET(S): 5; 325 TABLET ORAL at 14:49

## 2022-01-01 RX ADMIN — Medication 2 MILLIGRAM(S): at 06:00

## 2022-01-01 RX ADMIN — Medication 60 MILLIGRAM(S): at 05:48

## 2022-01-01 RX ADMIN — OXYCODONE AND ACETAMINOPHEN 1 TABLET(S): 5; 325 TABLET ORAL at 06:33

## 2022-01-01 RX ADMIN — OXYCODONE AND ACETAMINOPHEN 1 TABLET(S): 5; 325 TABLET ORAL at 08:30

## 2022-01-01 RX ADMIN — Medication 200 MILLIGRAM(S): at 18:24

## 2022-01-01 RX ADMIN — Medication 200 MILLIGRAM(S): at 05:48

## 2022-01-01 RX ADMIN — Medication 2 MILLIGRAM(S): at 23:58

## 2022-01-01 RX ADMIN — VASOPRESSIN 2.4 UNIT(S)/MIN: 20 INJECTION INTRAVENOUS at 12:56

## 2022-01-01 RX ADMIN — Medication 200 MILLIGRAM(S): at 05:36

## 2022-01-01 RX ADMIN — SODIUM CHLORIDE 1000 MILLILITER(S): 9 INJECTION, SOLUTION INTRAVENOUS at 00:37

## 2022-01-01 RX ADMIN — Medication 200 MILLIGRAM(S): at 18:00

## 2022-01-01 RX ADMIN — SODIUM CHLORIDE 100 MILLILITER(S): 9 INJECTION INTRAMUSCULAR; INTRAVENOUS; SUBCUTANEOUS at 20:44

## 2022-01-01 RX ADMIN — DULOXETINE HYDROCHLORIDE 60 MILLIGRAM(S): 30 CAPSULE, DELAYED RELEASE ORAL at 12:17

## 2022-01-01 RX ADMIN — Medication 650 MILLIGRAM(S): at 13:42

## 2022-01-01 RX ADMIN — Medication 300 MILLIGRAM(S): at 14:17

## 2022-01-01 RX ADMIN — Medication 650 MILLIGRAM(S): at 14:01

## 2022-01-01 RX ADMIN — DULOXETINE HYDROCHLORIDE 60 MILLIGRAM(S): 30 CAPSULE, DELAYED RELEASE ORAL at 12:14

## 2022-01-01 RX ADMIN — Medication 200 MILLIGRAM(S): at 17:22

## 2022-01-01 RX ADMIN — NYSTATIN CREAM 1 APPLICATION(S): 100000 CREAM TOPICAL at 06:17

## 2022-01-01 RX ADMIN — Medication 2 MILLIGRAM(S): at 17:44

## 2022-01-01 RX ADMIN — FOSAPREPITANT DIMEGLUMINE 300 MILLIGRAM(S): 150 INJECTION, POWDER, LYOPHILIZED, FOR SOLUTION INTRAVENOUS at 15:32

## 2022-01-01 RX ADMIN — Medication 325 MILLIGRAM(S): at 11:12

## 2022-01-01 RX ADMIN — DIATRIZOATE MEGLUMINE 30 MILLILITER(S): 180 INJECTION, SOLUTION INTRAVESICAL at 11:38

## 2022-01-01 RX ADMIN — OXYCODONE AND ACETAMINOPHEN 1 TABLET(S): 5; 325 TABLET ORAL at 22:34

## 2022-01-01 RX ADMIN — Medication 300 MILLIGRAM(S): at 18:36

## 2022-01-01 RX ADMIN — OXYCODONE AND ACETAMINOPHEN 1 TABLET(S): 5; 325 TABLET ORAL at 18:35

## 2022-01-01 RX ADMIN — LISINOPRIL 40 MILLIGRAM(S): 2.5 TABLET ORAL at 05:56

## 2022-01-01 RX ADMIN — PAMIDRONATE DISODIUM 65 MILLIGRAM(S): 9 INJECTION, SOLUTION INTRAVENOUS at 23:00

## 2022-01-01 RX ADMIN — Medication 2 MILLIGRAM(S): at 18:43

## 2022-01-01 RX ADMIN — LISINOPRIL 40 MILLIGRAM(S): 2.5 TABLET ORAL at 05:36

## 2022-01-01 RX ADMIN — Medication 300 MILLIGRAM(S): at 13:13

## 2022-01-01 RX ADMIN — HEPARIN SODIUM 5000 UNIT(S): 5000 INJECTION INTRAVENOUS; SUBCUTANEOUS at 21:40

## 2022-01-01 RX ADMIN — Medication 750 MILLIGRAM(S): at 22:48

## 2022-01-01 RX ADMIN — Medication 150 MICROGRAM(S): at 05:18

## 2022-01-01 RX ADMIN — Medication 650 MILLIGRAM(S): at 00:20

## 2022-01-01 RX ADMIN — HEPARIN SODIUM 5000 UNIT(S): 5000 INJECTION INTRAVENOUS; SUBCUTANEOUS at 13:51

## 2022-01-01 RX ADMIN — Medication 200 MILLIGRAM(S): at 06:01

## 2022-01-01 RX ADMIN — CHLORHEXIDINE GLUCONATE 1 APPLICATION(S): 213 SOLUTION TOPICAL at 05:22

## 2022-01-01 RX ADMIN — Medication 150 MICROGRAM(S): at 05:23

## 2022-01-01 RX ADMIN — Medication 30 MILLILITER(S): at 17:41

## 2022-01-01 RX ADMIN — NYSTATIN CREAM 1 APPLICATION(S): 100000 CREAM TOPICAL at 17:51

## 2022-01-01 RX ADMIN — Medication 150 MICROGRAM(S): at 06:15

## 2022-01-01 RX ADMIN — Medication 10 MILLIGRAM(S): at 20:09

## 2022-01-01 RX ADMIN — Medication 200 MILLIGRAM(S): at 06:10

## 2022-01-01 RX ADMIN — Medication 2 MILLIGRAM(S): at 21:14

## 2022-01-01 RX ADMIN — HEPARIN SODIUM 5000 UNIT(S): 5000 INJECTION INTRAVENOUS; SUBCUTANEOUS at 13:14

## 2022-01-01 RX ADMIN — IRON SUCROSE 110 MILLIGRAM(S): 20 INJECTION, SOLUTION INTRAVENOUS at 12:54

## 2022-01-01 RX ADMIN — OXYCODONE AND ACETAMINOPHEN 1 TABLET(S): 5; 325 TABLET ORAL at 10:25

## 2022-01-01 RX ADMIN — DULOXETINE HYDROCHLORIDE 60 MILLIGRAM(S): 30 CAPSULE, DELAYED RELEASE ORAL at 12:53

## 2022-01-01 RX ADMIN — Medication 30 MILLIGRAM(S): at 05:15

## 2022-01-01 RX ADMIN — LISINOPRIL 40 MILLIGRAM(S): 2.5 TABLET ORAL at 05:20

## 2022-01-01 RX ADMIN — Medication 300 MILLIGRAM(S): at 05:18

## 2022-01-01 RX ADMIN — OXYCODONE AND ACETAMINOPHEN 1 TABLET(S): 5; 325 TABLET ORAL at 10:21

## 2022-01-01 RX ADMIN — Medication 30 MILLIGRAM(S): at 05:22

## 2022-01-01 RX ADMIN — Medication 1 PACKET(S): at 17:40

## 2022-01-01 RX ADMIN — TRAMADOL HYDROCHLORIDE 25 MILLIGRAM(S): 50 TABLET ORAL at 11:26

## 2022-01-01 RX ADMIN — SODIUM CHLORIDE 100 MILLILITER(S): 9 INJECTION INTRAMUSCULAR; INTRAVENOUS; SUBCUTANEOUS at 23:16

## 2022-01-01 RX ADMIN — ONDANSETRON 8 MILLIGRAM(S): 8 TABLET, FILM COATED ORAL at 08:07

## 2022-01-01 RX ADMIN — Medication 650 MILLIGRAM(S): at 18:42

## 2022-01-01 RX ADMIN — Medication 650 MILLIGRAM(S): at 21:36

## 2022-01-01 RX ADMIN — Medication 150 MICROGRAM(S): at 06:48

## 2022-01-01 RX ADMIN — NYSTATIN CREAM 1 APPLICATION(S): 100000 CREAM TOPICAL at 05:47

## 2022-01-01 RX ADMIN — Medication 300 MILLIGRAM(S): at 17:32

## 2022-01-01 RX ADMIN — ONDANSETRON 8 MILLIGRAM(S): 8 TABLET, FILM COATED ORAL at 12:57

## 2022-01-01 RX ADMIN — ENOXAPARIN SODIUM 40 MILLIGRAM(S): 100 INJECTION SUBCUTANEOUS at 13:41

## 2022-01-01 RX ADMIN — Medication 1 PACKET(S): at 11:37

## 2022-01-01 RX ADMIN — Medication 81 MILLIGRAM(S): at 12:29

## 2022-01-01 RX ADMIN — HEPARIN SODIUM 5000 UNIT(S): 5000 INJECTION INTRAVENOUS; SUBCUTANEOUS at 16:33

## 2022-01-01 RX ADMIN — Medication 5 MILLIGRAM(S): at 21:54

## 2022-01-01 RX ADMIN — OXYCODONE AND ACETAMINOPHEN 1 TABLET(S): 5; 325 TABLET ORAL at 00:01

## 2022-01-01 RX ADMIN — Medication 300 MILLIGRAM(S): at 18:41

## 2022-01-01 RX ADMIN — Medication 650 MILLIGRAM(S): at 21:16

## 2022-01-01 RX ADMIN — NYSTATIN CREAM 1 APPLICATION(S): 100000 CREAM TOPICAL at 18:37

## 2022-01-01 RX ADMIN — SIMETHICONE 80 MILLIGRAM(S): 80 TABLET, CHEWABLE ORAL at 11:59

## 2022-01-01 RX ADMIN — Medication 325 MILLIGRAM(S): at 12:29

## 2022-01-01 RX ADMIN — Medication 200 MILLIGRAM(S): at 05:21

## 2022-01-01 RX ADMIN — DULOXETINE HYDROCHLORIDE 60 MILLIGRAM(S): 30 CAPSULE, DELAYED RELEASE ORAL at 13:41

## 2022-01-01 RX ADMIN — Medication 62.5 MILLIMOLE(S): at 04:18

## 2022-01-01 RX ADMIN — SODIUM CHLORIDE 100 MILLILITER(S): 9 INJECTION INTRAMUSCULAR; INTRAVENOUS; SUBCUTANEOUS at 06:11

## 2022-01-01 RX ADMIN — Medication 20 MILLIEQUIVALENT(S): at 11:59

## 2022-01-01 RX ADMIN — SODIUM CHLORIDE 100 MILLILITER(S): 9 INJECTION INTRAMUSCULAR; INTRAVENOUS; SUBCUTANEOUS at 13:23

## 2022-01-01 RX ADMIN — OXYCODONE AND ACETAMINOPHEN 1 TABLET(S): 5; 325 TABLET ORAL at 22:18

## 2022-01-01 RX ADMIN — HEPARIN SODIUM 5000 UNIT(S): 5000 INJECTION INTRAVENOUS; SUBCUTANEOUS at 23:32

## 2022-01-01 RX ADMIN — OXYCODONE AND ACETAMINOPHEN 2 TABLET(S): 5; 325 TABLET ORAL at 17:42

## 2022-01-01 RX ADMIN — DULOXETINE HYDROCHLORIDE 60 MILLIGRAM(S): 30 CAPSULE, DELAYED RELEASE ORAL at 12:38

## 2022-01-01 RX ADMIN — HEPARIN SODIUM 5000 UNIT(S): 5000 INJECTION INTRAVENOUS; SUBCUTANEOUS at 06:03

## 2022-01-01 RX ADMIN — FENTANYL CITRATE 50 MICROGRAM(S): 50 INJECTION INTRAVENOUS at 15:58

## 2022-01-01 RX ADMIN — Medication 2 MILLIGRAM(S): at 17:56

## 2022-01-01 RX ADMIN — Medication 30 MILLIGRAM(S): at 12:21

## 2022-01-01 RX ADMIN — Medication 650 MILLIGRAM(S): at 05:43

## 2022-01-01 RX ADMIN — OXYCODONE AND ACETAMINOPHEN 1 TABLET(S): 5; 325 TABLET ORAL at 11:44

## 2022-01-01 RX ADMIN — Medication 20 MILLIGRAM(S): at 06:18

## 2022-01-01 RX ADMIN — MORPHINE SULFATE 4 MILLIGRAM(S): 50 CAPSULE, EXTENDED RELEASE ORAL at 20:07

## 2022-01-01 RX ADMIN — Medication 85 MILLIMOLE(S): at 16:43

## 2022-01-01 RX ADMIN — HEPARIN SODIUM 5000 UNIT(S): 5000 INJECTION INTRAVENOUS; SUBCUTANEOUS at 22:36

## 2022-01-01 RX ADMIN — Medication 20 MILLIGRAM(S): at 05:15

## 2022-01-01 RX ADMIN — OXYCODONE AND ACETAMINOPHEN 1 TABLET(S): 5; 325 TABLET ORAL at 19:08

## 2022-01-01 RX ADMIN — OXYCODONE AND ACETAMINOPHEN 1 TABLET(S): 5; 325 TABLET ORAL at 09:51

## 2022-01-01 RX ADMIN — OXYCODONE AND ACETAMINOPHEN 1 TABLET(S): 5; 325 TABLET ORAL at 07:35

## 2022-01-01 RX ADMIN — MAGNESIUM OXIDE 400 MG ORAL TABLET 400 MILLIGRAM(S): 241.3 TABLET ORAL at 00:36

## 2022-01-01 RX ADMIN — Medication 20 MILLIEQUIVALENT(S): at 13:42

## 2022-01-01 RX ADMIN — HEPARIN SODIUM 5000 UNIT(S): 5000 INJECTION INTRAVENOUS; SUBCUTANEOUS at 21:55

## 2022-01-01 RX ADMIN — CHLORHEXIDINE GLUCONATE 1 APPLICATION(S): 213 SOLUTION TOPICAL at 05:34

## 2022-01-01 RX ADMIN — SODIUM CHLORIDE 100 MILLILITER(S): 9 INJECTION INTRAMUSCULAR; INTRAVENOUS; SUBCUTANEOUS at 05:53

## 2022-01-01 RX ADMIN — Medication 2 MILLIGRAM(S): at 06:10

## 2022-01-01 RX ADMIN — Medication 100 MILLIGRAM(S): at 06:04

## 2022-01-01 RX ADMIN — PHENYLEPHRINE HYDROCHLORIDE 18.5 MICROGRAM(S)/KG/MIN: 10 INJECTION INTRAVENOUS at 18:01

## 2022-01-01 RX ADMIN — Medication 200 MILLIGRAM(S): at 21:52

## 2022-01-01 RX ADMIN — OXYCODONE AND ACETAMINOPHEN 1 TABLET(S): 5; 325 TABLET ORAL at 13:31

## 2022-01-01 RX ADMIN — Medication 30 MILLIGRAM(S): at 05:56

## 2022-01-01 RX ADMIN — SODIUM CHLORIDE 150 MILLILITER(S): 9 INJECTION INTRAMUSCULAR; INTRAVENOUS; SUBCUTANEOUS at 17:53

## 2022-01-01 RX ADMIN — MORPHINE SULFATE 2 MILLIGRAM(S): 50 CAPSULE, EXTENDED RELEASE ORAL at 15:43

## 2022-01-01 RX ADMIN — MIDAZOLAM HYDROCHLORIDE 2 MILLIGRAM(S): 1 INJECTION, SOLUTION INTRAMUSCULAR; INTRAVENOUS at 12:49

## 2022-01-01 RX ADMIN — Medication 325 MILLIGRAM(S): at 14:16

## 2022-01-01 RX ADMIN — Medication 106 MILLIGRAM(S): at 15:32

## 2022-01-01 RX ADMIN — VASOPRESSIN 2.4 UNIT(S)/MIN: 20 INJECTION INTRAVENOUS at 16:53

## 2022-01-01 RX ADMIN — ENOXAPARIN SODIUM 40 MILLIGRAM(S): 100 INJECTION SUBCUTANEOUS at 13:38

## 2022-01-01 RX ADMIN — OXYCODONE AND ACETAMINOPHEN 1 TABLET(S): 5; 325 TABLET ORAL at 06:21

## 2022-01-01 RX ADMIN — SODIUM CHLORIDE 100 MILLILITER(S): 9 INJECTION INTRAMUSCULAR; INTRAVENOUS; SUBCUTANEOUS at 18:16

## 2022-01-01 RX ADMIN — HEPARIN SODIUM 5000 UNIT(S): 5000 INJECTION INTRAVENOUS; SUBCUTANEOUS at 22:08

## 2022-01-01 RX ADMIN — Medication 30 MILLIGRAM(S): at 05:17

## 2022-01-01 RX ADMIN — Medication 650 MILLIGRAM(S): at 06:00

## 2022-01-01 RX ADMIN — EPINEPHRINE 185 MICROGRAM(S)/KG/MIN: 0.3 INJECTION INTRAMUSCULAR; SUBCUTANEOUS at 19:02

## 2022-01-01 RX ADMIN — Medication 2 MILLIGRAM(S): at 22:34

## 2022-01-01 RX ADMIN — OXYCODONE AND ACETAMINOPHEN 1 TABLET(S): 5; 325 TABLET ORAL at 21:25

## 2022-01-01 RX ADMIN — OXYCODONE AND ACETAMINOPHEN 2 TABLET(S): 5; 325 TABLET ORAL at 05:45

## 2022-01-01 RX ADMIN — SODIUM CHLORIDE 75 MILLILITER(S): 9 INJECTION INTRAMUSCULAR; INTRAVENOUS; SUBCUTANEOUS at 16:44

## 2022-07-14 NOTE — H&P ADULT - HISTORY OF PRESENT ILLNESS
Patient is a 58 y/o F with a pmhx of lupus, hypothyroidism, antiphospholipid syndrome, HTN, anemia who reports to the ER complaining diffuse pelvic pain and back pain x 3 weeks associated with decreased appetite and vomiting over the last 8 days. Patient describes the pain as "achy" that comes and goes, starting 3 weeks ago rating it a 10/10. Patient denies fever, chills, chest pain, shortness of breath, blood in the vomit or stool, unintentional weight loss.  Patient is a 56 y/o F with a pmhx of lupus, hypothyroidism, antiphospholipid syndrome, HTN, anemia who reports to the ER complaining diffuse pelvic pain and back pain x 3 weeks associated with decreased appetite and vomiting over the last 8 days. Patient describes the pain as "achy" that comes and goes, starting 3 weeks ago rating it a 10/10. In the ED, CT A/P showed bilateral adnexal hypodensities, para-aortic lymphadenopathy as well as bilateral pulm nodules, findings are suspicious for underlying metastatic ovarian   malignancy/lymphoma.. Patient denies fever, chills, chest pain, shortness of breath, blood in the vomit or stool, unintentional weight loss.

## 2022-07-14 NOTE — ED PROVIDER NOTE - PHYSICAL EXAMINATION
VITAL SIGNS: I have reviewed nursing notes and confirm.  CONSTITUTIONAL: uncomfortable, nauseous   SKIN: skin exam is warm and dry, no acute rash.    HEAD: Normocephalic; atraumatic.  EYES: conjunctiva and sclera clear.  ENT: No nasal discharge; airway clear.  NECK: Supple; non tender.  CARD: S1, S2 normal; no murmurs, gallops, or rubs. Regular rate and rhythm.   RESP: No wheezes, rales or rhonchi.  ABD: Normal bowel sounds; soft; non-distended; non-tender  EXT: Normal ROM.  No clubbing, cyanosis or edema.   LYMPH: No acute cervical adenopathy.  NEURO: Alert, oriented, grossly unremarkable  PSYCH: Cooperative, appropriate.

## 2022-07-14 NOTE — ED PROVIDER NOTE - CARE PLAN
1 Principal Discharge DX:	Suspected malignant neoplasm of ovary  Secondary Diagnosis:	Hypocalcemia  Secondary Diagnosis:	Nausea with vomiting  Secondary Diagnosis:	Leukocytosis   Principal Discharge DX:	Suspected malignant neoplasm of ovary  Secondary Diagnosis:	Hypocalcemia  Secondary Diagnosis:	Nausea with vomiting  Secondary Diagnosis:	Leukocytosis  Secondary Diagnosis:	Hypercalcemia

## 2022-07-14 NOTE — H&P ADULT - NSHPPHYSICALEXAM_GEN_ALL_CORE
GENERAL: NAD, lying in bed comfortably  HEAD:  Atraumatic, Normocephalic  EYES: EOMI, PERRLA, conjunctiva and sclera clear  ENT: Moist mucous membranes  NECK: Supple, No JVD  CHEST/LUNG: Clear to auscultation bilaterally; No rales, rhonchi, wheezing, or rubs. Unlabored respirations  HEART: Regular rate and rhythm; No murmurs, rubs, or gallops  ABDOMEN: Bowel sounds present; Soft, Nondistended. No hepatomegally. (+) Mild suprapubic tenderness upon deep palpation.  EXTREMITIES:  2+ Peripheral Pulses, brisk capillary refill. No clubbing, cyanosis, or edema  NERVOUS SYSTEM:  Alert & Oriented X3, speech clear. No deficits   MSK: FROM all 4 extremities, full and equal strength  SKIN: No rashes or lesions

## 2022-07-14 NOTE — ED PROVIDER NOTE - NS ED ATTENDING STATEMENT MOD
This was a shared visit with the MILAN. I reviewed and verified the documentation and independently performed the documented:

## 2022-07-14 NOTE — ED PROVIDER NOTE - NS ED ROS FT
Eyes:  No visual changes, eye pain or discharge.  ENMT:  No hearing changes, pain, discharge or infections. No neck pain or stiffness.  Cardiac:  No chest pain, SOB or edema. No chest pain with exertion.  Respiratory:  No cough or respiratory distress. No hemoptysis. No history of asthma or RAD.  GI: + abd pain +n/v No  diarrhea   :  No dysuria, frequency or burning.  MS:  No myalgia, muscle weakness, joint pain or back pain.  Neuro:  No headache or weakness.  No LOC.  Skin:  No skin rash.   Endocrine: No history of thyroid disease or diabetes.  Except as documented in the HPI,  all other systems are negative.

## 2022-07-14 NOTE — ED PROVIDER NOTE - PROGRESS NOTE DETAILS
spoke with Dr. Balderas who recommends admission [facility] for [facility] onc and GYN service, message left for Dr. Serrano Pt approved for [place] admission, will admit .Patient made aware of CT results, will admit [facility] for further work-up.  Patient with leukocytosis and lactic acidosis we will treat with antibiotics.  Patient receiving fluid hydration hypercalcemia

## 2022-07-14 NOTE — H&P ADULT - NSHPLABSRESULTS_GEN_ALL_CORE
LABS:             9.6    39.35 )-----------( 803      ( 2022 17:12 )             30.1     -    137  |  96<L>  |  22<H>  ----------------------------<  76  4.2   |  18  |  1.1    Ca    13.3<H>      2022 17:12    TPro  7.0  /  Alb  4.0  /  TBili  0.4  /  DBili  <0.2  /  AST  45<H>  /  ALT  28  /  AlkPhos  177<H>      LIVER FUNCTIONS - ( 2022 17:12 )  Alb: 4.0 g/dL / Pro: 7.0 g/dL / ALK PHOS: 177 U/L / ALT: 28 U/L / AST: 45 U/L / GGT: x             Urinalysis Basic - ( 2022 22:50 )    Color: Yellow / Appearance: Slightly Cloudy / S.025 / pH: x  Gluc: x / Ketone: 40  / Bili: Moderate / Urobili: 0.2 mg/dL   Blood: x / Protein: 30 mg/dL / Nitrite: Negative   Leuk Esterase: Trace / RBC: x / WBC x   Sq Epi: x / Non Sq Epi: x / Bacteria: x  ++++++++++++++++++++++++++++++++++++++++++++++++++++++++++++++++  < from: CT Abdomen and Pelvis w/ IV Cont (22 @ 18:24) >  IMPRESSION:    Limited examination as described above.    Confluent hypodense lesions are visualized possibly involving the   bilateral adnexa (601/70-50), measuring up to 6.8 x 4.5 cm. Recommend   follow-up pelvic ultrasound.    Diffuse rounded para-aortic lymphadenopathy, measuring upto 1.7 cm in   short axis, compatible with neoplastic process versus less likely   infectious/inflammatory process. Recommend PET/CT and/or tissue sampling.    Multiple scattered bilateral solid pulmonary nodules, the largest of   which measures 8 mmin the right middle lobe.    Overall findings are suspicious for underlying metastatic ovarian   malignancy/lymphoma.    --- End of Report --  LOWELL KRAMER MD; Resident Radiologist  This document has been electronically signed.  SAEID RETANA MD; Attending Radiologist  This document has been electronically signed. 2022  7:29PM    < end of copied text >  +++++++++++++++++++++++++++++++++++++++++++++++++++++++++++++++++

## 2022-07-14 NOTE — ED PROVIDER NOTE - CLINICAL SUMMARY MEDICAL DECISION MAKING FREE TEXT BOX
57y female with worsening abd pain, PE as above, labs and studies reviewed, concern for malignancy, will admit [facility] for further eval, IVF

## 2022-07-14 NOTE — H&P ADULT - ASSESSMENT
Assessment:  Patient is a 56 y/o F with a pmhx of lupus, hypothyroidism, antiphospholipid syndrome, HTN, anemia who reports to the ER complaining diffuse pelvic pain and back pain x 3 weeks associated with decreased appetite and vomiting over the last 8 days.    Plan:  #Metastatic ovarian cancer/lymphoma  #Lactic acidosis  #Leukocytosis  #Hypercalcemia  Patient to be transferred to [facility] for further hem/onc and GYN work up.   Monitor labs and vital signs.  Zofran PRN for nausea.  Patient started on IV fluids in the ER.   Continue with IV fluids  Patient started on cipro and flagyl in the ER.  Will continue.    #HTN  Continue with BP medication.  Monitor BP  Low sodium diet.    #Lupus  Continue with hydroxychloroquine    #Hypothyroidism  Continue with synthroid    VTE ppx: Heparin  Above discussed with Dr. Balderas Assessment:  Patient is a 56 y/o F with a pmhx of lupus, hypothyroidism, antiphospholipid syndrome, HTN, anemia who reports to the ER complaining diffuse pelvic pain and back pain x 3 weeks associated with decreased appetite and vomiting over the last 8 days.    Plan:  #Metastatic ovarian cancer/lymphoma  #Lactic acidosis  #Leukocytosis  #Hypercalcemia  Patient to be transferred to [facility] for further hem/onc and GYN work up.   Oncology consult placed and gyn onc  ID consult placed r/o infectious etiology.   Monitor labs and vital signs.  Zofran PRN for nausea.  Patient started on IV fluids in the ER.   Continue with IV fluids  Patient started on cipro and flagyl in the ER.  Will continue.    #HTN  Continue with BP medication.  Monitor BP  Low sodium diet.    #Lupus  Continue with hydroxychloroquine    #Hypothyroidism  Continue with synthroid    VTE ppx: Heparin  Above discussed with Dr. Balderas Assessment:  Patient is a 58 y/o F with a pmhx of lupus, hypothyroidism, antiphospholipid syndrome, HTN, anemia who reports to the ER complaining diffuse pelvic pain and back pain x 3 weeks associated with decreased appetite and vomiting over the last 8 days.    Plan:  #Metastatic ovarian cancer/lymphoma  #Lactic acidosis  #Leukocytosis  #Hypercalcemia  #UA trace leuk esterase   Patient to be transferred to [facility] for further hem/onc and GYN work up.   Oncology consult placed and gyn onc  ID consult placed r/o infectious etiology.   Follow up procalcitonin.  Follow up repeat lactic acid.  SIRS criteria: 1  Patient started on IV fluids in the ER.   Continue with IV fluids  Monitor labs and vital signs.  Zofran PRN for nausea.  Patient started on cipro and flagyl in the ER.  Will continue.    #HTN  Continue with BP medication.  Monitor BP  Low sodium diet.    #Lupus  Continue with hydroxychloroquine    #Hypothyroidism  Continue with synthroid    VTE ppx: Heparin  Above discussed with Dr. Balderas Assessment:  Patient is a 58 y/o F with a pmhx of lupus, hypothyroidism, antiphospholipid syndrome, HTN, anemia who reports to the ER complaining diffuse pelvic pain and back pain x 3 weeks associated with decreased appetite and vomiting over the last 8 days.    Plan:  #Metastatic ovarian cancer/lymphoma  #Leukocytosis / SIRS - possibly due to above, r/o underlying infection   #Lactic acidosis  #Hypercalcemia  #UA trace leuk esterase   Patient to be transferred to [facility] for further hem/onc and GYN work up.   Pt endorsed to Dr. Barrera who is aware of transfer  Oncology consult placed and gyn onc  ID consult placed r/o infectious etiology.   Follow up procalcitonin.  Follow up repeat lactic acid.  SIRS criteria: 1  Patient started on IV fluids in the ER.   Continue with IV fluids  Monitor labs and vital signs.  Zofran PRN for nausea.  Patient started on cipro and flagyl in the ER.  Will continue.    #microcytic anemia   check iron studies  f/u occult blood   monitor Hb    #HTN  Continue with BP medication.  Monitor BP  Low sodium diet.    #Lupus  Continue with hydroxychloroquine    #Hypothyroidism  Continue with synthroid    VTE ppx: Heparin  Above discussed with Dr. Balderas Assessment:  Patient is a 58 y/o F with a pmhx of lupus, hypothyroidism, antiphospholipid syndrome, HTN, anemia who reports to the ER complaining diffuse pelvic pain and back pain x 3 weeks associated with decreased appetite and vomiting over the last 8 days.    Plan:  #Metastatic ovarian cancer/lymphoma  #Leukocytosis / SIRS - possibly due to above, r/o underlying infection   #Lactic acidosis  #Hypercalcemia  #UA trace leuk esterase   Patient to be transferred to [facility] for further hem/onc and GYN work up.   Pt endorsed to Dr. Barrera who is aware of transfer  Oncology consult placed and gyn onc  ID consult placed r/o infectious etiology.   Follow up procalcitonin.  Follow up repeat lactic acid.  SIRS criteria: 1  Patient started on IV fluids in the ER.   Continue with IV fluids  Monitor labs and vital signs.  Zofran PRN for nausea.  Patient started on cipro and flagyl in the ER.  Will continue.    #hypercalcemia - pt is asymptomatic  check ionized ca  NS@100ml/hr  trend Calcium    #microcytic anemia   check iron studies  f/u occult blood   monitor Hb    #HTN  Continue with BP medication.  Monitor BP  Low sodium diet.    #Lupus  Continue with hydroxychloroquine    #Hypothyroidism  Continue with synthroid    VTE ppx: Heparin  Above discussed with Dr. Balderas

## 2022-07-14 NOTE — ED PROVIDER NOTE - OBJECTIVE STATEMENT
Is a 57-year-old female with a past medical history of lupus, fibromyalgia, hypertension, anemia here for evaluation of 1 month of diffuse abdominal discomfort with associated nausea vomiting this been progressively worsening over the past 1 week.  Patient denies diarrhea, fever, chills, chest pain, shortness of

## 2022-07-15 NOTE — PROGRESS NOTE ADULT - SUBJECTIVE AND OBJECTIVE BOX
HPI  Patient is a 57y old Female who presents with a chief complaint of Abd/pelvic pain (15 Jul 2022 17:28)    Currently admitted to medicine with the primary diagnosis of Suspected malignant neoplasm of ovary       Today is hospital day 1d.     INTERVAL HPI / OVERNIGHT EVENTS:  Patient was seen and examined at bedside  Patient Feels better  able to tolerate diet  abdominal pain controlled  last bowel movement yesterday  no urinay complaints  no h/o DVT  still has menstrual cycle- unchanged form age of 35 years   has anemia- no other bleeding noticed        PAST MEDICAL & SURGICAL HISTORY  Lupus    Hypothyroidism    Iron deficiency anemia    No significant past surgical history      ALLERGIES  NSAIDs (Stomach Upset)    MEDICATIONS  STANDING MEDICATIONS  chlorhexidine 4% Liquid 1 Application(s) Topical <User Schedule>  ciprofloxacin   IVPB 400 milliGRAM(s) IV Intermittent every 12 hours  DULoxetine 60 milliGRAM(s) Oral daily  heparin   Injectable 5000 Unit(s) SubCutaneous every 8 hours  hydroxychloroquine 200 milliGRAM(s) Oral two times a day  levothyroxine 150 MICROGram(s) Oral daily  lisinopril 40 milliGRAM(s) Oral daily  melatonin 5 milliGRAM(s) Oral at bedtime  metroNIDAZOLE  IVPB 500 milliGRAM(s) IV Intermittent every 8 hours  sodium chloride 0.9%. 1000 milliLiter(s) IV Continuous <Continuous>    PRN MEDICATIONS  ondansetron Injectable 4 milliGRAM(s) IV Push every 8 hours PRN  traMADol 25 milliGRAM(s) Oral two times a day PRN    VITALS:  T(F): 97.5  HR: 101  BP: 188/85  RR: 18  SpO2: 99%    PHYSICAL EXAM  GEN: no distress, comfortable  PULM: BS heard b/l equal, No wheezing  CVS: S1S2 present, no rubs or gallops  ABD: Soft, left quadrant firmness to palpation; dull to percuss  EXT: No lower extremity edema  NEURO: A&Ox3, moving all extremities    LABS                        9.6    39.35 )-----------( 803      ( 2022 17:12 )             30.1     07-14    137  |  96<L>  |  22<H>  ----------------------------<  76  4.2   |  18  |  1.1    Ca    13.3<H>      2022 17:12    TPro  7.0  /  Alb  4.0  /  TBili  0.4  /  DBili  <0.2  /  AST  45<H>  /  ALT  28  /  AlkPhos  177<H>  07      Urinalysis Basic - ( 2022 22:50 )    Color: Yellow / Appearance: Slightly Cloudy / S.025 / pH: x  Gluc: x / Ketone: 40  / Bili: Moderate / Urobili: 0.2 mg/dL   Blood: x / Protein: 30 mg/dL / Nitrite: Negative   Leuk Esterase: Trace / RBC: >50 /HPF / WBC 3-5 /HPF   Sq Epi: x / Non Sq Epi: Moderate /HPF / Bacteria: Few                RADIOLOGY

## 2022-07-15 NOTE — CONSULT NOTE ADULT - SUBJECTIVE AND OBJECTIVE BOX
Chief Complaint: nausea/vomiting, pelvic pain, back pain     Patient is a 56 y/o F with a pmhx of lupus, hypothyroidism, antiphospholipid syndrome, HTN, anemia who reports to the ER complaining diffuse pelvic pain and back pain x 3 weeks associated with decreased appetite and vomiting over the last 8 days. Patient describes the pain as "achy" that comes and goes, starting 3 weeks ago rating it a 10/10. In the ED, CT A/P showed bilateral adnexal hypodensities, para-aortic lymphadenopathy as well as bilateral pulm nodules, findings are suspicious for underlying metastatic ovarian   malignancy/lymphoma.. Patient denies fever, chills, chest pain, shortness of breath, blood in the vomit or stool, unintentional weight loss.       HPI: 56 yo, G0, LMP 7/10/22, presented to the ED complaining of nausea and vomiting, worsening in the last week. Reports she was unable to tolerate PO intake. Patient also complaining of new 7/10 pelvic and back pain for the past week. The pain is intermittent and aching in quality. Reports dysuria for the past week, denies hematuria, urinary urgency, urinary hesitancy. Denies vaginal discharge, irritation. Reports loose stools for the past 3 days. Denies fevers, chills, weight loss, night sweats, bloody stool, bloody vomitus. Denies chest pain, shortness of breath, dizziness/lightheadedness. Patient has not seen a GYN in over 10 years. She began menses at age 16. Her cycles are regular, occurring every 30 days, lasting 10 days with light flow. PMH is significant for obesity, lupus, antiphospholipid syndrome, hypothyroidism, hypertension, anemia, gastritis, fibromyalgia. She is an ex-smoker for the past 40 years.     Ob/Gyn History:  G0                 LMP - 7/10/22                  Cycle Length - 10 days  Denies history of ovarian cysts, uterine fibroids, abnormal paps, or STIs  Last Pap Smear - Last year, wnl.  Last Mammogram - Never done  Last Colonoscopy - Never done    Denies the following: constitutional symptoms, visual symptoms, cardiovascular symptoms, respiratory symptoms, GI symptoms, musculoskeletal symptoms, skin symptoms, neurologic symptoms, hematologic symptoms, allergic symptoms, psychiatric symptoms  Except any pertinent positives listed.     Home Medications:  DULoxetine 60 mg oral delayed release capsule: 1 cap(s) orally once a day (2022 22:46)  hydroxychloroquine 200 mg oral tablet: 1 tab(s) orally 2 times a day (2022 22:44)  lisinopril 40 mg oral tablet: 1 tab(s) orally once a day (2022 22:44)  Synthroid 150 mcg (0.15 mg) oral tablet: 1 tab(s) orally once a day (2022 22:46)      Allergies  NSAIDs (Stomach Upset)    PAST MEDICAL & SURGICAL HISTORY:  Lupus  Hypothyroidism  Iron deficiency anemia  No significant past surgical history    FAMILY HISTORY:  Denies family history of breast, ovarian, colon, uterine cancer. Mother had hysterectomy at 52 years of age due to "noncancerous uterine mass".     SOCIAL HISTORY: Denies cigarette use, alcohol use, or illicit drug use    Vital Signs Last 24 Hrs  T(F): 97.5 (15 Jul 2022 05:12), Max: 99.7 (15 Jul 2022 03:15)  HR: 101 (15 Jul 2022 13:58) (100 - 110)  BP: 188/85 (15 Jul 2022 13:58) (149/72 - 192/83)  RR: 18 (15 Jul 2022 05:12) (18 - 20)    General Appearance - AAOx3, NAD  Heart - S1S2 regular rate and rhythm  Lung - CTA Bilaterally  Abdomen - Large abdominal girth secondary to obesity, soft, nontender, no rebound, no rigidity, no guarding, bowel sounds present    GYN/Pelvis:  Erythema noted along skin folds distal from genitalia, likely secondary to friction  Labia Majora - Normal  Labia Minora - Normal  Clitoris - Normal  Urethra - Normal  Vagina - No discharge, no bleeding, no lacerations, no lesions  Cervix - 2mm cervical polyp palpated, not dilated, no bleeding, no lacerations, no lesions    Uterus:  Size - Hard to appreciate due to body habitus  Tenderness - None  Mass - None    Adnexa:  Masses - None  Tenderness - None    DOROTHEA: no masses palpated, no uterosacral nodularity, no blood     LABS:                        9.6    39.35 )-----------( 803      ( 2022 17:12 )             30.1     HCG Quantitative, Serum: <0.6 mIU/mL (07-15-22 @ 11:27)          137  |  96<L>  |  22<H>  ----------------------------<  76  4.2   |  18  |  1.1    Ca    13.3<H>      2022 17:12    TPro  7.0  /  Alb  4.0  /  TBili  0.4  /  DBili  <0.2  /  AST  45<H>  /  ALT  28  /  AlkPhos  177<H>  07-14      Urinalysis Basic - ( 2022 22:50 )    Color: Yellow / Appearance: Slightly Cloudy / S.025 / pH: x  Gluc: x / Ketone: 40  / Bili: Moderate / Urobili: 0.2 mg/dL   Blood: x / Protein: 30 mg/dL / Nitrite: Negative   Leuk Esterase: Trace / RBC: >50 /HPF / WBC 3-5 /HPF   Sq Epi: x / Non Sq Epi: Moderate /HPF / Bacteria: Few          RADIOLOGY & ADDITIONAL STUDIES:  ra< from: CT Abdomen and Pelvis w/ IV Cont (22 @ 18:24) >    ACC: [id number] EXAM:  CT ABDOMEN AND PELVIS IC                          PROCEDURE DATE:  2022          INTERPRETATION:  CLINICAL HISTORY / REASON FOR EXAM: Abdominal pain for   one month.    TECHNIQUE: Contiguous axial CT images were obtained from the lower chest   to the pubic symphysis following administration of 96 mL Omnipaque 350   intravenous contrast, 4 mL discarded. Oral contrast was not administered.   Reformatted images in the coronal and sagittal planes were acquired.    COMPARISON CT: None    FINDINGS:    Limited exam, patient is making contact with the gantry resulting in   significant artifact.    LOWER CHEST: Multiple scattered bilateral solid pulmonary nodules,   largest of which measures 8 mm in the right middle lobe (4/9).    HEPATOBILIARY: Unremarkable.    SPLEEN: Unremarkable.    PANCREAS: Unremarkable.    ADRENAL GLANDS: Unremarkable.    KIDNEYS: No hydronephrosis bilaterally.    ABDOMINOPELVIC NODES: There is diffuse rounded para-aortic   lymphadenopathy, measuring up to 1.7 cm in short axis.    PELVIC ORGANS: Confluent hypodense lesions are visualized possibly   involving the bilateral adnexa (601/70-50), measuring up to 6.8 x 4.5 cm,   however examination is limited as described above.    PERITONEUM/MESENTERY/BOWEL: No bowel obstruction or intraperitoneal free   air.`    BONES/SOFT TISSUES: Degenerative changes to the thoracolumbar spine. No   blastic or lytic lesions.    VASCULAR: Aorta is normal in caliber.      IMPRESSION:    Limited examination as described above.    Confluent hypodense lesions are visualized possibly involving the   bilateral adnexa (601/70-50), measuring up to 6.8 x 4.5 cm. Recommend   follow-up pelvic ultrasound.    Diffuse rounded para-aortic lymphadenopathy, measuring upto 1.7 cm in   short axis, compatible with neoplastic process versus less likely   infectious/inflammatory process. Recommend PET/CT and/or tissue sampling.    Multiple scattered bilateral solid pulmonary nodules, the largest of   which measures 8 mmin the right middle lobe.    Overall findings are suspicious for underlying metastatic ovarian   malignancy/lymphoma.    --- End of Report ---          LOWELL KRAMER MD; Resident Radiologist  This document has been electronically signed.  SAEID RETANA MD; Attending Radiologist  This document has been electronically signed. 2022  7:29PM    < end of copied text >     Chief Complaint: nausea/vomiting, pelvic pain, back pain         HPI: 58 yo, G0, LMP 7/10/22, presented to the ED complaining of nausea and vomiting, worsening in the last week. Reports she was unable to tolerate PO intake. Patient also complaining of new 7/10 pelvic and back pain for the past week. The pain is intermittent and aching in quality. Reports dysuria for the past week, denies hematuria, urinary urgency, urinary hesitancy. Denies vaginal discharge, irritation. Reports loose stools for the past 3 days. Denies fevers, chills, weight loss, night sweats, bloody stool, bloody vomitus. Denies chest pain, shortness of breath, dizziness/lightheadedness. Patient has not seen a GYN in over 10 years. She began menses at age 16. Her cycles are regular, occurring every 30 days, lasting 10 days with light flow. PMH is significant for obesity, lupus, antiphospholipid syndrome, hypothyroidism, hypertension, anemia, gastritis, fibromyalgia. She is an ex-smoker for the past 40 years.     Ob/Gyn History:  G0                 LMP - 7/10/22                  Cycle Length - q30d  Denies history of ovarian cysts, uterine fibroids, abnormal paps, or STIs  Last Pap Smear - 10 years ago, wnl.  Last Mammogram - Never done  Last Colonoscopy - Never done    Denies the following: constitutional symptoms, visual symptoms, cardiovascular symptoms, respiratory symptoms, GI symptoms, musculoskeletal symptoms, skin symptoms, neurologic symptoms, hematologic symptoms, allergic symptoms, psychiatric symptoms  Except any pertinent positives listed.     Home Medications:  DULoxetine 60 mg oral delayed release capsule: 1 cap(s) orally once a day (2022 22:46)  hydroxychloroquine 200 mg oral tablet: 1 tab(s) orally 2 times a day (2022 22:44)  lisinopril 40 mg oral tablet: 1 tab(s) orally once a day (2022 22:44)  Synthroid 150 mcg (0.15 mg) oral tablet: 1 tab(s) orally once a day (2022 22:46)      Allergies  NSAIDs (Stomach Upset)    PAST MEDICAL & SURGICAL HISTORY:  Lupus  Hypothyroidism  Iron deficiency anemia  No significant past surgical history    FAMILY HISTORY:  Denies family history of breast, ovarian, colon, uterine cancer. Mother had hysterectomy at 52 years of age due to "noncancerous uterine mass".     SOCIAL HISTORY: Denies cigarette use, alcohol use, or illicit drug use    Vital Signs Last 24 Hrs  T(F): 97.5 (15 Jul 2022 05:12), Max: 99.7 (15 Jul 2022 03:15)  HR: 101 (15 Jul 2022 13:58) (100 - 110)  BP: 188/85 (15 Jul 2022 13:58) (149/72 - 192/83)  RR: 18 (15 Jul 2022 05:12) (18 - 20)    General Appearance - AAOx3, NAD  Heart - S1S2 regular rate and rhythm  Lung - CTA Bilaterally  Abdomen - Large abdominal girth secondary to obesity, soft, nontender, no rebound, no rigidity, no guarding, bowel sounds present    GYN/Pelvis:  Erythema noted along skin folds distal from genitalia, likely secondary to friction  Labia Majora - Normal  Labia Minora - Normal  Clitoris - Normal  Urethra - Normal  Vagina - No discharge, no bleeding, no lacerations, no lesions  Cervix - 2mm cervical polyp palpated at 5:00, not dilated, no bleeding, no lacerations, no lesions  Rectovaginal exam - no pelvic masses palpated    Uterus:  Size - Hard to appreciate due to body habitus  Tenderness - None  Mass - None    Adnexa:  Masses - None  Tenderness - None    DOROTHEA: no masses palpated, no uterosacral nodularity, no blood     LABS:                        9.6    39.35 )-----------( 803      ( 2022 17:12 )             30.1     HCG Quantitative, Serum: <0.6 mIU/mL (07-15-22 @ 11:27)          137  |  96<L>  |  22<H>  ----------------------------<  76  4.2   |  18  |  1.1    Ca    13.3<H>      2022 17:12    TPro  7.0  /  Alb  4.0  /  TBili  0.4  /  DBili  <0.2  /  AST  45<H>  /  ALT  28  /  AlkPhos  177<H>        Urinalysis Basic - ( 2022 22:50 )    Color: Yellow / Appearance: Slightly Cloudy / S.025 / pH: x  Gluc: x / Ketone: 40  / Bili: Moderate / Urobili: 0.2 mg/dL   Blood: x / Protein: 30 mg/dL / Nitrite: Negative   Leuk Esterase: Trace / RBC: >50 /HPF / WBC 3-5 /HPF   Sq Epi: x / Non Sq Epi: Moderate /HPF / Bacteria: Few          RADIOLOGY & ADDITIONAL STUDIES:  ra< from: CT Abdomen and Pelvis w/ IV Cont (22 @ 18:24) >    ACC: [id number] EXAM:  CT ABDOMEN AND PELVIS IC                          PROCEDURE DATE:  2022          INTERPRETATION:  CLINICAL HISTORY / REASON FOR EXAM: Abdominal pain for   one month.    TECHNIQUE: Contiguous axial CT images were obtained from the lower chest   to the pubic symphysis following administration of 96 mL Omnipaque 350   intravenous contrast, 4 mL discarded. Oral contrast was not administered.   Reformatted images in the coronal and sagittal planes were acquired.    COMPARISON CT: None    FINDINGS:    Limited exam, patient is making contact with the gantry resulting in   significant artifact.    LOWER CHEST: Multiple scattered bilateral solid pulmonary nodules,   largest of which measures 8 mm in the right middle lobe (4/9).    HEPATOBILIARY: Unremarkable.    SPLEEN: Unremarkable.    PANCREAS: Unremarkable.    ADRENAL GLANDS: Unremarkable.    KIDNEYS: No hydronephrosis bilaterally.    ABDOMINOPELVIC NODES: There is diffuse rounded para-aortic   lymphadenopathy, measuring up to 1.7 cm in short axis.    PELVIC ORGANS: Confluent hypodense lesions are visualized possibly   involving the bilateral adnexa (601/70-50), measuring up to 6.8 x 4.5 cm,   however examination is limited as described above.    PERITONEUM/MESENTERY/BOWEL: No bowel obstruction or intraperitoneal free   air.`    BONES/SOFT TISSUES: Degenerative changes to the thoracolumbar spine. No   blastic or lytic lesions.    VASCULAR: Aorta is normal in caliber.      IMPRESSION:    Limited examination as described above.    Confluent hypodense lesions are visualized possibly involving the   bilateral adnexa (601/70-50), measuring up to 6.8 x 4.5 cm. Recommend   follow-up pelvic ultrasound.    Diffuse rounded para-aortic lymphadenopathy, measuring upto 1.7 cm in   short axis, compatible with neoplastic process versus less likely   infectious/inflammatory process. Recommend PET/CT and/or tissue sampling.    Multiple scattered bilateral solid pulmonary nodules, the largest of   which measures 8 mmin the right middle lobe.    Overall findings are suspicious for underlying metastatic ovarian   malignancy/lymphoma.    --- End of Report ---          LOWELL KRAMER MD; Resident Radiologist  This document has been electronically signed.  SAEID RETANA MD; Attending Radiologist  This document has been electronically signed. 2022  7:29PM    < end of copied text >

## 2022-07-15 NOTE — PATIENT PROFILE ADULT - FALL HARM RISK - RISK INTERVENTIONS

## 2022-07-15 NOTE — PROGRESS NOTE ADULT - ASSESSMENT
58 y/o F with PMHx of lupus, hypothyroidism, antiphospholipid syndrome, HTN, and anemia who presented to the ER complaining of diffuse pelvic pain and back pain x 3 weeks associated with decreased appetite and vomiting over the last 8 days.  Patient transferred from [facility] for workup of pelvic mass.    - CT A/P: bilateral adnexal hypodensities, para-aortic lymphadenopathy as well as bilateral pulm nodules, findings are suspicious for underlying metastatic ovarian malignancy/lymphoma.     Assessment  # Bilateral adnexal lesions, paraaortic lymphadenopathy, pulmonary nodule  # neutrophilic leucocytosis, with eosinophilia, monocytosis, lymphopenia and thormbocytosis  # microcytic anemia  # to rule gynecological vs hematological malignancy  no signs of infection  obtain 2 sets of blood cultures  check b12, folate level, peripheral smear, RC, LDH,   check- TV US,  CT chest  check , CA 19-9; CEA  consult hematology  Gyn/ onc consulted  IR consulted  hold antibiotics and monitor    #hypercalcemia   r/o related to malignancy vs dehydration and supplements; r/o hyperpara  patient take calcium and vitam D supplements- d/c supplement  start on IV lfuids- 150 ml/he  monitor ionized ca, PTH,PTH related peptide    #antiphospholipid syndrome  no h/o blood clots  takes aspirin daily- continue    #HTN  - c/w lisinopril 40mg oral daily  - monitor BP    #Hypothyroidism  - c/w levothyroxine 150mg oral daily    # lupus- cont Hydrozychloroquine    #DVT ppx: heparin    Pending: CT chest; biopsy- plan, heme eval, monitoring CBC, blood cultures, calcium levels

## 2022-07-15 NOTE — CONSULT NOTE ADULT - ATTENDING COMMENTS
58 y/o with nonspecific Sx found to have diffuse lymphadenopathy and pulmonary nodules  On exam pelvic is unremarkable, no prior PMB  CT reviewed, although poor quality films, diffuse lymphadenopathy seen, as well as small pulmonary nodules. Not sure why IR states there is no accessible tissue for Bx    Recommend:    Work up of leukocytosis, possible lymphopoietic  disease  IR Bx, several targets appear feasible    Will follow as necessary

## 2022-07-15 NOTE — PROGRESS NOTE ADULT - SUBJECTIVE AND OBJECTIVE BOX
24H events:    Patient is a 57y old Female who presents with a chief complaint of pelvic and back pain    Primary diagnosis of Suspected malignant neoplasm of ovary    Today is hospital day 1d. This morning patient was seen and examined at bedside, resting comfortably in bed.    No acute or major events overnight.    PAST MEDICAL & SURGICAL HISTORY  Lupus    Hypothyroidism    Iron deficiency anemia    No significant past surgical history      SOCIAL HISTORY:  Social History:  etOH: socially  Illicit drugs: denies  smoking: former smoker > 40 years ago (2022 22:35)      ALLERGIES:  NSAIDs (Stomach Upset)    MEDICATIONS:  STANDING MEDICATIONS  chlorhexidine 4% Liquid 1 Application(s) Topical <User Schedule>  ciprofloxacin   IVPB 400 milliGRAM(s) IV Intermittent every 12 hours  DULoxetine 60 milliGRAM(s) Oral daily  heparin   Injectable 5000 Unit(s) SubCutaneous every 8 hours  hydroxychloroquine 200 milliGRAM(s) Oral two times a day  levothyroxine 150 MICROGram(s) Oral daily  lisinopril 40 milliGRAM(s) Oral daily  melatonin 5 milliGRAM(s) Oral at bedtime  metroNIDAZOLE  IVPB 500 milliGRAM(s) IV Intermittent every 8 hours  sodium chloride 0.9%. 1000 milliLiter(s) IV Continuous <Continuous>    PRN MEDICATIONS  ondansetron Injectable 4 milliGRAM(s) IV Push every 8 hours PRN    VITALS:   T(F): 97.5  HR: 100  BP: 151/70  RR: 18  SpO2: 99%    PHYSICAL EXAM:  GENERAL: NAD, well-groomed, well-developed  NERVOUS SYSTEM:  Alert & Oriented X3, non focal   CHEST/LUNG: Clear to auscultation bilaterally  HEART: Regular rate and rhythm  ABDOMEN: Soft, Nontender, Nondistended; Bowel sounds present, left irregular pelvic mass. Mild suprapubic tenderness on deep palpation    LABS:                        9.6    39.35 )-----------( 803      ( 2022 17:12 )             30.1     07-14    137  |  96<L>  |  22<H>  ----------------------------<  76  4.2   |  18  |  1.1    Ca    13.3<H>      2022 17:12    TPro  7.0  /  Alb  4.0  /  TBili  0.4  /  DBili  <0.2  /  AST  45<H>  /  ALT  28  /  AlkPhos  177<H>        Urinalysis Basic - ( 2022 22:50 )    Color: Yellow / Appearance: Slightly Cloudy / S.025 / pH: x  Gluc: x / Ketone: 40  / Bili: Moderate / Urobili: 0.2 mg/dL   Blood: x / Protein: 30 mg/dL / Nitrite: Negative   Leuk Esterase: Trace / RBC: >50 /HPF / WBC 3-5 /HPF   Sq Epi: x / Non Sq Epi: Moderate /HPF / Bacteria: Few    Lactate, Blood: 2.2 mmol/L *H* (22 @ 17:12)    RADIOLOGY:

## 2022-07-15 NOTE — PATIENT PROFILE ADULT - FUNCTIONAL ASSESSMENT - BASIC MOBILITY 6.
2-calculated by average/Not able to assess (calculate score using [facility] averaging method)

## 2022-07-15 NOTE — PROGRESS NOTE ADULT - ASSESSMENT
Pt is a 58 y/o F with PMHx of lupus, hypothyroidism, antiphospholipid syndrome, HTN, and anemia who presented to the ER complaining of diffuse pelvic pain and back pain x 3 weeks associated with decreased appetite and vomiting over the last 8 days. Patient describes the pain as "achy" that comes and goes, starting 3 weeks ago rating it a 10/10.    ED course:  - Vitals: , /70, RR 18, SPO2 99%  - CT A/P: bilateral adnexal hypodensities, para-aortic lymphadenopathy as well as bilateral pulm nodules, findings are suspicious for underlying metastatic ovarian malignancy/lymphoma. Patient denies fever, chills, chest pain, shortness of breath, blood in the vomit or stool, unintentional weight loss.  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 cancer/lymphoma  - Heme/onc and gyn f/u  - IR consult for biopsy  - Pelvic U/S  - CT chest  - tumor markers     #UA leukocyte esterase  #leucocytosis   - ID f/u to r/o infectious etiology    #hypercalcemia - asymptomatic (no N/V, constipation)  ddx: hypercalcemia of malignancy, hyperparathyroidism  - Ca 13.3  - Blood Gas Calcium, Ionized - Venous: 1.64  - NS @ 150-200 ml/HR  - Check PTH    #microcytic anemia  ddx: anemia of chronic disease, iron deficiency anemia  -hgb 9.6  -iron studies  -monitor Hgb  -f/u occult blood    #antiphospholipid syndrome    #HTN  - c/w lisinopril 40mg oral daily  - monitor BP    #Hypothyroidism  - c/w levothyroxine 150mg oral daily    #DVT ppx: heparin           Pt is a 58 y/o F with PMHx of lupus, hypothyroidism, antiphospholipid syndrome, HTN, and anemia who presented to the ER complaining of diffuse pelvic pain and back pain x 3 weeks associated with decreased appetite and vomiting over the last 8 days. Patient describes the pain as "achy" that comes and goes, starting 3 weeks ago rating it a 10/10.    ED course:  - Vitals: , /70, RR 18, SPO2 99%  - CT A/P: bilateral adnexal hypodensities, para-aortic lymphadenopathy as well as bilateral pulm nodules, findings are suspicious for underlying metastatic ovarian malignancy/lymphoma. Patient denies fever, chills, chest pain, shortness of breath, blood in the vomit or stool, unintentional weight loss.  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 cancer/lymphoma  - CT A/P: bilateral adnexal hypodensities, para-aortic lymphadenopathy as well as bilateral pulm nodules, findings are suspicious for underlying metastatic ovarian malignancy/lymphoma.   - Heme/onc and gyn f/u  - IR consult for biopsy  - Pelvic U/S ordered  - gyn onc consult ordered  - CT chest for staging, pending   - tumor markers pending   - will continue prophylactic cipro flagyl for now     #UA leukocyte esterase (Cystits)   #leucocytosis (infectious vs reactive from suspect maliganncy)   - c/w cipro flagyl fo now     #hypercalcemia - asymptomatic (no N/V, constipation)  ddx: hypercalcemia of malignancy, hyperparathyroidism  - Ca 13.3  - Blood Gas Calcium, Ionized - Venous: 1.64  - NS @ 150-200 ml/HR started  - Check PTH, iCa *pending)   - f/u repeat Ca     #microcytic anemia  ddx: anemia of chronic disease, iron deficiency anemia  -hgb 9.6  -iron studies pending   -monitor Hgb  -f/u occult blood    #antiphospholipid syndrome    #HTN  - c/w lisinopril 40mg oral daily  - monitor BP    #Hypothyroidism  - c/w levothyroxine 150mg oral daily    #DVT ppx: heparin           Pt is a 58 y/o F with PMHx of lupus, hypothyroidism, antiphospholipid syndrome, HTN, and anemia who presented to the ER complaining of diffuse pelvic pain and back pain x 3 weeks associated with decreased appetite and vomiting over the last 8 days.    ED course:  - Vitals: , /70, RR 18, SPO2 99%  - CT A/P: bilateral adnexal hypodensities, para-aortic lymphadenopathy as well as bilateral pulm nodules, findings are suspicious for underlying metastatic ovarian malignancy/lymphoma. Patient denies fever, chills, chest pain, shortness of breath, blood in the vomit or stool, unintentional weight loss.  - Labs: Leukocytosis (WBC 39.9), anemia (Hg 9.6), hypercalcemia (13.3), lactic acidosis (2.2)  - UA: + ketones (40), +blood, + RBC (>50), trace leukocyte esterase    Patient transferred from [facility] for workup of pelvic mass.     Assessment  # Bilateral adnexal lesions, suspect malignancy   # Pulmonary nodules, suspect metastatic malignancy   - CT A/P: bilateral adnexal hypodensities, para-aortic lymphadenopathy as well as bilateral pulm nodules, findings are suspicious for underlying metastatic ovarian malignancy/lymphoma.   - IR consult for biopsy  - Pelvic U/S ordered  - gyn onc consult pending   - CT chest for staging, pending   - tumor markers pending   - will continue prophylactic cipro flagyl for now    # Asymptomatic bacteria    #leucocytosis (infectious vs reactive from suspect maliganncy)   - c/w cipro flagyl fo now, in case of intra abdominal infection     #hypercalcemia - asymptomatic (no N/V, constipation)  ddx: hypercalcemia of malignancy, hyperparathyroidism  - Ca 13.3  - Blood Gas Calcium, Ionized - Venous: 1.64  - NS @ 150-200 ml/HR started  - Check PTH, iCa *pending)   - f/u repeat Ca     #microcytic anemia  ddx: anemia of chronic disease, iron deficiency anemia  -hgb 9.6  -iron studies pending   -monitor Hgb  -f/u occult blood    #antiphospholipid syndrome    #HTN  - c/w lisinopril 40mg oral daily  - monitor BP    #Hypothyroidism  - c/w levothyroxine 150mg oral daily    #DVT ppx: heparin

## 2022-07-15 NOTE — CONSULT NOTE ADULT - SUBJECTIVE AND OBJECTIVE BOX
INTERVENTIONAL RADIOLOGY CONSULT:     HPI:  Patient is a 58 y/o F with a pmhx of lupus, hypothyroidism, antiphospholipid syndrome, HTN, anemia who reports to the ER complaining diffuse pelvic pain and back pain x 3 weeks associated with decreased appetite and vomiting over the last 8 days. Patient describes the pain as "achy" that comes and goes, starting 3 weeks ago rating it a 10/10. In the ED, CT A/P showed bilateral adnexal hypodensities, para-aortic lymphadenopathy as well as bilateral pulm nodules, findings are suspicious for underlying metastatic ovarian   malignancy/lymphoma.. Patient denies fever, chills, chest pain, shortness of breath, blood in the vomit or stool, unintentional weight loss.  (14 Jul 2022 22:35)    IR consulted for para-aortic lymph node biopsy    PAST MEDICAL & SURGICAL HISTORY:  Lupus  Hypothyroidism  Iron deficiency anemia    No significant past surgical history    MEDICATIONS  (STANDING):  chlorhexidine 4% Liquid 1 Application(s) Topical <User Schedule>  ciprofloxacin   IVPB 400 milliGRAM(s) IV Intermittent every 12 hours  DULoxetine 60 milliGRAM(s) Oral daily  heparin   Injectable 5000 Unit(s) SubCutaneous every 8 hours  hydroxychloroquine 200 milliGRAM(s) Oral two times a day  levothyroxine 150 MICROGram(s) Oral daily  lisinopril 40 milliGRAM(s) Oral daily  melatonin 5 milliGRAM(s) Oral at bedtime  metroNIDAZOLE  IVPB 500 milliGRAM(s) IV Intermittent every 8 hours  sodium chloride 0.9%. 1000 milliLiter(s) (150 mL/Hr) IV Continuous <Continuous>    MEDICATIONS  (PRN):  ondansetron Injectable 4 milliGRAM(s) IV Push every 8 hours PRN Nausea and/or Vomiting  traMADol 25 milliGRAM(s) Oral two times a day PRN Moderate Pain (4 - 6)      Allergies  NSAIDs (Stomach Upset)  Intolerances    Physical Exam:   Vital Signs Last 24 Hrs  T(C): 36.4 (15 Jul 2022 05:12), Max: 37.6 (15 Jul 2022 03:15)  T(F): 97.5 (15 Jul 2022 05:12), Max: 99.7 (15 Jul 2022 03:15)  HR: 101 (15 Jul 2022 13:58) (100 - 110)  BP: 188/85 (15 Jul 2022 13:58) (149/72 - 192/83)  BP(mean): --  RR: 18 (15 Jul 2022 05:12) (18 - 20)  SpO2: 99% (14 Jul 2022 22:37) (98% - 99%)    Parameters below as of 14 Jul 2022 22:37  Patient On (Oxygen Delivery Method): room air        Labs:                         9.6    39.35 )-----------( 803      ( 14 Jul 2022 17:12 )             30.1     07-14    137  |  96<L>  |  22<H>  ----------------------------<  76  4.2   |  18  |  1.1    Ca    13.3<H>      14 Jul 2022 17:12    TPro  7.0  /  Alb  4.0  /  TBili  0.4  /  DBili  <0.2  /  AST  45<H>  /  ALT  28  /  AlkPhos  177<H>  07-14        Pertinent labs:                      9.6    39.35 )-----------( 803      ( 14 Jul 2022 17:12 )             30.1       07-14    137  |  96<L>  |  22<H>  ----------------------------<  76  4.2   |  18  |  1.1    Ca    13.3<H>      14 Jul 2022 17:12    TPro  7.0  /  Alb  4.0  /  TBili  0.4  /  DBili  <0.2  /  AST  45<H>  /  ALT  28  /  AlkPhos  177<H>  07-14      Radiology imaging: reviewed.     ASSESSMENT/PLAN:     Pt with noted para-aortic lymphadenopathy, IR consulted for tissue biopsy. No safe target on the CT a/p, noted para-aortic LAD is too deep. Will continue to follow and review CT chest once completed for a potential targed. Discussed with clinical team.     M-F 8am - 5pm: [phone]  Other times including weekend: [phone]    Thank you for the courtesy of this consult.

## 2022-07-15 NOTE — CONSULT NOTE ADULT - ASSESSMENT
56 yo, G0, LMP 7/10/22, with newly discovered 6.8cm adnexal mass and possible lung metastasis, r/o GYN malignancy   -f/u TVUS, tumor markers  -f/u IR recommendations for biopsy   -will reevaluate need for endometrial biopsy after biopsy results  -GYN will continue to follow     Dr Skinner bedside, Dr Haq aware     58 yo, G0, LMP 7/10/22, with newly discovered 6.8cm adnexal mass and possible lung metastasis, r/o GYN malignancy.     -f/u TVUS, tumor markers  -f/u IR recommendations for biopsy of lung nodules  -workup for infectious causes of symptoms per primary team  -GYN will continue to follow     Dr Skinner bedside, Dr Haq aware

## 2022-07-16 NOTE — CONSULT NOTE ADULT - SUBJECTIVE AND OBJECTIVE BOX
Patient is a 57y old  Female who presents with a chief complaint of Abd/pelvic pain (15 Jul 2022 17:28)      HPI:  Patient is a 56 y/o F with a pmhx of lupus, hypothyroidism, antiphospholipid syndrome, HTN, anemia who reports to the ER complaining diffuse pelvic pain and back pain x 3 weeks associated with decreased appetite and vomiting over the last 8 days. Patient describes the pain as "achy" that comes and goes, starting 3 weeks ago rating it a 10/10. In the ED, CT A/P showed bilateral adnexal hypodensities, para-aortic lymphadenopathy as well as bilateral pulm nodules, findings are suspicious for underlying metastatic ovarian   malignancy/lymphoma.. Patient denies fever, chills, chest pain, shortness of breath, blood in the vomit or stool, unintentional weight loss.  (14 Jul 2022 22:35)       ROS:  Negative except for:    PAST MEDICAL & SURGICAL HISTORY:  Lupus      Hypothyroidism      Iron deficiency anemia      No significant past surgical history          SOCIAL HISTORY:    FAMILY HISTORY:  No pertinent family history in first degree relatives        MEDICATIONS  (STANDING):  aspirin  chewable 81 milliGRAM(s) Oral daily  chlorhexidine 4% Liquid 1 Application(s) Topical <User Schedule>  DULoxetine 60 milliGRAM(s) Oral daily  heparin   Injectable 5000 Unit(s) SubCutaneous every 8 hours  hydroxychloroquine 200 milliGRAM(s) Oral two times a day  levothyroxine 150 MICROGram(s) Oral daily  lisinopril 40 milliGRAM(s) Oral daily  melatonin 5 milliGRAM(s) Oral at bedtime  sodium chloride 0.9%. 1000 milliLiter(s) (150 mL/Hr) IV Continuous <Continuous>    MEDICATIONS  (PRN):  ondansetron Injectable 4 milliGRAM(s) IV Push every 8 hours PRN Nausea and/or Vomiting  traMADol 25 milliGRAM(s) Oral two times a day PRN Moderate Pain (4 - 6)      Allergies    NSAIDs (Stomach Upset)    Intolerances        Vital Signs Last 24 Hrs  T(C): 36.7 (16 Jul 2022 04:04), Max: 36.7 (16 Jul 2022 04:04)  T(F): 98 (16 Jul 2022 04:04), Max: 98 (16 Jul 2022 04:04)  HR: 95 (16 Jul 2022 04:04) (95 - 101)  BP: 173/78 (16 Jul 2022 04:04) (173/78 - 188/85)  BP(mean): --  RR: --  SpO2: 93% (16 Jul 2022 04:04) (93% - 93%)        PHYSICAL EXAM  General: adult in NAD  HEENT: clear oropharynx, anicteric sclera, pink conjunctiva  Neck: supple  CV: normal S1/S2 with no murmur rubs or gallops  Lungs: positive air movement b/l ant lungs,clear to auscultation, no wheezes, no rales  Abdomen: soft non-tender non-distended, no hepatosplenomegaly  Ext: no clubbing cyanosis or edema  Skin: no rashes and no petechiae  Neuro: alert and oriented X 4, no focal deficits      LABS:                          8.5    34.36 )-----------( 613      ( 15 Jul 2022 17:59 )             26.0         Mean Cell Volume : 75.8 fL  Mean Cell Hemoglobin : 24.8 pg  Mean Cell Hemoglobin Concentration : 32.7 g/dL  Auto Neutrophil # : x  Auto Lymphocyte # : x  Auto Monocyte # : x  Auto Eosinophil # : x  Auto Basophil # : x  Auto Neutrophil % : x  Auto Lymphocyte % : x  Auto Monocyte % : x  Auto Eosinophil % : x  Auto Basophil % : x      Serial CBC's  07-15 @ 17:59  Hct-26.0 / Hgb-8.5 / Plat-613 / RBC-3.43 / WBC-34.36  Serial CBC's  07-14 @ 17:12  Hct-30.1 / Hgb-9.6 / Plat-803 / RBC-3.97 / WBC-39.35      07-15    136  |  98  |  21<H>  ----------------------------<  92  4.5   |  20  |  1.0    Ca    12.1<H>      15 Jul 2022 17:59    TPro  6.5  /  Alb  3.3<L>  /  TBili  0.3  /  DBili  x   /  AST  59<H>  /  ALT  35  /  AlkPhos  178<H>  07-15          Ferritin, Serum: 85 ng/mL (07-15 @ 11:27)  Iron - Total Binding Capacity.: 314 ug/dL (07-15 @ 11:27)  Ferritin, Serum: 87 ng/mL (07-15 @ 11:27)  Iron - Total Binding Capacity.: 288 ug/dL (07-15 @ 11:27)              BLOOD SMEAR INTERPRETATION:     < from: CT Abdomen and Pelvis w/ IV Cont (07.14.22 @ 18:24) >  IMPRESSION:    Limited examination as described above.    Confluent hypodense lesions are visualized possibly involving the   bilateral adnexa (601/70-50), measuring up to 6.8 x 4.5 cm. Recommend   follow-up pelvic ultrasound.    Diffuse rounded para-aortic lymphadenopathy, measuring upto 1.7 cm in   short axis, compatible with neoplastic process versus less likely   infectious/inflammatory process. Recommend PET/CT and/or tissue sampling.    Multiple scattered bilateral solid pulmonary nodules, the largest of   which measures 8 mmin the right middle lobe.    Overall findings are suspicious for underlying metastatic ovarian   malignancy/lymphoma.    < end of copied text >    RADIOLOGY & ADDITIONAL STUDIES:

## 2022-07-16 NOTE — CONSULT NOTE ADULT - ATTENDING COMMENTS
seen/examined w/ hemonc fellow; note reviewed;case discussed    CT CAP reviewed; GYN ONC to discuss with IR and pulmonary the type of biopsy and site to establish the diagnosis  leukocytosis/ thrombocytosis: r/o MPN. Obtain BCR/ABL, JAK2,MPL, CALR;  anemia w/u including iron,tibc,ferritin, MMA, b12,ferritin    pt revealed she has positive aniphospholipid antibodies with no evidence of arterial or venous thrombosis, besides one episode of miscarriage; no clear evidence of APL Syndrome; hold ASA now as biopsy is pending: prefer to have bx done inpatient    complete hemolysis w/u

## 2022-07-16 NOTE — PROGRESS NOTE ADULT - ATTENDING COMMENTS
56 y/o F with PMHx of lupus, hypothyroidism, antiphospholipid syndrome, HTN, and anemia who presented to the ER complaining of diffuse pelvic pain and back pain x 3 weeks associated with decreased appetite and vomiting over the last 8 days.  Patient transferred from [facility] for workup of pelvic mass.    - CT A/P: bilateral adnexal hypodensities, para-aortic lymphadenopathy as well as bilateral pulm nodules, findings are suspicious for underlying metastatic ovarian malignancy/lymphoma.     CT chest:Multiple (at least 15) bilateral solid pulmonary nodules, measuring up to   1.7 cm at left upper lobe. Pulmonary metastases suspected.    US- TV- Right pelvic 6.6 cm heterogeneous mixed solid-cystic structure; may   represent ovarian mass.  Lower pelvic 5.6 cm heterogeneous, solid appearing structure without   appreciable Doppler flow; may represent additional ovarian mass or   degenerating fibroid.      Assessment  # Bilateral adnexal lesions, paraaortic lymphadenopathy, pulmonary nodule  # neutrophilic leucocytosis, with eosinophilia, monocytosis, lymphopenia and thormbocytosis  # microcytic anemia  # to rule gynecological vs hematological malignancy  no signs of infection  monitor - blood cultures  hematology evaluated- monitor MPD bld work panel, iron, b12, hemolytic panel  monitor , CA 19-9; CEA  Gyn-onc/ IR following- plan for biopsy to be decided    #hypercalcemia   r/o related to malignancy vs dehydration and supplements; r/o hyperpara  patient take calcium and vitam D supplements- d/c supplement  -  IV fluids- 150 ml/he  monitor ionized ca, PTH,PTH related peptide    #antiphospholipid syndrome  no h/o blood clots  takes aspirin daily- continue    #HTN  - c/w lisinopril 40mg oral daily  - monitor BP    #Hypothyroidism  - c/w levothyroxine 150mg oral daily    # lupus- cont Hydrozychloroquine    #DVT ppx: heparin    Pending: CT chest; biopsy- plan, heme eval, monitoring CBC, blood cultures, calcium levels 58 y/o F with PMHx of lupus, hypothyroidism, antiphospholipid syndrome, HTN, and anemia who presented to the ER complaining of diffuse pelvic pain and back pain x 3 weeks associated with decreased appetite and vomiting over the last 8 days.  Patient transferred from [facility] for workup of pelvic mass.    - CT A/P: bilateral adnexal hypodensities, para-aortic lymphadenopathy as well as bilateral pulm nodules, findings are suspicious for underlying metastatic ovarian malignancy/lymphoma.     CT chest:Multiple (at least 15) bilateral solid pulmonary nodules, measuring up to   1.7 cm at left upper lobe. Pulmonary metastases suspected.    US- TV- Right pelvic 6.6 cm heterogeneous mixed solid-cystic structure; may   represent ovarian mass.  Lower pelvic 5.6 cm heterogeneous, solid appearing structure without   appreciable Doppler flow; may represent additional ovarian mass or   degenerating fibroid.      Assessment  # Bilateral adnexal lesions, paraaortic lymphadenopathy, pulmonary nodule  # neutrophilic leucocytosis, with eosinophilia, monocytosis, lymphopenia and thormbocytosis  # microcytic anemia  # to rule gynecological vs hematological malignancy  no signs of infection  monitor - blood cultures  hematology evaluated- monitor MPD bld work panel, iron, b12, hemolytic panel  monitor , CA 19-9; CEA  Gyn-onc/ IR following- plan for biopsy to be decided    #hypercalcemia   r/o related to malignancy vs dehydration and supplements; r/o hyperpara  patient take calcium and vitam D supplements- d/c supplement  -  IV fluids- 150 ml/he  monitor ionized ca, PTH,PTH related peptide    # COVID exposure- on 7/16- isolation precautions    #antiphospholipid syndrome  no h/o blood clots  takes aspirin daily- hold for now    #HTN  - c/w lisinopril 40mg oral daily  - monitor BP    #Hypothyroidism  - c/w levothyroxine 150mg oral daily    # lupus- cont Hydrozychloroquine    #DVT ppx: heparin    Pending: IR follow up; biopsy- plan, monitoring CBC, blood cultures, calcium levels

## 2022-07-16 NOTE — PROGRESS NOTE ADULT - SUBJECTIVE AND OBJECTIVE BOX
KESLIE JUAREZ 57y Female  MRN#: [id number]   Hospital Day: 2d    SUBJECTIVE  Patient is a 57y old Female who presents with a chief complaint of Abd/pelvic pain (15 Jul 2022 17:28)  Currently admitted to medicine with the primary diagnosis of Suspected malignant neoplasm of ovary      INTERVAL HPI AND OVERNIGHT EVENTS:  Patient was examined and seen at bedside. This morning she is resting comfortably in bed and reports no issues or overnight events.      OBJECTIVE  PAST MEDICAL & SURGICAL HISTORY  Lupus    Hypothyroidism    Iron deficiency anemia    No significant past surgical history      ALLERGIES:  NSAIDs (Stomach Upset)    MEDICATIONS:  STANDING MEDICATIONS  aspirin  chewable 81 milliGRAM(s) Oral daily  chlorhexidine 4% Liquid 1 Application(s) Topical <User Schedule>  DULoxetine 60 milliGRAM(s) Oral daily  heparin   Injectable 5000 Unit(s) SubCutaneous every 8 hours  hydroxychloroquine 200 milliGRAM(s) Oral two times a day  levothyroxine 150 MICROGram(s) Oral daily  lisinopril 40 milliGRAM(s) Oral daily  melatonin 5 milliGRAM(s) Oral at bedtime  sodium chloride 0.9%. 1000 milliLiter(s) IV Continuous <Continuous>  sodium chloride 0.9%. 1000 milliLiter(s) IV Continuous <Continuous>    PRN MEDICATIONS  ondansetron Injectable 4 milliGRAM(s) IV Push every 8 hours PRN  traMADol 25 milliGRAM(s) Oral two times a day PRN      PHYSICAL EXAM:  GENERAL: NAD, well-groomed, well-developed  NERVOUS SYSTEM:  Alert & Oriented X3, non focal   CHEST/LUNG: Clear to auscultation bilaterally  HEART: Regular rate and rhythm  ABDOMEN: Soft, Nontender, Nondistended; Bowel sounds present, left irregular pelvic mass. Mild suprapubic tenderness on deep palpation    VITAL SIGNS: Last 24 Hours  T(C): 36.7 (2022 04:04), Max: 36.7 (2022 04:04)  T(F): 98 (2022 04:04), Max: 98 (2022 04:04)  HR: 95 (2022 04:04) (95 - 101)  BP: 173/78 (2022 04:04) (173/78 - 188/85)  BP(mean): --  RR: --  SpO2: 93% (2022 04:04) (93% - 93%)    LABS:                        8.5    34.36 )-----------( 613      ( 15 Jul 2022 17:59 )             26.0     07-15    136  |  98  |  21<H>  ----------------------------<  92  4.5   |  20  |  1.0    Ca    12.1<H>      15 Jul 2022 17:59    TPro  6.5  /  Alb  3.3<L>  /  TBili  0.3  /  DBili  x   /  AST  59<H>  /  ALT  35  /  AlkPhos  178<H>  07-15      Urinalysis Basic - ( 2022 22:50 )    Color: Yellow / Appearance: Slightly Cloudy / S.025 / pH: x  Gluc: x / Ketone: 40  / Bili: Moderate / Urobili: 0.2 mg/dL   Blood: x / Protein: 30 mg/dL / Nitrite: Negative   Leuk Esterase: Trace / RBC: >50 /HPF / WBC 3-5 /HPF   Sq Epi: x / Non Sq Epi: Moderate /HPF / Bacteria: Few

## 2022-07-16 NOTE — PROGRESS NOTE ADULT - ASSESSMENT
HOSPITAL COURSE:  Pt is a 56 y/o F with PMHx of lupus, hypothyroidism, antiphospholipid syndrome, HTN, and anemia who presented to the ER complaining of diffuse pelvic pain and back pain x 3 weeks associated with decreased appetite and vomiting over the last 8 days.    ED course:  - Vitals: , /70, RR 18, SPO2 99%  - CT A/P: bilateral adnexal hypodensities, para-aortic lymphadenopathy as well as bilateral pulm nodules, findings are suspicious for underlying metastatic ovarian malignancy/lymphoma. Patient denies fever, chills, chest pain, shortness of breath, blood in the vomit or stool, unintentional weight loss.  - Labs: Leukocytosis (WBC 39.9), anemia (Hg 9.6), hypercalcemia (13.3), lactic acidosis (2.2)  - UA: + ketones (40), +blood, + RBC (>50), trace leukocyte esterase    Patient transferred from [facility] for workup of pelvic mass.     Assessment  # Bilateral adnexal lesions, suspect malignancy   # Pulmonary nodules, suspect metastatic malignancy   - CT A/P: bilateral adnexal hypodensities, para-aortic lymphadenopathy as well as bilateral pulm nodules, findings are suspicious for underlying metastatic ovarian malignancy/lymphoma.   - IR consult for biopsy  - Pelvic U/S ordered  - gyn onc consult pending   - CT chest for staging, pending   - tumor markers pending   - will continue prophylactic cipro flagyl for now  - f/u with IR once CT Chest is read     # Thrombocytosis   # Eosinophilic Predominance   # Leukocytosis   - heme onc consult to r/o malignancy     # Asymptomatic bacteria    #leucocytosis (infectious vs reactive from suspect maliganncy)   - c/w cipro flagyl fo now, in case of intra abdominal infection     #hypercalcemia - asymptomatic (no N/V, constipation)  ddx: hypercalcemia of malignancy, hyperparathyroidism  - Ca 13.3  - Blood Gas Calcium, Ionized - Venous: 1.64  - NS @ 150-200 ml/HR started  - Check PTH, iCa *pending)   - f/u repeat Ca     #microcytic anemia  ddx: anemia of chronic disease, iron deficiency anemia  -hgb 9.6  -iron studies pending   -monitor Hgb  -f/u occult blood    #antiphospholipid syndrome    #HTN  - c/w lisinopril 40mg oral daily  - monitor BP  - add prcardia 30 qd     #Hypothyroidism  - c/w levothyroxine 150mg oral daily    #DVT ppx: heparin

## 2022-07-16 NOTE — CONSULT NOTE ADULT - ASSESSMENT
Patient is a 56 y/o F with a pmhx of lupus, hypothyroidism, antiphospholipid syndrome, HTN, anemia who reports to the ER complaining diffuse pelvic pain and back pain x 3 weeks associated with decreased appetite and vomiting over the last 8 days.     R/o myeloproliferative disorder: Possibly reactive to the mass and/or autoimmune disease  - Send peripheral flow, check BCR-ABL, Jak2, CALR, MPL, PDGFR, ESR    Bilateral adnexal masses with para-aortlic lymphadenopathy and subcentimeter pulmonary nodules:  - TVUS per gyn pending  - Need biopsy  - Check LDH     Patient is a 58 y/o F with a pmhx of lupus, hypothyroidism, antiphospholipid syndrome, HTN, anemia who reports to the ER complaining diffuse pelvic pain and back pain x 3 weeks associated with decreased appetite and vomiting over the last 8 days.     R/o myeloproliferative disorder: Possibly reactive to the mass and/or autoimmune disease  - Send peripheral flow, check BCR-ABL, Jak2, CALR, MPL, PDGFRA, ESR    Bilateral adnexal masses with para-aortlic lymphadenopathy and subcentimeter pulmonary nodules:  - TVUS per gyn pending  - Need biopsy    MIcrocytic anemia:  - Check iron studies  - CHeck haptoglobin, Direct mitchell   Patient is a 58 y/o F with a pmhx of lupus, hypothyroidism, antiphospholipid syndrome, HTN, anemia who reports to the ER complaining diffuse pelvic pain and back pain x 3 weeks associated with decreased appetite and vomiting over the last 8 days.     R/o myeloproliferative disorder: Possibly reactive to the mass and/or autoimmune disease  - Send peripheral flow, check BCR-ABL, Jak2, CALR, MPL, PDGFRA, ESR    Bilateral adnexal masses with para-aortlic lymphadenopathy and pulmonary nodules:  - TVUS per gyn pending  - Need biopsy    MIcrocytic anemia:  - Check iron studies  - CHeck haptoglobin, Direct mitchell   Patient is a 58 y/o F with a pmhx of lupus, hypothyroidism, antiphospholipid syndrome, HTN, anemia who reports to the ER complaining diffuse pelvic pain and back pain x 3 weeks associated with decreased appetite and vomiting over the last 8 days.     R/o myeloproliferative disorder: Possibly reactive to the mass and/or autoimmune disease  - Send peripheral flow, check BCR-ABL, Jak2, CALR, MPL, PDGFRA, ESR    Bilateral adnexal masses with para-aortlic lymphadenopathy and pulmonary nodules:  - TVUS per gyn pending  - Need biopsy    MIcrocytic anemia:  - Check iron studies  - CHeck haptoglobin, Direct mitchell    Hx of + APL abs: on ASA per rheum, would hold ASA for now for pending bx; no hx of clots or recurrent miscarriages

## 2022-07-17 NOTE — PROGRESS NOTE ADULT - ASSESSMENT
Pt is a 56 y/o F with PMHx of lupus, hypothyroidism, antiphospholipid syndrome, HTN, and anemia who presented to the ER complaining of diffuse pelvic pain and back pain x 3 weeks associated with decreased appetite and vomiting over the last 8 days.    # hypercalcemia - this was likely the cause of the vomiting and decr appetite  iPTH < 20 -> hyperpara RULED OUT  likely malign in origin - prob lymphoma, which would make it amenable to steroids  Ca is improving w/ fluids  IVFs: /hr + oral hydration  will hold off on Aredia as pt responding to fluids  will need to get Ca to < 11.5 so she can have sedation/anesth to obtain tissue bx  highly unlikely to be sarcoid  f/u PTHrP  obtain 25 OH Vit D  BMP q24    # suspect malignancy: b/l adnexal lesions; paraaortic LN; 15+ pulm nodules;   CT C: 15+ pulm nod up to 1.7cm - f/u IR re bx  CT A/P: bilateral adnexal hypodensities, para-aortic lymphadenopathy as well as bilateral pulm nodules  TVUS: Rt 6.6cm mixed solid-cystic structure; Lt 5.6 cm heterog, solid structure w/ doppler flow   H/O noted  Gyn/Onc noted  : 33 (nl); CA 19-9: 19 (nl); CEA 5.5; ; HCG < 0.6; S preg neg; ESR 80  Inhibin A/B: pending    # marked leukocytosis  prob reactive and concentrated   improving  cx's neg; cont OFF abx  f/u BCR/ABL; CALR; MPL mut analysis; JAK2; flow cyto - I asked PGY 1 to draw these  consider BM Bx    # microcytic anemia  prob of malign  iron sat 10%, TIBC 314, Ferr 85 = could be iron def  no IV iron as we cont to r/o infection  FeSO4 325mg po q24  B12, Fol nl  f/u hapto  f/u MMA  retic 2.7%; t red 0.3   - I highly doubt this is hemolysis, even intramedullary; I suspect this is marker for lymphoma    # Thrombocytosis   likely reactive; improving; down from 803 w/ fluid    # abnl LFTs mild  prob related to underlying issue above  no further w/u for now    # antiphospholipid syndrome  prob just element of lupus hx  no clots; only 1 miscarriage  hold asa  no a/c    # lupus - does not seem active currently  c/w HCQ 200mg po q12  c/w cymbalta  c/w tramadol prn    # HTN  c/w lisinopril 40mg oral daily  add procardia xl 30 qd     # Hypothyroidism  c/w levothyroxine 150mg oral daily  check TSH    # DVT ppx: LMWH    # GI ppx: none    # Activity: can walk on her own    # Code: full    Dispo: need tissue dx - f/u IR and H/O and G/O; f/u labs above; IVFs; f/u Ca;   eventually, pt will go home - would ideally like to obtain bx first

## 2022-07-17 NOTE — PROGRESS NOTE ADULT - SUBJECTIVE AND OBJECTIVE BOX
KELSIE JUAREZ  57y  Female  ***My note supersedes ALL resident notes that I sign.  My corrections for their notes are in my note.***    I can be reached directly on [employer] [id number]. My office number is [phone]. My personal cell number is [phone].    INTERVAL EVENTS: Here for f/u of vomiting. Pt is feeling better and has no complaints. Has some minor leg weakness, but can get to BR. Pt can now eat/drink fine. Pt waiting on bx. No fever, chills or sweats.    T(F): 96.2 (07-17-22 @ 05:00), Max: 97.8 (07-16-22 @ 14:11)  HR: 79 (07-17-22 @ 08:05) (68 - 99)  BP: 110/66 (07-17-22 @ 05:00) (101/57 - 169/74)  RR: 18 (07-16-22 @ 22:01) (18 - 18)  SpO2: 97% (07-17-22 @ 08:05) (97% - 97%)    BMI 66.6 = morbid obesity    Gen: NAD  HEENT: PERRL, EOMI, mouth clr, nose clr  Neck: no nodes, no JVD, thyroid nl  lungs: clr  hrt: s1 s2 rrr no murmur  abd: soft, NT/ND, no HS megaly; hard to eval for mass given body habitus  ext: no edema, no c/c  neuro: aa ox3, cn intact, can move all 4 ext    LABS:                      8.4     (    77.3   26.77 )-----------( ---------      488      ( 17 Jul 2022 08:55 )             26.5    (    20.8     WBC Count: 26.77 K/uL (07-17-22 @ 08:55)  WBC Count: 39.23 K/uL (07-16-22 @ 07:29)  WBC Count: 34.36 K/uL (07-15-22 @ 17:59)  WBC Count: 39.35 K/uL (07-14-22 @ 17:12)    Hemoglobin: 8.4 g/dL (07-17 @ 08:55)  Hemoglobin: 9.3 g/dL (07-16 @ 07:29)  Hemoglobin: 8.5 g/dL (07-15 @ 17:59)  Hemoglobin: 9.6 g/dL (07-14 @ 17:12)    Platelet Count - Automated: 488 K/uL (07-17-22 @ 08:55)  Platelet Count - Automated: 696 K/uL (07-16-22 @ 07:29)  Platelet Count - Automated: 613 K/uL (07-15-22 @ 17:59)  Platelet Count - Automated: 803 K/uL (07-14-22 @ 17:12)    138   (   101   (   110      07-17-22 @ 08:55  ----------------------               4.6   (   26   (   19                             -----                        0.8  Ca  11.7   Mg  1.8    P   --     LFT  5.9  (  0.3  (  39       07-17-22 @ 08:55  -------------------------  3.4  (  158  (  34    Culture - Blood (collected 07-15-22 @ 23:17)  Source: .Blood None  Preliminary Report (07-17-22 @ 07:01):    No growth to date.    Culture - Blood (collected 07-15-22 @ 23:17)  Source: .Blood None  Preliminary Report (07-17-22 @ 07:01):    No growth to date.    RADIOLOGY & ADDITIONAL TESTS:  < from: US Transvaginal (07.15.22 @ 20:40) >  IMPRESSION:  Right pelvic 6.6 cm heterogeneous mixed solid-cystic structure; may   represent ovarian mass.  Lower pelvic 5.6 cm heterogeneous, solid appearing structure without   appreciable Doppler flow; may represent additional ovarian mass or   degenerating fibroid.    < end of copied text >    < from: CT Chest w/ IV Cont (07.15.22 @ 19:05) >  IMPRESSION:    Multiple (at least 15) bilateral solid pulmonary nodules, measuring up to   1.7 cm at left upper lobe. Pulmonary metastases suspected.    < end of copied text >    < from: CT Abdomen and Pelvis w/ IV Cont (07.14.22 @ 18:24) >  IMPRESSION:    Limited examination as described above.    Confluent hypodense lesions are visualized possibly involving the   bilateral adnexa (601/70-50), measuring up to 6.8 x 4.5 cm. Recommend   follow-up pelvic ultrasound.    Diffuse rounded para-aortic lymphadenopathy, measuring up to 1.7 cm in   short axis, compatible with neoplastic process versus less likely   infectious/inflammatory process. Recommend PET/CT and/or tissue sampling.    Multiple scattered bilateral solid pulmonary nodules, the largest of   which measures 8 mmin the right middle lobe.    Overall findings are suspicious for underlying metastatic ovarian   malignancy/lymphoma.    < end of copied text >    MEDICATIONS:  hydroxychloroquine 200 milliGRAM(s) Oral two times a day    chlorhexidine 4% Liquid 1 Application(s) Topical <User Schedule>  DULoxetine 60 milliGRAM(s) Oral daily  heparin   Injectable 5000 Unit(s) SubCutaneous every 8 hours  levothyroxine 150 MICROGram(s) Oral daily  lisinopril 40 milliGRAM(s) Oral daily  melatonin 5 milliGRAM(s) Oral at bedtime  NIFEdipine XL 30 milliGRAM(s) Oral daily  ondansetron Injectable 4 milliGRAM(s) IV Push every 8 hours PRN  sodium chloride 0.9%. 1000 milliLiter(s) IV Continuous <Continuous>  traMADol 25 milliGRAM(s) Oral two times a day PRN

## 2022-07-18 NOTE — PROGRESS NOTE ADULT - SUBJECTIVE AND OBJECTIVE BOX
INTERVENTIONAL RADIOLOGY PROGRESS NOTE      56 y/o F with a pmhx of lupus, hypothyroidism, antiphospholipid syndrome, HTN, anemia who reports to the ER complaining diffuse pelvic pain and back pain x 3 weeks associated with decreased appetite and vomiting over the last 8 days. Patient describes the pain as "achy" that comes and goes, starting 3 weeks ago rating it a 10/10. In the ED, CT A/P showed bilateral adnexal hypodensities, para-aortic lymphadenopathy as well as bilateral pulm nodules, findings are suspicious for underlying metastatic ovarian malignancy/lymphoma.. Patient denies fever, chills, chest pain, shortness of breath, blood in the vomit or stool, unintentional weight loss.  (14 Jul 2022 22:35)    IR consulted for para-aortic lymph node biopsy    VITALS: T(F): 97.7 (07-18-22 @ 05:10), Max: 98.3 (07-17-22 @ 22:17)  HR: 74 (07-18-22 @ 05:10) (71 - 94)  BP: 110/64 (07-18-22 @ 05:10) (110/64 - 145/72)  RR: 20 (07-18-22 @ 05:10) (18 - 20)  SpO2: --  Wt(kg): --    PHYSICAL EXAM:  Vital Signs Last 24 Hrs  T(C): 36.5 (18 Jul 2022 05:10), Max: 36.8 (17 Jul 2022 22:17)  T(F): 97.7 (18 Jul 2022 05:10), Max: 98.3 (17 Jul 2022 22:17)  HR: 74 (18 Jul 2022 05:10) (71 - 94)  BP: 110/64 (18 Jul 2022 05:10) (110/64 - 145/72)  BP(mean): --  RR: 20 (18 Jul 2022 05:10) (18 - 20)  SpO2: --        LABS:                        8.4    23.63 )-----------( 261      ( 18 Jul 2022 05:42 )             26.1     07-18    136  |  100  |  16  ----------------------------<  104<H>  4.2   |  24  |  0.8    Ca    11.1<H>      18 Jul 2022 05:42  Mg     1.8     07-18    TPro  5.5<L>  /  Alb  3.1<L>  /  TBili  0.3  /  DBili  x   /  AST  31  /  ALT  27  /  AlkPhos  150<H>  07-18                              8.4    23.63 )-----------( 261      ( 18 Jul 2022 05:42 )             26.1       07-18    136  |  100  |  16  ----------------------------<  104<H>  4.2   |  24  |  0.8    Ca    11.1<H>      18 Jul 2022 05:42  Mg     1.8     07-18    TPro  5.5<L>  /  Alb  3.1<L>  /  TBili  0.3  /  DBili  x   /  AST  31  /  ALT  27  /  AlkPhos  150<H>  07-18          RADIOLOGY IMAGING: reviewed.     ASSESSMENT AND PLAN:    Pt with noted para-aortic lymphadenopathy, IR consulted for tissue biopsy. No safe target on the CT a/p, noted para-aortic LAD is too deep. Findings on CT chest are noted. Please have pt follow up with outpatient PET/CT for further evaluation with possible potential for biopsy target. F/u with gyn/onc recommendations.     Please call Interventional Radiology with questions or concerns:   - M-F [phone]: [phone]   - All other hours: [phone] INTERVENTIONAL RADIOLOGY PROGRESS NOTE      58 y/o F with a pmhx of lupus, hypothyroidism, antiphospholipid syndrome, HTN, anemia who reports to the ER complaining diffuse pelvic pain and back pain x 3 weeks associated with decreased appetite and vomiting over the last 8 days. Patient describes the pain as "achy" that comes and goes, starting 3 weeks ago rating it a 10/10. In the ED, CT A/P showed bilateral adnexal hypodensities, para-aortic lymphadenopathy as well as bilateral pulm nodules, findings are suspicious for underlying metastatic ovarian malignancy/lymphoma.. Patient denies fever, chills, chest pain, shortness of breath, blood in the vomit or stool, unintentional weight loss.  (14 Jul 2022 22:35)    IR consulted for tissue biopsy.     VITALS: T(F): 97.7 (07-18-22 @ 05:10), Max: 98.3 (07-17-22 @ 22:17)  HR: 74 (07-18-22 @ 05:10) (71 - 94)  BP: 110/64 (07-18-22 @ 05:10) (110/64 - 145/72)  RR: 20 (07-18-22 @ 05:10) (18 - 20)  SpO2: --  Wt(kg): --    PHYSICAL EXAM:  Vital Signs Last 24 Hrs  T(C): 36.5 (18 Jul 2022 05:10), Max: 36.8 (17 Jul 2022 22:17)  T(F): 97.7 (18 Jul 2022 05:10), Max: 98.3 (17 Jul 2022 22:17)  HR: 74 (18 Jul 2022 05:10) (71 - 94)  BP: 110/64 (18 Jul 2022 05:10) (110/64 - 145/72)  BP(mean): --  RR: 20 (18 Jul 2022 05:10) (18 - 20)  SpO2: --        LABS:                        8.4    23.63 )-----------( 261      ( 18 Jul 2022 05:42 )             26.1     07-18    136  |  100  |  16  ----------------------------<  104<H>  4.2   |  24  |  0.8    Ca    11.1<H>      18 Jul 2022 05:42  Mg     1.8     07-18    TPro  5.5<L>  /  Alb  3.1<L>  /  TBili  0.3  /  DBili  x   /  AST  31  /  ALT  27  /  AlkPhos  150<H>  07-18                              8.4    23.63 )-----------( 261      ( 18 Jul 2022 05:42 )             26.1       07-18    136  |  100  |  16  ----------------------------<  104<H>  4.2   |  24  |  0.8    Ca    11.1<H>      18 Jul 2022 05:42  Mg     1.8     07-18    TPro  5.5<L>  /  Alb  3.1<L>  /  TBili  0.3  /  DBili  x   /  AST  31  /  ALT  27  /  AlkPhos  150<H>  07-18    RADIOLOGY IMAGING: reviewed.     ASSESSMENT AND PLAN:    Pt with noted para-aortic lymphadenopathy, IR consulted for tissue biopsy. No safe target on the CT a/p, noted para-aortic LAD is too deep. Findings on CT chest are noted, with potential target in CARO measuring 1.7 cm. Plan for image guided biopsy tomorrow:  - NPO after midnight   - Obtain CBC, CMP, Coags   - Hold ASA, do not start new ap/ac.     Please call Interventional Radiology with questions or concerns:   - M-F [phone]: [phone]   - All other hours: [phone] INTERVENTIONAL RADIOLOGY PROGRESS NOTE      58 y/o F with a pmhx of lupus, hypothyroidism, antiphospholipid syndrome, HTN, anemia who reports to the ER complaining diffuse pelvic pain and back pain x 3 weeks associated with decreased appetite and vomiting over the last 8 days. Patient describes the pain as "achy" that comes and goes, starting 3 weeks ago rating it a 10/10. In the ED, CT A/P showed bilateral adnexal hypodensities, para-aortic lymphadenopathy as well as bilateral pulm nodules, findings are suspicious for underlying metastatic ovarian malignancy/lymphoma.. Patient denies fever, chills, chest pain, shortness of breath, blood in the vomit or stool, unintentional weight loss.  (14 Jul 2022 22:35)    IR consulted for tissue biopsy.     VITALS: T(F): 97.7 (07-18-22 @ 05:10), Max: 98.3 (07-17-22 @ 22:17)  HR: 74 (07-18-22 @ 05:10) (71 - 94)  BP: 110/64 (07-18-22 @ 05:10) (110/64 - 145/72)  RR: 20 (07-18-22 @ 05:10) (18 - 20)  SpO2: --  Wt(kg): --    PHYSICAL EXAM:  Vital Signs Last 24 Hrs  T(C): 36.5 (18 Jul 2022 05:10), Max: 36.8 (17 Jul 2022 22:17)  T(F): 97.7 (18 Jul 2022 05:10), Max: 98.3 (17 Jul 2022 22:17)  HR: 74 (18 Jul 2022 05:10) (71 - 94)  BP: 110/64 (18 Jul 2022 05:10) (110/64 - 145/72)  BP(mean): --  RR: 20 (18 Jul 2022 05:10) (18 - 20)  SpO2: --        LABS:                        8.4    23.63 )-----------( 261      ( 18 Jul 2022 05:42 )             26.1     07-18    136  |  100  |  16  ----------------------------<  104<H>  4.2   |  24  |  0.8    Ca    11.1<H>      18 Jul 2022 05:42  Mg     1.8     07-18    TPro  5.5<L>  /  Alb  3.1<L>  /  TBili  0.3  /  DBili  x   /  AST  31  /  ALT  27  /  AlkPhos  150<H>  07-18                              8.4    23.63 )-----------( 261      ( 18 Jul 2022 05:42 )             26.1       07-18    136  |  100  |  16  ----------------------------<  104<H>  4.2   |  24  |  0.8    Ca    11.1<H>      18 Jul 2022 05:42  Mg     1.8     07-18    TPro  5.5<L>  /  Alb  3.1<L>  /  TBili  0.3  /  DBili  x   /  AST  31  /  ALT  27  /  AlkPhos  150<H>  07-18    RADIOLOGY IMAGING: reviewed.     ASSESSMENT AND PLAN:    Pt with noted para-aortic lymphadenopathy, IR consulted for tissue biopsy. No safe target on the CT a/p, noted para-aortic LAD is too deep. Findings on CT chest are noted, with potential target in CARO measuring 1.7 cm. Plan for image guided biopsy tomorrow:  - NPO after midnight   - Obtain CBC, CMP, Coags   - Hold ASA & lovenox, do not start new ap/ac.     Please call Interventional Radiology with questions or concerns:   - M-F [phone]: [phone]   - All other hours: [phone]

## 2022-07-18 NOTE — PROGRESS NOTE ADULT - SUBJECTIVE AND OBJECTIVE BOX
KELSIE JUAREZ  57y  Female  ***My note supersedes ALL resident notes that I sign.  My corrections for their notes are in my note.***    I can be reached directly on [employer] [phone]. My office number is [phone]. My personal cell number is [phone].    INTERVAL EVENTS: Here for f/u of suspected cancer. Pt has back pain and was asking for pain meds. Pt has occ nausea and vomiting still. Has slight cough.    T(F): 96.7 (07-18-22 @ 13:56), Max: 98.3 (07-17-22 @ 22:17)  HR: 80 (07-18-22 @ 13:56) (74 - 94)  BP: 131/61 (07-18-22 @ 13:56) (110/64 - 135/63)  RR: 18 (07-18-22 @ 13:56) (18 - 20)  SpO2: --    Gen: NAD  HEENT: PERRL, EOMI, mouth clr, nose clr  Neck: no nodes, no JVD, thyroid nl  lungs: clr  hrt: s1 s2 rrr no murmur  abd: soft, ND, mildly tender lower abd; no HS megaly; hard to eval for mass given body habitus  ext: no edema, no c/c  neuro: aa ox3, cn intact, can move all 4 ext    LABS:                      8.4     (    79.3   23.63 )-----------( ---------      261      ( 18 Jul 2022 05:42 )             26.1    (    20.8     WBC Count: 23.63 K/uL (07-18-22 @ 05:42)  WBC Count: 26.77 K/uL (07-17-22 @ 08:55)  WBC Count: 39.23 K/uL (07-16-22 @ 07:29)  WBC Count: 34.36 K/uL (07-15-22 @ 17:59)  WBC Count: 39.35 K/uL (07-14-22 @ 17:12)    Hemoglobin: 8.4 g/dL (07-18 @ 05:42)  Hemoglobin: 8.4 g/dL (07-17 @ 08:55)  Hemoglobin: 9.3 g/dL (07-16 @ 07:29)  Hemoglobin: 8.5 g/dL (07-15 @ 17:59)  Hemoglobin: 9.6 g/dL (07-14 @ 17:12)    136   (   100   (   104      07-18-22 @ 05:42  ----------------------               4.2   (   24   (   16                             -----                        0.8  Ca  11.1   Mg  1.8    P   --     LFT  5.5  (  0.3  (  31       07-18-22 @ 05:42  -------------------------  3.1  (  150  (  27    Culture - Blood (collected 07-15-22 @ 23:17)  Source: .Blood None  Preliminary Report (07-17-22 @ 07:01):    No growth to date.    Culture - Blood (collected 07-15-22 @ 23:17)  Source: .Blood None  Preliminary Report (07-17-22 @ 07:01):    No growth to date.    Culture - Urine (collected 07-14-22 @ 22:50)  Source: Clean Catch Clean Catch (Midstream)  Final Report (07-18-22 @ 12:02):    50,000 - 99,000 CFU/mL Streptococcus agalactiae (Group B) isolated    Group B streptococci are susceptible to ampicillin,    penicillin and cefazolin, but may be resistant to    erythromycin and clindamycin.    Recommendations for intrapartum prophylaxis for Group B    streptococci are penicillin or ampicillin.    RADIOLOGY & ADDITIONAL TESTS:    MEDICATIONS:  hydroxychloroquine 200 milliGRAM(s) Oral two times a day    chlorhexidine 4% Liquid 1 Application(s) Topical <User Schedule>  DULoxetine 60 milliGRAM(s) Oral daily  enoxaparin Injectable 40 milliGRAM(s) SubCutaneous every 24 hours  ferrous    sulfate 325 milliGRAM(s) Oral daily  levothyroxine 150 MICROGram(s) Oral daily  lisinopril 40 milliGRAM(s) Oral daily  melatonin 5 milliGRAM(s) Oral at bedtime  NIFEdipine XL 30 milliGRAM(s) Oral daily  ondansetron Injectable 8 milliGRAM(s) IV Push every 8 hours PRN  oxycodone    5 mG/acetaminophen 325 mG 1 Tablet(s) Oral every 4 hours PRN  sodium chloride 0.9%. 1000 milliLiter(s) IV Continuous <Continuous>

## 2022-07-18 NOTE — PROGRESS NOTE ADULT - SUBJECTIVE AND OBJECTIVE BOX
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    SUBJECTIVE  Hospital Course:     Patient presented to ED with diffuse pelvic pain X3 weeks  associated with nausea, vomiting and lack of appetite X 1 week. Patient being evaluated for suspected malignancy as ED CT A/P demonstrated bilateral adnexal hypodensities, para-aortic lymphadenopathy as well as bilateral pulmonary nodules.     Overnight events:  Patient reported sore throat beginning overnight on 7/17/22 with associated dry cough.    Subjective complaints:  Patient reports pain 2/10 diffuse in hypogastrium. Patient reports cramps after urination but denies pain upon palpation of abdomen. Patient reported her period ended 7/16/22 with a duration of 10 days and increased bleeding upon heparin administration. Patient reports sore throat starting 7/17/22 with associated dry cough denies fever, chills. Patient has reported emesis and nausea and prefers not to eat to prevent future emesis.     Present Today:   - Bermudez:  No [X  ], Yes [   ] : Indication:     - Type of IV Access:       .. CVC/Piccline:  No [ X], Yes [   ] : Indication:       .. Midline: No [X  ], Yes [   ] : Indication:                                                 ----------------------------------------------------------  OBJECTIVE  PAST MEDICAL & SURGICAL HISTORY  Lupus    Hypothyroidism    Iron deficiency anemia    No significant past surgical history                                              -----------------------------------------------------------  ALLERGIES:  NSAIDs (Stomach Upset)                                            ------------------------------------------------------------    HOME MEDICATIONS  Home Medications:  DULoxetine 60 mg oral delayed release capsule: 1 cap(s) orally once a day (14 Jul 2022 22:46)  hydroxychloroquine 200 mg oral tablet: 1 tab(s) orally 2 times a day (14 Jul 2022 22:44)  lisinopril 40 mg oral tablet: 1 tab(s) orally once a day (14 Jul 2022 22:44)  Synthroid 150 mcg (0.15 mg) oral tablet: 1 tab(s) orally once a day (14 Jul 2022 22:46)                           MEDICATIONS:  STANDING MEDICATIONS  chlorhexidine 4% Liquid 1 Application(s) Topical <User Schedule>  DULoxetine 60 milliGRAM(s) Oral daily  enoxaparin Injectable 40 milliGRAM(s) SubCutaneous every 24 hours  ferrous    sulfate 325 milliGRAM(s) Oral daily  hydroxychloroquine 200 milliGRAM(s) Oral two times a day  levothyroxine 150 MICROGram(s) Oral daily  lisinopril 40 milliGRAM(s) Oral daily  procardia xl 30 qd  melatonin 5 milliGRAM(s) Oral at bedtime  NIFEdipine XL 30 milliGRAM(s) Oral daily  sodium chloride 0.9%. 1000 milliLiter(s) IV Continuous <Continuous>    PRN MEDICATIONS  morphine  - Injectable 2 milliGRAM(s) IV Push every 4 hours PRN  ondansetron Injectable 8 milliGRAM(s) IV Push every 8 hours PRN  traMADol 25 milliGRAM(s) Oral two times a day PRN                                            ------------------------------------------------------------  VITAL SIGNS: Last 24 Hours  T(C): 36.5 (18 Jul 2022 05:10), Max: 36.8 (17 Jul 2022 22:17)  T(F): 97.7 (18 Jul 2022 05:10), Max: 98.3 (17 Jul 2022 22:17)  HR: 74 (18 Jul 2022 05:10) (71 - 94)  BP: 110/64 (18 Jul 2022 05:10) (110/64 - 145/72)  BP(mean): --  RR: 20 (18 Jul 2022 05:10) (18 - 20)  SpO2: --                                             --------------------------------------------------------------  LABS:                        8.4    23.63 )-----------( 261      ( 18 Jul 2022 05:42 )             26.1     07-18    136  |  100  |  16  ----------------------------<  104<H>  4.2   |  24  |  0.8    Ca    11.1<H>      18 Jul 2022 05:42  Mg     1.8     07-18    TPro  5.5<L>  /  Alb  3.1<L>  /  TBili  0.3  /  DBili  x   /  AST  31  /  ALT  27  /  AlkPhos  150<H>  07-18                Culture - Blood (collected 15 Jul 2022 23:17)  Source: .Blood None  Preliminary Report (17 Jul 2022 07:01):    No growth to date.    Culture - Blood (collected 15 Jul 2022 23:17)  Source: .Blood None  Preliminary Report (17 Jul 2022 07:01):    No growth to date.                                                    -------------------------------------------------------------  RADIOLOGY:                                            --------------------------------------------------------------    PHYSICAL EXAM:  General: NAD, well appearing, alert interactive  HEENT: erythematous oropharynx, non edematous, no plaques noted  LUNGS: normal respiratory effort, no wheezing, rhonchi, or rales  HEART: normal S1/S2, no murmurs or gallop  ABDOMEN: soft, non distended, non tender to light and deep palpation, normal bowel sounds  EXT: no edema in LE  NEURO: alert and oriented X3   SKIN: intact, no rashes, lesions, erythema                                           --------------------------------------------------------------    ASSESSMENT & PLAN    Past medical history and hospital course       #hypercalcemia  Ca is improving with N/S 100/hr. Currently 11.1 (11.8  corrected calcium level). Continue to monitor but add prednisone or Aredia if patient does not continue to downtrend with levels as hypercalcemia likely due to malignancy. F/U PTHrp. Obtain 25 OH Vitamin D. Check BNP q24.    #suspected malignancy: b/l adnexal lesions; paraortic LN; 15+ pulmonary nodules  b/l adnexal lesions-   CT A/P b/l adnexal hypodensities, para-aortic lymphadenopathy; b/l pulm nodules  TVUS Rt 6.6 cm mixed cystic-solid structure with doppler  : 33; CA 19-9: 19 nl; CEA 5.5; :,   F/U inhibin   IR unable to obtain biopsy in pelvis possible in pulmonary LN    pulm nodules- CT  15+ pulm nodules up to 1.7 cm   F/U IR for biopsy    #marked leukocytosis  WBC is downtrending currently 23.6. This elevation likely due to reactivity to malignancy or lymphoma.   F/U myelodysplastic disorders and mutational analysis BCR/ABL, CALR, MPL, JAK2  F/U lymphoma suspected: flow cytometry   F/U: BM biopsy     #microcytic anemia  Iron is 8.4 continue with ferrous sulfate 325 mg oral daily.   F/U Haptoglobin  F/U MMA  Because reticulocyte count 2.7% and bilirubin not elevated at 0.3 less suspicion for hemolysis.    #Thrombocytosis  Platelet count at 261. Improved, continue to monitor. Likely due to malignancy    #Abnormal LFTS   Likely due to underlying disease. No further evaluation needed now.     # Antiphospholipid Syndrome  Element of Lupus. Hold ASA   No A/C    #Lupus  C/W  mg po q12  C/W cymbalta   C/W tramadol prn    #HTN  C/W lisinopril 40 mg q daily. C/W procardia xl 30 qd. Patient currently 110/64.                                                                                                ----------------------------------------------------  # DVT prophylaxis:   -LMWH 40 mg subQ every 24 hours     # GI prophylaxis: None    # Diet : DASH   -sodium and cholesterol restricted    # Activity Score (AM-PAC)  -patient walks independently     # Code status: FULL    # Disposition   -BM biopsy, F/U IR , Labs, cont IVFs f/u Ca   -set up outpatient Oncology                                                                             --------------------------------------------------------    # Handoff      KELSIE JUAREZ 57y Female  MRN#: [id number]   CODE STATUS: FULL    Hospital Day: 4d    Pt is currently admitted with the primary diagnosis of 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    Hospital Course:     Patient presented to ED with diffuse pelvic pain X3 weeks  associated with nausea, vomiting and lack of appetite X 1 week. Patient being evaluated for suspected malignancy as ED CT A/P demonstrated bilateral adnexal hypodensities, para-aortic lymphadenopathy as well as bilateral pulmonary nodules.     SUBJECTIVE    Overnight events:  Patient reported sore throat beginning overnight on 7/17/22 with associated dry cough.    Subjective complaints:  Patient reports pain 2/10 diffuse in hypogastrium. Patient reports cramps after urination but denies pain upon palpation of abdomen. Patient reported her period ended 7/16/22 with a duration of 10 days and increased bleeding upon heparin administration. Patient reports sore throat starting 7/17/22 with associated dry cough denies fever, chills. Patient has reported emesis and nausea and prefers not to eat to prevent future emesis.     Present Today:   - Bermudez:  No [X  ], Yes [   ] : Indication:     - Type of IV Access:       .. CVC/Piccline:  No [ X], Yes [   ] : Indication:       .. Midline: No [X  ], Yes [   ] : Indication:                                                 ----------------------------------------------------------  OBJECTIVE  PAST MEDICAL & SURGICAL HISTORY  Lupus    Hypothyroidism    Iron deficiency anemia    No significant past surgical history                                              -----------------------------------------------------------  ALLERGIES:  NSAIDs (Stomach Upset)                                            ------------------------------------------------------------    HOME MEDICATIONS  Home Medications:  DULoxetine 60 mg oral delayed release capsule: 1 cap(s) orally once a day (14 Jul 2022 22:46)  hydroxychloroquine 200 mg oral tablet: 1 tab(s) orally 2 times a day (14 Jul 2022 22:44)  lisinopril 40 mg oral tablet: 1 tab(s) orally once a day (14 Jul 2022 22:44)  Synthroid 150 mcg (0.15 mg) oral tablet: 1 tab(s) orally once a day (14 Jul 2022 22:46)                           MEDICATIONS:  STANDING MEDICATIONS  chlorhexidine 4% Liquid 1 Application(s) Topical <User Schedule>  DULoxetine 60 milliGRAM(s) Oral daily  enoxaparin Injectable 40 milliGRAM(s) SubCutaneous every 24 hours  ferrous    sulfate 325 milliGRAM(s) Oral daily  hydroxychloroquine 200 milliGRAM(s) Oral two times a day  levothyroxine 150 MICROGram(s) Oral daily  lisinopril 40 milliGRAM(s) Oral daily  procardia xl 30 qd  melatonin 5 milliGRAM(s) Oral at bedtime  NIFEdipine XL 30 milliGRAM(s) Oral daily  sodium chloride 0.9%. 1000 milliLiter(s) IV Continuous <Continuous>    PRN MEDICATIONS  morphine  - Injectable 2 milliGRAM(s) IV Push every 4 hours PRN  ondansetron Injectable 8 milliGRAM(s) IV Push every 8 hours PRN  traMADol 25 milliGRAM(s) Oral two times a day PRN                                            ------------------------------------------------------------  VITAL SIGNS: Last 24 Hours  T(C): 36.5 (18 Jul 2022 05:10), Max: 36.8 (17 Jul 2022 22:17)  T(F): 97.7 (18 Jul 2022 05:10), Max: 98.3 (17 Jul 2022 22:17)  HR: 74 (18 Jul 2022 05:10) (71 - 94)  BP: 110/64 (18 Jul 2022 05:10) (110/64 - 145/72)  BP(mean): --  RR: 20 (18 Jul 2022 05:10) (18 - 20)  SpO2: --                                             --------------------------------------------------------------  LABS:                        8.4    23.63 )-----------( 261      ( 18 Jul 2022 05:42 )             26.1     07-18    136  |  100  |  16  ----------------------------<  104<H>  4.2   |  24  |  0.8    Ca    11.1<H>      18 Jul 2022 05:42  Mg     1.8     07-18    TPro  5.5<L>  /  Alb  3.1<L>  /  TBili  0.3  /  DBili  x   /  AST  31  /  ALT  27  /  AlkPhos  150<H>  07-18                Culture - Blood (collected 15 Jul 2022 23:17)  Source: .Blood None  Preliminary Report (17 Jul 2022 07:01):    No growth to date.    Culture - Blood (collected 15 Jul 2022 23:17)  Source: .Blood None  Preliminary Report (17 Jul 2022 07:01):    No growth to date.                                                    -------------------------------------------------------------  RADIOLOGY:                                            --------------------------------------------------------------    PHYSICAL EXAM:  General: NAD, well appearing, alert interactive  HEENT: erythematous oropharynx, non edematous, no plaques noted  LUNGS: normal respiratory effort, no wheezing, rhonchi, or rales  HEART: normal S1/S2, no murmurs or gallop  ABDOMEN: soft, non distended, non tender to light and deep palpation, normal bowel sounds  EXT: no edema in LE  NEURO: alert and oriented X3   SKIN: intact, no rashes, lesions, erythema                                           --------------------------------------------------------------    ASSESSMENT & PLAN    Past medical history and hospital course       #hypercalcemia  Ca is improving with N/S 100/hr.   Currently 11.1 (11.8  corrected calcium level).   Continue to monitor but add prednisone or Aredia if patient does not continue to downtrend with levels as hypercalcemia likely due to malignancy.   F/U PTHrp.   Obtain 25 OH Vitamin D.   Check BNP q24.    #suspected malignancy: b/l adnexal lesions; paraortic LN; 15+ pulmonary nodules  b/l adnexal lesions-   CT A/P b/l adnexal hypodensities, para-aortic lymphadenopathy; b/l pulm nodules  TVUS Rt 6.6 cm mixed cystic-solid structure with doppler  : 33; CA 19-9: 19 nl; CEA 5.5; :,   F/U inhibin   IR unable to obtain biopsy in pelvis possible in pulmonary LN    pulm nodules- CT  15+ pulm nodules up to 1.7 cm   F/U IR for biopsy    #marked leukocytosis  WBC is downtrending currently 23.6. This elevation likely due to reactivity to malignancy or lymphoma.   F/U myelodysplastic disorders and mutational analysis BCR/ABL, CALR, MPL, JAK2  F/U lymphoma suspected: flow cytometry   F/U: BM biopsy     #microcytic anemia  Iron is 8.4 continue with ferrous sulfate 325 mg oral daily.   F/U Haptoglobin  F/U MMA  Because reticulocyte count 2.7% and bilirubin not elevated at 0.3 less suspicion for hemolysis.    #Thrombocytosis  Platelet count at 261. Improved, continue to monitor. Likely due to malignancy    #Abnormal LFTS   Likely due to underlying disease. No further evaluation needed now.     # Antiphospholipid Syndrome  Element of Lupus. Hold ASA   No A/C    #Lupus  C/W  mg po q12  C/W cymbalta   C/W tramadol prn    #HTN  C/W lisinopril 40 mg q daily. C/W procardia xl 30 qd. Patient currently 110/64.                                                                                                ----------------------------------------------------  # DVT prophylaxis:   -LMWH 40 mg subQ every 24 hours     # GI prophylaxis: None    # Diet : DASH   -sodium and cholesterol restricted    # Activity Score (AM-PAC)  -patient walks independently     # Code status: FULL    # Disposition   -BM biopsy, F/U IR , Labs, cont IVFs f/u Ca   -set up outpatient Oncology                                                                             --------------------------------------------------------    # Handoff      KELSIE JUAREZ 57y Female  MRN#: [id number]   CODE STATUS: FULL    Hospital Day: 4d    Pt is currently admitted with the primary diagnosis of 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    Hospital Course:     Patient presented to ED with diffuse pelvic pain X3 weeks  associated with nausea, vomiting and lack of appetite X 1 week. Patient being evaluated for suspected malignancy as ED CT A/P demonstrated bilateral adnexal hypodensities, para-aortic lymphadenopathy as well as bilateral pulmonary nodules.     SUBJECTIVE    Overnight events:  Patient reported sore throat beginning overnight on 7/17/22 with associated dry cough.    Subjective complaints:  Patient reports pain 2/10 diffuse in hypogastrium. Patient reports cramps after urination but denies pain upon palpation of abdomen. Patient reported her period ended 7/16/22 with a duration of 10 days and increased bleeding upon heparin administration. Patient reports sore throat starting 7/17/22 with associated dry cough denies fever, chills. Patient has reported emesis and nausea and prefers not to eat to prevent future emesis.     Present Today:   - Bermudez:  No [X  ], Yes [   ] : Indication:     - Type of IV Access:       .. CVC/Piccline:  No [ X], Yes [   ] : Indication:       .. Midline: No [X  ], Yes [   ] : Indication:                                                 ----------------------------------------------------------  OBJECTIVE  PAST MEDICAL & SURGICAL HISTORY  Lupus    Hypothyroidism    Iron deficiency anemia    No significant past surgical history                                              -----------------------------------------------------------  ALLERGIES:  NSAIDs (Stomach Upset)                                            ------------------------------------------------------------    HOME MEDICATIONS  Home Medications:  DULoxetine 60 mg oral delayed release capsule: 1 cap(s) orally once a day (14 Jul 2022 22:46)  hydroxychloroquine 200 mg oral tablet: 1 tab(s) orally 2 times a day (14 Jul 2022 22:44)  lisinopril 40 mg oral tablet: 1 tab(s) orally once a day (14 Jul 2022 22:44)  Synthroid 150 mcg (0.15 mg) oral tablet: 1 tab(s) orally once a day (14 Jul 2022 22:46)                           MEDICATIONS:  STANDING MEDICATIONS  chlorhexidine 4% Liquid 1 Application(s) Topical <User Schedule>  DULoxetine 60 milliGRAM(s) Oral daily  enoxaparin Injectable 40 milliGRAM(s) SubCutaneous every 24 hours  ferrous    sulfate 325 milliGRAM(s) Oral daily  hydroxychloroquine 200 milliGRAM(s) Oral two times a day  levothyroxine 150 MICROGram(s) Oral daily  lisinopril 40 milliGRAM(s) Oral daily  procardia xl 30 qd  melatonin 5 milliGRAM(s) Oral at bedtime  NIFEdipine XL 30 milliGRAM(s) Oral daily  sodium chloride 0.9%. 1000 milliLiter(s) IV Continuous <Continuous>    PRN MEDICATIONS  morphine  - Injectable 2 milliGRAM(s) IV Push every 4 hours PRN  ondansetron Injectable 8 milliGRAM(s) IV Push every 8 hours PRN  traMADol 25 milliGRAM(s) Oral two times a day PRN                                            ------------------------------------------------------------  VITAL SIGNS: Last 24 Hours  T(C): 36.5 (18 Jul 2022 05:10), Max: 36.8 (17 Jul 2022 22:17)  T(F): 97.7 (18 Jul 2022 05:10), Max: 98.3 (17 Jul 2022 22:17)  HR: 74 (18 Jul 2022 05:10) (71 - 94)  BP: 110/64 (18 Jul 2022 05:10) (110/64 - 145/72)  BP(mean): --  RR: 20 (18 Jul 2022 05:10) (18 - 20)  SpO2: --                                             --------------------------------------------------------------  LABS:                        8.4    23.63 )-----------( 261      ( 18 Jul 2022 05:42 )             26.1     07-18    136  |  100  |  16  ----------------------------<  104<H>  4.2   |  24  |  0.8    Ca    11.1<H>      18 Jul 2022 05:42  Mg     1.8     07-18    TPro  5.5<L>  /  Alb  3.1<L>  /  TBili  0.3  /  DBili  x   /  AST  31  /  ALT  27  /  AlkPhos  150<H>  07-18                Culture - Blood (collected 15 Jul 2022 23:17)  Source: .Blood None  Preliminary Report (17 Jul 2022 07:01):    No growth to date.    Culture - Blood (collected 15 Jul 2022 23:17)  Source: .Blood None  Preliminary Report (17 Jul 2022 07:01):    No growth to date.                                                    -------------------------------------------------------------  RADIOLOGY:    US Pelvis 7/15/22 8:29 PM  -Ovaries not well delineated. Lower pelvic 5.6 cm heterogenous solid appearing structure. Right pelvic 6.6 cm heterogenous mixed solid-cystic structure  CT Chest with IV Contrast 7/15/22  -Multiple Bilateral solid pulmonary nodules measuring 1.7 cm at left upper lobe, 1.5 cm at left lower lobe and 1 cm at right upper lobe.   CT Abdomen 7/14/22   -Multiple scattered bilateral solid pulmonary nodules, largest 8 mm in right middle lobe.    -Diffuse rounded para-aortic lymphadenopathy, measuring 1.7 cm.  -Confluent hypodense lesions are visualized bilateral adnexa.                                            --------------------------------------------------------------    PHYSICAL EXAM:  General: NAD, well appearing, alert interactive  HEENT: erythematous oropharynx, non edematous, no plaques noted  LUNGS: normal respiratory effort, no wheezing, rhonchi, or rales  HEART: normal S1/S2, no murmurs or gallop  ABDOMEN: soft, non distended, non tender to light and deep palpation, normal bowel sounds  EXT: no edema in LE  NEURO: alert and oriented X3   SKIN: intact, no rashes, lesions, erythema                                           --------------------------------------------------------------    ASSESSMENT & PLAN    Past medical history and hospital course       #hypercalcemia  Ca is improving with N/S 100/hr.   Currently 11.1 (11.8  corrected calcium level).   Continue to monitor but add prednisone or Aredia if patient does not continue to downtrend with levels as hypercalcemia likely due to malignancy.   F/U PTHrp.   Obtain 25 OH Vitamin D.   Check BNP q24.    #suspected malignancy: b/l adnexal lesions; paraortic LN; 15+ pulmonary nodules  b/l adnexal lesions-   CT A/P b/l adnexal hypodensities, para-aortic lymphadenopathy; b/l pulm nodules  TVUS Rt 6.6 cm mixed cystic-solid structure with doppler  : 33; CA 19-9: 19 nl; CEA 5.5; :,   F/U inhibin   IR unable to obtain biopsy in pelvis possible in pulmonary LN    pulm nodules- CT  15+ pulm nodules up to 1.7 cm   F/U IR for biopsy    #marked leukocytosis  WBC is downtrending currently 23.6. This elevation likely due to reactivity to malignancy or lymphoma.   F/U myelodysplastic disorders and mutational analysis BCR/ABL, CALR, MPL, JAK2  F/U lymphoma suspected: flow cytometry   F/U: BM biopsy     #microcytic anemia  Iron is 8.4 continue with ferrous sulfate 325 mg oral daily.   F/U Haptoglobin  F/U MMA  Because reticulocyte count 2.7% and bilirubin not elevated at 0.3 less suspicion for hemolysis.    #Thrombocytosis  Platelet count at 261. Improved, continue to monitor. Likely due to malignancy    #Abnormal LFTS   Likely due to underlying disease. No further evaluation needed now.     # Antiphospholipid Syndrome  Element of Lupus. Hold ASA   No A/C    #Lupus  C/W  mg po q12  C/W cymbalta   C/W tramadol prn    #HTN  C/W lisinopril 40 mg q daily. C/W procardia xl 30 qd. Patient currently 110/64.                                                                                                ----------------------------------------------------  # DVT prophylaxis:   -LMWH 40 mg subQ every 24 hours     # GI prophylaxis: None    # Diet : DASH   -sodium and cholesterol restricted    # Activity Score (AM-PAC)  -patient walks independently     # Code status: FULL    # Disposition   -BM biopsy, F/U IR , Labs, cont IVFs f/u Ca   -set up outpatient Oncology                                                                             --------------------------------------------------------    # Handoff

## 2022-07-18 NOTE — PROGRESS NOTE ADULT - REASON FOR ADMISSION
diffuse pelvic pain associated with nausea and vomitting diffuse pelvic pain associated with nausea and vomiting

## 2022-07-18 NOTE — PROGRESS NOTE ADULT - ASSESSMENT
Pt is a 56 y/o F with PMHx of lupus, hypothyroidism, antiphospholipid syndrome, HTN, and anemia who presented to the ER complaining of diffuse pelvic pain and back pain x 3 weeks associated with decreased appetite and vomiting over the last 8 days.    # hypercalcemia - this was likely the cause of the vomiting and decr appetite  iPTH < 20 -> hyperpara RULED OUT  likely malign in origin - prob lymphoma, which would make it amenable to steroids  zofran 4mg iv q6 prn n/v  Ca is improving w/ fluids  IVFs: /hr + oral hydration  will hold off on Aredia as pt responding to fluids  if Ca < 11.5 (corrected), she can safely have sedation/anesth to obtain tissue bx  highly unlikely to be sarcoid  f/u PTHrP  f/u 25 OH Vit D  BMP q24    # suspect malignancy: b/l adnexal lesions; paraaortic LN; 15+ pulm nodules;   CT C: 15+ pulm nod up to 1.7cm - IR will bx héctor: NPO p MN; obtain PT/PTT (remember pt has APHAB, so PTT will likely be prolonged -> pt can still go for bx as she is more likely to clot than bleed w/ these Abs)  CT A/P: bilateral adnexal hypodensities, para-aortic lymphadenopathy as well as bilateral pulm nodules  TVUS: Rt 6.6cm mixed solid-cystic structure; Lt 5.6 cm heterog, solid structure w/ doppler flow   H/O noted  Gyn/Onc noted  : 33 (nl); CA 19-9: 19 (nl); CEA 5.5; ; HCG < 0.6; S preg neg; ESR 80; uric acid 6.7  Inhibin A/B: pending  percocet 5/325mg po q4 prn pain    # marked leukocytosis  prob reactive and concentrated   bld cx neg x2  Ucx Grp B strp 50-99K, but seems asymp and WBC improving w/out abx: cont OFF abx  f/u BCR/ABL; CALR; MPL mut analysis; JAK2; flow cyto   consider BM Bx    # microcytic anemia  prob of malign  iron sat 10%, TIBC 314, Ferr 85 = could be iron def  no IV iron as we cont to r/o infection  FeSO4 325mg po q24  B12, Fol nl  hapto 262  f/u MMA  retic 2.7%; t red 0.3   - this is NOT hemolysis, even intramedullary; I suspect this is a marker for lymphoma    # Thrombocytosis   likely reactive; improved to nl; down from 803 w/ fluid    # abnl LFTs mild (minor incr AP)  prob related to underlying issue above  no further w/u for now    # antiphospholipid syndrome  prob just element of lupus hx  no clots; only 1 miscarriage  hold asa  no a/c    # lupus - does not seem active currently  c/w HCQ 200mg po q12  c/w cymbalta  c/w tramadol prn    # HTN  c/w lisinopril 40mg oral daily  c/w procardia xl 30 qd     # Hypothyroidism  c/w levothyroxine 150mg oral daily  check TSH    # DVT ppx: LMWH (hold in am for bx)    # GI ppx: none    # Activity: can walk on her own    # Code: full    Dispo: need tissue dx - f/u IR; NPO p MN; f/u PT/PTT; f/u H/O and G/O; f/u labs above; IVFs; f/u Ca; tx pain and n/v  eventually, pt will go home - not ready for d/c     Pt is a 58 y/o F with PMHx of lupus, hypothyroidism, antiphospholipid syndrome, HTN, and anemia who presented to the ER complaining of diffuse pelvic pain and back pain x 3 weeks associated with decreased appetite and vomiting over the last 8 days.    # COVID exposed 7/16  so far COVID neg  full isolation for now  f/u infection control for length of quarantine and next COVID testing    # hypercalcemia - this was likely the cause of the vomiting and decr appetite  iPTH < 20 -> hyperpara RULED OUT  likely malign in origin - prob lymphoma, which would make it amenable to steroids  zofran 4mg iv q6 prn n/v  Ca is improving w/ fluids  IVFs: /hr + oral hydration  will hold off on Aredia as pt responding to fluids  if Ca < 11.5 (corrected), she can safely have sedation/anesth to obtain tissue bx  highly unlikely to be sarcoid  f/u PTHrP  f/u 25 OH Vit D  BMP q24    # suspect malignancy: b/l adnexal lesions; paraaortic LN; 15+ pulm nodules;   CT C: 15+ pulm nod up to 1.7cm - IR will bx héctor: NPO p MN; obtain PT/PTT (remember pt has APHAB, so PTT will likely be prolonged -> pt can still go for bx as she is more likely to clot than bleed w/ these Abs)  CT A/P: bilateral adnexal hypodensities, para-aortic lymphadenopathy as well as bilateral pulm nodules  TVUS: Rt 6.6cm mixed solid-cystic structure; Lt 5.6 cm heterog, solid structure w/ doppler flow   H/O noted  Gyn/Onc noted  : 33 (nl); CA 19-9: 19 (nl); CEA 5.5; ; HCG < 0.6; S preg neg; ESR 80; uric acid 6.7  Inhibin A/B: pending  percocet 5/325mg po q4 prn pain    # marked leukocytosis  prob reactive and concentrated   bld cx neg x2  Ucx Grp B strp 50-99K, but seems asymp and WBC improving w/out abx: cont OFF abx  f/u BCR/ABL; CALR; MPL mut analysis; JAK2; flow cyto   consider BM Bx    # microcytic anemia  prob of malign  iron sat 10%, TIBC 314, Ferr 85 = could be iron def  no IV iron as we cont to r/o infection  FeSO4 325mg po q24  B12, Fol nl  hapto 262  f/u MMA  retic 2.7%; t red 0.3   - this is NOT hemolysis, even intramedullary; I suspect this is a marker for lymphoma    # Thrombocytosis   likely reactive; improved to nl; down from 803 w/ fluid    # abnl LFTs mild (minor incr AP)  prob related to underlying issue above  no further w/u for now    # antiphospholipid syndrome  prob just element of lupus hx  no clots; only 1 miscarriage  hold asa  no a/c    # lupus - does not seem active currently  c/w HCQ 200mg po q12  c/w cymbalta  c/w tramadol prn    # HTN  c/w lisinopril 40mg oral daily  c/w procardia xl 30 qd     # Hypothyroidism  c/w levothyroxine 150mg oral daily  check TSH    # DVT ppx: LMWH (hold in am for bx)    # GI ppx: none    # Activity: can walk on her own    # Code: full    Dispo: need tissue dx - f/u IR; NPO p MN; f/u PT/PTT; f/u H/O and G/O; f/u labs above; IVFs; f/u Ca; tx pain and n/v  eventually, pt will go home - not ready for d/c     Pt is a 56 y/o F with PMHx of lupus, hypothyroidism, antiphospholipid syndrome, HTN, and anemia who presented to the ER complaining of diffuse pelvic pain and back pain x 3 weeks associated with decreased appetite and vomiting over the last 8 days.    # COVID exposed 7/16  so far COVID neg  full isolation for now  f/u infection control for length of quarantine and next COVID testing    # hypercalcemia - this was likely the cause of the vomiting and decr appetite  iPTH < 20 -> hyperpara RULED OUT  likely malign in origin - prob lymphoma, which would make it amenable to steroids  zofran 4mg iv q6 prn n/v  Ca is improving w/ fluids  IVFs: /hr + oral hydration  will hold off on Aredia as pt responding to fluids  if Ca < 11.5 (corrected), she can safely have sedation/anesth to obtain tissue bx  highly unlikely to be sarcoid - no need to check 1,25 diOH Vit D -> would just wait on bx results  f/u PTHrP  f/u 25 OH Vit D  BMP q24    # suspect malignancy: b/l adnexal lesions; paraaortic LN; 15+ pulm nodules; NO B symptoms  CT C: 15+ pulm nod up to 1.7cm - IR will bx héctor: NPO p MN; obtain PT/PTT (remember pt has APHAB, so PTT will likely be prolonged -> pt can still go for bx as she is more likely to clot than bleed w/ these Abs)  CT A/P: bilateral adnexal hypodensities, para-aortic lymphadenopathy as well as bilateral pulm nodules  TVUS: Rt 6.6cm mixed solid-cystic structure; Lt 5.6 cm heterog, solid structure w/ doppler flow   H/O noted  Gyn/Onc noted  : 33 (nl); CA 19-9: 19 (nl); CEA 5.5; ; HCG < 0.6; S preg neg; ESR 80; uric acid 6.7  Inhibin A/B: pending  percocet 5/325mg po q4 prn pain    # marked leukocytosis  prob reactive and concentrated   bld cx neg x2  Ucx Grp B strp 50-99K, but seems asymp and WBC improving w/out abx: cont OFF abx  f/u BCR/ABL; CALR; MPL mut analysis; JAK2; flow cyto   consider BM Bx    # microcytic anemia  prob of malign  iron sat 10%, TIBC 314, Ferr 85 = could be iron def  no IV iron as we cont to r/o infection  FeSO4 325mg po q24  B12, Fol nl  hapto 262  f/u MMA  retic 2.7%; t red 0.3   - this is NOT hemolysis, even intramedullary; I suspect this is a marker for lymphoma    # Thrombocytosis   likely reactive; improved to nl; down from 803 w/ fluid    # abnl LFTs mild (minor incr AP)  prob related to underlying issue above  no further w/u for now    # antiphospholipid syndrome  prob just element of lupus hx  no clots; only 1 miscarriage  hold asa  no a/c    # lupus - does not seem active currently  c/w HCQ 200mg po q12  c/w cymbalta  c/w tramadol prn    # HTN  c/w lisinopril 40mg oral daily  c/w procardia xl 30 qd     # Hypothyroidism  c/w levothyroxine 150mg oral daily  check TSH    # DVT ppx: LMWH (hold in am for bx)    # GI ppx: none    # Activity: can walk on her own    # Code: full    Dispo: need tissue dx - f/u IR; NPO p MN; f/u PT/PTT; f/u H/O and G/O; f/u labs above; IVFs; f/u Ca; tx pain and n/v  eventually, pt will go home - not ready for d/c

## 2022-07-18 NOTE — PROGRESS NOTE ADULT - SUBJECTIVE AND OBJECTIVE BOX
KELSIE JUAREZ 57y Female  MRN#: [id number]     SUBJECTIVE  Patient is a 57y old Female who presents with a chief complaint of diffuse pelvic pain associated with nausea and vomiting (18 Jul 2022 11:00)      Today is hospital day 4d, and this morning she is lying in bed without distress.   No acute overnight events.     OBJECTIVE  PAST MEDICAL & SURGICAL HISTORY  Lupus    Hypothyroidism    Iron deficiency anemia    No significant past surgical history      ALLERGIES:  NSAIDs (Stomach Upset)    MEDICATIONS:  STANDING MEDICATIONS  chlorhexidine 4% Liquid 1 Application(s) Topical <User Schedule>  DULoxetine 60 milliGRAM(s) Oral daily  enoxaparin Injectable 40 milliGRAM(s) SubCutaneous every 24 hours  ferrous    sulfate 325 milliGRAM(s) Oral daily  hydroxychloroquine 200 milliGRAM(s) Oral two times a day  levothyroxine 150 MICROGram(s) Oral daily  lisinopril 40 milliGRAM(s) Oral daily  melatonin 5 milliGRAM(s) Oral at bedtime  NIFEdipine XL 30 milliGRAM(s) Oral daily  sodium chloride 0.9%. 1000 milliLiter(s) IV Continuous <Continuous>    PRN MEDICATIONS  ondansetron Injectable 8 milliGRAM(s) IV Push every 8 hours PRN  oxycodone    5 mG/acetaminophen 325 mG 1 Tablet(s) Oral every 4 hours PRN    HOME MEDICATIONS  Home Medications:  DULoxetine 60 mg oral delayed release capsule: 1 cap(s) orally once a day (14 Jul 2022 22:46)  hydroxychloroquine 200 mg oral tablet: 1 tab(s) orally 2 times a day (14 Jul 2022 22:44)  lisinopril 40 mg oral tablet: 1 tab(s) orally once a day (14 Jul 2022 22:44)  Synthroid 150 mcg (0.15 mg) oral tablet: 1 tab(s) orally once a day (14 Jul 2022 22:46)      VITAL SIGNS: Last 24 Hours  T(C): 35.9 (18 Jul 2022 13:56), Max: 36.8 (17 Jul 2022 22:17)  T(F): 96.7 (18 Jul 2022 13:56), Max: 98.3 (17 Jul 2022 22:17)  HR: 80 (18 Jul 2022 13:56) (74 - 94)  BP: 131/61 (18 Jul 2022 13:56) (110/64 - 135/63)  BP(mean): --  RR: 18 (18 Jul 2022 13:56) (18 - 20)  SpO2: --      LABS:                        8.4    23.63 )-----------( 261      ( 18 Jul 2022 05:42 )             26.1     07-18    136  |  100  |  16  ----------------------------<  104<H>  4.2   |  24  |  0.8    Ca    11.1<H>      18 Jul 2022 05:42  Mg     1.8     07-18    TPro  5.5<L>  /  Alb  3.1<L>  /  TBili  0.3  /  DBili  x   /  AST  31  /  ALT  27  /  AlkPhos  150<H>  07-18    LIVER FUNCTIONS - ( 18 Jul 2022 05:42 )  Alb: 3.1 g/dL / Pro: 5.5 g/dL / ALK PHOS: 150 U/L / ALT: 27 U/L / AST: 31 U/L / GGT: x                     Culture - Blood (collected 15 Jul 2022 23:17)  Source: .Blood None  Preliminary Report (17 Jul 2022 07:01):    No growth to date.    Culture - Blood (collected 15 Jul 2022 23:17)  Source: .Blood None  Preliminary Report (17 Jul 2022 07:01):    No growth to date.          CAPILLARY BLOOD GLUCOSE          RADIOLOGY:  US transvaginal    IMPRESSION:  Right pelvic 6.6 cm heterogeneous mixed solid-cystic structure; may   represent ovarian mass.  Lower pelvic 5.6 cm heterogeneous, solid appearing structure without   appreciable Doppler flow; may represent additional ovarian mass or   degenerating fibroid.      PHYSICAL EXAM:  PHYSICAL EXAM:  GENERAL: NAD, AAO x 4, 57y F  HEAD:  Atraumatic, Normocephalic  EYES: EOMI, conjunctivae clear and sclerae white  NECK: Supple, No JVD  CHEST/LUNG: Clear to auscultation bilaterally; No wheeze; No crackles; No accessory muscles used  HEART: Regular rate and rhythm; No murmurs;   ABDOMEN: Soft, Nontender, Nondistended; Bowel sounds present; No guarding  EXTREMITIES:  2+ Peripheral Pulses, No cyanosis or edema  NEUROLOGY: non-focal    ADMISSION SUMMARY  Patient is a 57y old Female who presents with a chief complaint of diffuse pelvic pain associated with nausea and vomiting (18 Jul 2022 11:00)

## 2022-07-18 NOTE — PROGRESS NOTE ADULT - ASSESSMENT
Patient is a 56 y/o F with a pmhx of lupus, hypothyroidism, antiphospholipid syndrome, HTN, anemia who reports to the ER complaining diffuse pelvic pain and back pain x 3 weeks associated with decreased appetite and vomiting over the last 8 days.  Hematology and oncology was consulted due to bilateral adnexal masses , multiple pulmonary nodules with paraaortic lymphadenopathy and leukocytosis and thrombocytosis.    Leukocytosis and thrombocytosis R/o myeloproliferative disorder: Possibly reactive to the mass and/or autoimmune disease  - peripheral flow, check BCR-ABL, Jak2, CALR, MPL sent.   - awaiting results of flow cytometry. would defer bone marrow biopsy for now.     Bilateral adnexal masses with para-aortic lymphadenopathy and pulmonary nodules .  -ca 19-9, ca125 normal  -CEA 5.5  -US transvaginal Right pelvic 6.6 cm heterogeneous mixed solid-cystic structure; may represent ovarian mass. Lower pelvic 5.6 cm heterogeneous, solid appearing structure without   appreciable Doppler flow; may represent additional ovarian mass or degenerating fibroid.  - IR planned to got for biopsy of left upper lobe of lung tomorrow  - f/u results of IR guided biopsy      Microcytic anemia 2/2 Iron deficiency anemia  - TIBC 314  -Total iron 31.  -ferritin 85  -iron saturation 10  - haptoglobin 262:   -Direct mitchell negative    Plan  - consider venofer daily for 5 days  for iron deficiency anemia if they are no contraindications.     Hx of + APL abs  - on ASA per rheumatology, would hold ASA for now for pending bx; no hx of clots or recurrent miscarriages.    Hypercalcemia most likely 2/2 to malignancy vs sarcoidosis  -on Iv hydration  -Vitamin d normal  -PTH normal  -Please send 1, 25 dihydroxy vitamin d to r/o hypercalcemia caused by sarcoidosis.      Plan  -awaiting  results of flow cytometry.  -IR to proceed with CARO mass biopsy tomorrow.  - consider venofer daily for 5 days  for iron deficiency anemia if they are no contraindications.  -would defer Bone marrow biopsy for now. The suspicion of lymphoma is low as wbc count is showing neutrophilic predominance and diagnostic yield of bone marrow for lymphoma is low.    Attending recs to follow Patient is a 56 y/o F with a pmhx of lupus, hypothyroidism, antiphospholipid syndrome, HTN, anemia who reports to the ER complaining diffuse pelvic pain and back pain x 3 weeks associated with decreased appetite and vomiting over the last 8 days.  Hematology and oncology was consulted due to bilateral adnexal masses , multiple pulmonary nodules with paraaortic lymphadenopathy and leukocytosis and thrombocytosis.    Leukocytosis and thrombocytosis R/o myeloproliferative disorder: Possibly reactive to the mass and/or autoimmune disease  - peripheral flow, check BCR-ABL, Jak2, CALR, MPL sent.   - awaiting results of flow cytometry. would defer bone marrow biopsy for now.     Bilateral adnexal masses with para-aortic lymphadenopathy and pulmonary nodules .  -ca 19-9, ca125 normal  -CEA 5.5  -US transvaginal Right pelvic 6.6 cm heterogeneous mixed solid-cystic structure; may represent ovarian mass. Lower pelvic 5.6 cm heterogeneous, solid appearing structure without   appreciable Doppler flow; may represent additional ovarian mass or degenerating fibroid.  - IR planned to got for biopsy of left upper lobe of lung tomorrow  - f/u results of IR guided biopsy      Microcytic anemia 2/2 Iron deficiency anemia  - TIBC 314  -Total iron 31.  -ferritin 85  -iron saturation 10  - haptoglobin 262:   -Direct mitchell negative    Plan  - consider venofer daily for 5 days  for iron deficiency anemia if they are no contraindications.     Hx of + APL abs  - on ASA per rheumatology, would hold ASA for now for pending bx; no hx of clots or recurrent miscarriages.    Hypercalcemia most likely 2/2 to malignancy vs sarcoidosis  -on Iv hydration  -Vitamin d normal  -PTH normal  -Please send 1, 25 dihydroxy vitamin d to r/o hypercalcemia caused by sarcoidosis.      Plan  -awaiting  results of flow cytometry.  -IR to proceed with CARO mass biopsy tomorrow.  - consider venofer daily for 5 days  for iron deficiency anemia if they are no contraindications.  -would defer Bone marrow biopsy for now. The suspicion of lymphoma is low as wbc count is showing neutrophilic predominance and diagnostic yield of bone marrow for lymphoma is low.

## 2022-07-19 NOTE — PROGRESS NOTE ADULT - SUBJECTIVE AND OBJECTIVE BOX
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    Hospital Course:     Patient presented to ED with diffuse pelvic pain X3 weeks  associated with nausea, vomiting and lack of appetite X 1 week. Patient being evaluated for suspected malignancy as ED CT A/P demonstrated bilateral adnexal hypodensities, para-aortic lymphadenopathy as well as bilateral pulmonary nodules.     SUBJECTIVE    Overnight events:  Patient reported sore throat beginning overnight on 7/17/22 with associated dry cough.    Subjective complaints:  Patient reports pain 2/10 diffuse in hypogastrium. Patient reports cramps after urination but denies pain upon palpation of abdomen. Patient reported her period ended 7/16/22 with a duration of 10 days and increased bleeding upon heparin administration. Patient reports sore throat starting 7/17/22 with associated dry cough denies fever, chills. Patient has reported emesis and nausea and prefers not to eat to prevent future emesis. Patient NPO since midnight as patient undergoing LN biopsy today with IR 7/19/22. Patient admits to nausea and vomiting since 7/18/22.     Present Today:   - Bermudez:  No [X  ], Yes [   ] : Indication:     - Type of IV Access:       .. CVC/Piccline:  No [ X], Yes [   ] : Indication:       .. Midline: No [X  ], Yes [   ] : Indication:                                                 ----------------------------------------------------------  OBJECTIVE  PAST MEDICAL & SURGICAL HISTORY  Lupus    Hypothyroidism    Iron deficiency anemia    No significant past surgical history                                              -----------------------------------------------------------  ALLERGIES:  NSAIDs (Stomach Upset)                                            ------------------------------------------------------------    HOME MEDICATIONS  Home Medications:  DULoxetine 60 mg oral delayed release capsule: 1 cap(s) orally once a day (14 Jul 2022 22:46)  hydroxychloroquine 200 mg oral tablet: 1 tab(s) orally 2 times a day (14 Jul 2022 22:44)  lisinopril 40 mg oral tablet: 1 tab(s) orally once a day (14 Jul 2022 22:44)  Synthroid 150 mcg (0.15 mg) oral tablet: 1 tab(s) orally once a day (14 Jul 2022 22:46)                           MEDICATIONS:  STANDING MEDICATIONS  chlorhexidine 4% Liquid 1 Application(s) Topical <User Schedule>  DULoxetine 60 milliGRAM(s) Oral daily  enoxaparin Injectable 40 milliGRAM(s) SubCutaneous every 24 hours  ferrous    sulfate 325 milliGRAM(s) Oral daily  hydroxychloroquine 200 milliGRAM(s) Oral two times a day  levothyroxine 150 MICROGram(s) Oral daily  lisinopril 40 milliGRAM(s) Oral daily  melatonin 5 milliGRAM(s) Oral at bedtime  NIFEdipine XL 30 milliGRAM(s) Oral daily  sodium chloride 0.9%. 1000 milliLiter(s) IV Continuous <Continuous>    PRN MEDICATIONS  morphine  - Injectable 2 milliGRAM(s) IV Push every 4 hours PRN  ondansetron Injectable 8 milliGRAM(s) IV Push every 8 hours PRN  traMADol 25 milliGRAM(s) Oral two times a day PRN                                            ------------------------------------------------------------  VITAL SIGNS: Last 24 Hours                                                      -------------------------------------------------------------  RADIOLOGY:    US Pelvis 7/15/22 8:29 PM  -Ovaries not well delineated. Lower pelvic 5.6 cm heterogenous solid appearing structure. Right pelvic 6.6 cm heterogenous mixed solid-cystic structure  CT Chest with IV Contrast 7/15/22  -Multiple Bilateral solid pulmonary nodules measuring 1.7 cm at left upper lobe, 1.5 cm at left lower lobe and 1 cm at right upper lobe.   CT Abdomen 7/14/22   -Multiple scattered bilateral solid pulmonary nodules, largest 8 mm in right middle lobe.    -Diffuse rounded para-aortic lymphadenopathy, measuring 1.7 cm.  -Confluent hypodense lesions are visualized bilateral adnexa.                                            --------------------------------------------------------------    PHYSICAL EXAM:  General: NAD, well appearing, alert interactive  HEENT: erythematous oropharynx, non edematous, no plaques noted  LUNGS: normal respiratory effort, no wheezing, rhonchi, or rales  HEART: normal S1/S2, no murmurs or gallop  ABDOMEN: soft, non distended, non tender to light and deep palpation, normal bowel sounds  EXT: no edema in LE  NEURO: alert and oriented X3   SKIN: intact, no rashes, lesions, erythema                                           --------------------------------------------------------------    ASSESSMENT & PLAN    Past medical history and hospital course       #hypercalcemia  Ca is improving with N/S 100/hr.   Currently 11.1 (11.8  corrected calcium level).   Continue to monitor but add prednisone or Aredia if patient does not continue to downtrend with levels as hypercalcemia likely due to malignancy.   F/U Labs  F/U PTHrp.   Obtain 25 OH Vitamin D.   Check BNP q24.    #suspected malignancy: b/l adnexal lesions; paraortic LN; 15+ pulmonary nodules  b/l adnexal lesions-   Patient going for biopsy of LN today 7/19/22  F/U pathology for dx  CT A/P b/l adnexal hypodensities, para-aortic lymphadenopathy; b/l pulm nodules  TVUS Rt 6.6 cm mixed cystic-solid structure with doppler  : 33; CA 19-9: 19 nl; CEA 5.5; :,   F/U inhibin   IR unable to obtain biopsy in pelvis possible in pulmonary LN    pulm nodules- CT  15+ pulm nodules up to 1.7 cm   F/U IR for biopsy  F/U pathology    #marked leukocytosis  WBC has increased to 28 after previous downtrending. This elevation likely due to reactivity to malignancy or lymphoma. Continue to monitor.  F/U myelodysplastic disorders and mutational analysis BCR/ABL, CALR, MPL, JAK2  F/U lymphoma suspected: flow cytometry   F/U: BM biopsy held at moment until biopsy results finalize    #microcytic anemia  Iron is 8.4 continue with ferrous sulfate 325 mg oral daily.   F/U Haptoglobin  F/U MMA  Because reticulocyte count 2.7% and bilirubin not elevated at 0.3 less suspicion for hemolysis.    #Thrombocytosis  Platelet count at 414 after previous downtrending. Continue to monitor. Likely due to malignancy    #Abnormal LFTS   Likely due to underlying disease. No further evaluation needed now.     # Antiphospholipid Syndrome  Element of Lupus. Hold ASA   No A/C    #Lupus  C/W  mg po q12  C/W cymbalta   C/W tramadol prn    #HTN  C/W lisinopril 40 mg q daily. C/W procardia xl 30 qd. Patient currently 110/64.                                                                                                ----------------------------------------------------  # DVT prophylaxis:   -LMWH 40 mg subQ every 24 hours     # GI prophylaxis: None    # Diet : DASH   -sodium and cholesterol restricted    # Activity Score (AM-PAC)  -patient walks independently     # Code status: FULL    # Disposition   -BM biopsy, F/U IR , Labs, cont IVFs f/u Ca   -set up outpatient Oncology                                                                             --------------------------------------------------------    # Handoff      KELSIE JUAREZ 57y Female  MRN#: [id number]   CODE STATUS: FULL    Hospital Day: 5d    Pt is currently admitted with the primary diagnosis of 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    Hospital Course:     Patient presented to ED with diffuse pelvic pain X3 weeks  associated with nausea, vomiting and lack of appetite X 1 week. Patient being evaluated for suspected malignancy as ED CT A/P demonstrated bilateral adnexal hypodensities, para-aortic lymphadenopathy as well as bilateral pulmonary nodules.     SUBJECTIVE    Overnight events:  Patient reported sore throat beginning overnight on 7/17/22 with associated dry cough.    Subjective complaints:  Patient reports pain 2/10 diffuse in hypogastrium. Patient reports cramps after urination but denies pain upon palpation of abdomen. Patient reported her period ended 7/16/22 with a duration of 10 days and increased bleeding upon heparin administration. Patient reports sore throat starting 7/17/22 with associated dry cough denies fever, chills. Patient has reported emesis and nausea and prefers not to eat to prevent future emesis. Patient NPO since midnight as patient undergoing LN biopsy today with IR 7/19/22. Patient admits to nausea and vomiting since 7/18/22.     Present Today:   - Bermudez:  No [X  ], Yes [   ] : Indication:     - Type of IV Access:       .. CVC/Piccline:  No [ X], Yes [   ] : Indication:       .. Midline: No [X  ], Yes [   ] : Indication:                                                 ----------------------------------------------------------  OBJECTIVE  PAST MEDICAL & SURGICAL HISTORY  Lupus    Hypothyroidism    Iron deficiency anemia    No significant past surgical history                                              -----------------------------------------------------------  ALLERGIES:  NSAIDs (Stomach Upset)                                            ------------------------------------------------------------    HOME MEDICATIONS  Home Medications:  DULoxetine 60 mg oral delayed release capsule: 1 cap(s) orally once a day (14 Jul 2022 22:46)  hydroxychloroquine 200 mg oral tablet: 1 tab(s) orally 2 times a day (14 Jul 2022 22:44)  lisinopril 40 mg oral tablet: 1 tab(s) orally once a day (14 Jul 2022 22:44)  Synthroid 150 mcg (0.15 mg) oral tablet: 1 tab(s) orally once a day (14 Jul 2022 22:46)                         MEDICATIONS:  STANDING MEDICATIONS  chlorhexidine 4% Liquid 1 Application(s) Topical <User Schedule>  DULoxetine 60 milliGRAM(s) Oral daily  enoxaparin Injectable 40 milliGRAM(s) SubCutaneous every 24 hours  ferrous    sulfate 325 milliGRAM(s) Oral daily  hydroxychloroquine 200 milliGRAM(s) Oral two times a day  levothyroxine 150 MICROGram(s) Oral daily  lisinopril 40 milliGRAM(s) Oral daily  melatonin 5 milliGRAM(s) Oral at bedtime  NIFEdipine XL 30 milliGRAM(s) Oral daily  sodium chloride 0.9%. 1000 milliLiter(s) IV Continuous <Continuous>    PRN MEDICATIONS  morphine  - Injectable 2 milliGRAM(s) IV Push every 4 hours PRN  ondansetron Injectable 8 milliGRAM(s) IV Push every 8 hours PRN  traMADol 25 milliGRAM(s) Oral two times a day PRN                                            ------------------------------------------------------------  VITAL SIGNS: Last 24 Hours  T(C): 36.6 (19 Jul 2022 11:50), Max: 36.6 (19 Jul 2022 11:50)  T(F): 97.9 (19 Jul 2022 11:50), Max: 97.9 (19 Jul 2022 11:50)  HR: 82 (19 Jul 2022 11:50) (74 - 82)  BP: 142/69 (19 Jul 2022 11:50) (111/59 - 142/69)  BP(mean): --  RR: 18 (19 Jul 2022 11:50) (18 - 18)  SpO2: 96% (18 Jul 2022 20:00) (96% - 96%)                                          -------------------------------------------------------------  RADIOLOGY:    US Pelvis 7/15/22 8:29 PM  -Ovaries not well delineated. Lower pelvic 5.6 cm heterogenous solid appearing structure. Right pelvic 6.6 cm heterogenous mixed solid-cystic structure  CT Chest with IV Contrast 7/15/22  -Multiple Bilateral solid pulmonary nodules measuring 1.7 cm at left upper lobe, 1.5 cm at left lower lobe and 1 cm at right upper lobe.   CT Abdomen 7/14/22   -Multiple scattered bilateral solid pulmonary nodules, largest 8 mm in right middle lobe.    -Diffuse rounded para-aortic lymphadenopathy, measuring 1.7 cm.  -Confluent hypodense lesions are visualized bilateral adnexa.                                            --------------------------------------------------------------  PHYSICAL EXAM:  General: NAD, well appearing, alert interactive  HEENT: erythematous oropharynx, non edematous, no plaques noted  LUNGS: normal respiratory effort, no wheezing, rhonchi, or rales  HEART: normal S1/S2, no murmurs or gallop  ABDOMEN: soft, non distended, non tender to light and deep palpation, normal bowel sounds  EXT: no edema in LE  NEURO: alert and oriented X3   SKIN: intact, no rashes, lesions, erythema    LABS:                        8.8    28.20 )-----------( 414      ( 19 Jul 2022 09:55 )             27.0     07-19    137  |  101  |  15  ----------------------------<  93  4.0   |  23  |  0.8    Ca    10.8<H>      19 Jul 2022 09:55  Mg     1.7     07-19    TPro  5.7<L>  /  Alb  3.1<L>  /  TBili  0.3  /  DBili  x   /  AST  27  /  ALT  24  /  AlkPhos  163<H>  07-19    PT/INR - ( 19 Jul 2022 09:55 )   PT: 13.00 sec;   INR: 1.13 ratio         PTT - ( 19 Jul 2022 09:55 )  PTT:29.0 sec                                           --------------------------------------------------------------

## 2022-07-19 NOTE — PROGRESS NOTE ADULT - ASSESSMENT
Patient presented to ED with diffuse pelvic pain X3 weeks  associated with nausea, vomiting and lack of appetite X 1 week. Patient being evaluated for suspected malignancy as ED CT A/P demonstrated bilateral adnexal hypodensities, para-aortic lymphadenopathy as well as bilateral pulmonary nodules    #hypercalcemia  Ca is improving with N/S 100/hr.   Currently 11.1 (11.8  corrected calcium level).   Continue to monitor but add prednisone or Aredia if patient does not continue to downtrend with levels as hypercalcemia likely due to malignancy.   F/U Labs  F/U PTHrp.   Obtain 25 OH Vitamin D.   Check BMP q24.    #suspected malignancy: b/l adnexal lesions; paraortic LN; 15+ pulmonary nodules  b/l adnexal lesions-   Patient going for biopsy of LN today 7/19/22  F/U pathology for dx  CT A/P b/l adnexal hypodensities, para-aortic lymphadenopathy; b/l pulm nodules  TVUS Rt 6.6 cm mixed cystic-solid structure with doppler  : 33; CA 19-9: 19 nl; CEA 5.5; :,   F/U inhibin     pulm nodules- CT  15+ pulm nodules up to 1.7 cm   F/U IR for biopsy  F/U pathology    #marked leukocytosis  WBC has increased to 28 after previous downtrending. This elevation likely due to reactivity to malignancy or lymphoma. Continue to monitor.  F/U myelodysplastic disorders and mutational analysis BCR/ABL, CALR, MPL, JAK2  F/U lymphoma suspected: flow cytometry   F/U: BM biopsy held at moment until biopsy results finalize    #microcytic anemia  Iron is 8.4 continue with ferrous sulfate 325 mg oral daily.   F/U Haptoglobin  F/U MMA  Because reticulocyte count 2.7% and bilirubin not elevated at 0.3 less suspicion for hemolysis.    #Thrombocytosis  Platelet count at 414 after previous downtrending. Continue to monitor. Likely due to malignancy    #Abnormal LFTS   Likely due to underlying disease. No further evaluation needed now.     # Antiphospholipid Syndrome  Element of Lupus. Hold ASA   No A/C    #Lupus  C/W  mg po q12  C/W cymbalta   C/W tramadol prn    #HTN  C/W lisinopril 40 mg q daily. C/W procardia xl 30 qd. Patient currently 110/64.                                                                              ----------------------------------------------------  # DVT prophylaxis:   -LMWH 40 mg subQ every 24 hours     # GI prophylaxis: None    # Diet : DASH   -sodium and cholesterol restricted    # Activity Score (AM-PAC)  -patient walks independently     # Code status: FULL    # Disposition   -BM biopsy, F/U IR , Labs, cont IVFs f/u Ca   -set up outpatient Oncology                                                                             --------------------------------------------------------    # Handoff

## 2022-07-19 NOTE — PROGRESS NOTE ADULT - SUBJECTIVE AND OBJECTIVE BOX
SUBJECTIVE:    Patient is a 57y old Female who presents with a chief complaint of diffuse pelvic pain associated with nausea and vomiting (19 Jul 2022 11:07)    Currently admitted to medicine with the primary diagnosis of Suspected malignant neoplasm of ovary       Today is hospital day 5d.     PAST MEDICAL & SURGICAL HISTORY  Lupus    Hypothyroidism    Iron deficiency anemia    No significant past surgical history      ALLERGIES:  NSAIDs (Stomach Upset)    MEDICATIONS:  STANDING MEDICATIONS  chlorhexidine 4% Liquid 1 Application(s) Topical <User Schedule>  DULoxetine 60 milliGRAM(s) Oral daily  ferrous    sulfate 325 milliGRAM(s) Oral daily  hydroxychloroquine 200 milliGRAM(s) Oral two times a day  iron sucrose IVPB 200 milliGRAM(s) IV Intermittent every 24 hours  levothyroxine 150 MICROGram(s) Oral daily  lisinopril 40 milliGRAM(s) Oral daily  melatonin 5 milliGRAM(s) Oral at bedtime  metoclopramide Injectable 10 milliGRAM(s) IV Push once  NIFEdipine XL 30 milliGRAM(s) Oral daily  sodium chloride 0.9%. 1000 milliLiter(s) IV Continuous <Continuous>    PRN MEDICATIONS  ondansetron Injectable 8 milliGRAM(s) IV Push every 8 hours PRN  oxycodone    5 mG/acetaminophen 325 mG 1 Tablet(s) Oral every 4 hours PRN    VITALS:   T(F): 97.9  HR: 82  BP: 142/69  RR: 18  SpO2: 96%    LABS:                        8.8    28.20 )-----------( 414      ( 19 Jul 2022 09:55 )             27.0     07-19    137  |  101  |  15  ----------------------------<  93  4.0   |  23  |  0.8    Ca    10.8<H>      19 Jul 2022 09:55  Mg     1.7     07-19    TPro  5.7<L>  /  Alb  3.1<L>  /  TBili  0.3  /  DBili  x   /  AST  27  /  ALT  24  /  AlkPhos  163<H>  07-19    PT/INR - ( 19 Jul 2022 09:55 )   PT: 13.00 sec;   INR: 1.13 ratio         PTT - ( 19 Jul 2022 09:55 )  PTT:29.0 sec              RADIOLOGY:    PHYSICAL EXAM:  GEN: No acute distress  LUNGS: Clear to auscultation bilaterally   HEART: S1/S2 present. RRR.   ABD/ GI: Soft, non-tender, non-distended. Bowel sounds present  EXT: NC/NC/NE/2+PP/BROWER  NEURO: AAOX3

## 2022-07-19 NOTE — PROGRESS NOTE ADULT - ASSESSMENT
Pt is a 58 y/o F with PMHx of lupus, hypothyroidism, antiphospholipid syndrome, HTN, and anemia who presented to the ER complaining of diffuse pelvic pain and back pain x 3 weeks associated with decreased appetite and vomiting over the last 8 days.    # COVID exposed 7/16  so far COVID neg  full isolation for now  f/u infection control for length of quarantine and next COVID testing    # hypercalcemia - this was likely the cause of the vomiting and decr appetite  iPTH < 20 -> hyperpara RULED OUT  likely malign in origin - prob lymphoma, which would make it amenable to steroids  zofran 4mg iv q6 prn n/v  Ca is improving w/ fluids  IVFs: /hr + oral hydration  will hold off on Aredia as pt responding to fluids  if Ca < 11.4 (corrected), she can safely have sedation/anesth to obtain tissue bx  highly unlikely to be sarcoid - no need to check 1,25 diOH Vit D -> would just wait on bx results  f/u PTHrP  f/u 25 OH Vit D    # suspect malignancy: b/l adnexal lesions; paraaortic LN; 15+ pulm nodules; NO B symptoms  CT C: 15+ pulm nod up to 1.7cm - IR will bx héctor: NPO p MN; cancelled today-- plan for LN biopsy of pulm nodule  CT A/P: bilateral adnexal hypodensities, para-aortic lymphadenopathy as well as bilateral pulm nodules  TVUS: Rt 6.6cm mixed solid-cystic structure; Lt 5.6 cm heterog, solid structure w/ doppler flow   H/O noted  Gyn/Onc noted  : 33 (nl); CA 19-9: 19 (nl); CEA 5.5; ; HCG < 0.6; S preg neg; ESR 80; uric acid 6.7  Inhibin A/B: pending  percocet 5/325mg po q4 prn pain    # marked leukocytosis  prob reactive and concentrated   bld cx neg x2  Ucx Grp B strp 50-99K, but seems asymp and WBC improving w/out abx: cont OFF abx  f/u BCR/ABL; CALR; MPL mut analysis; JAK2; flow cyto   consider BM Bx    # microcytic anemia  prob of malign  iron sat 10%, TIBC 314, Ferr 85 = could be iron def  no IV iron as we cont to r/o infection  FeSO4 325mg po q24  B12, Fol nl   - suspect this is a marker for lymphoma    # Thrombocytosis   likely reactive; improved to nl; down from 803 w/ fluid    # abnl LFTs mild (minor incr AP)  prob related to underlying issue above  no further w/u for now    # antiphospholipid syndrome  prob just element of lupus hx  no clots; only 1 miscarriage  hold asa  no a/c    # lupus - does not seem active currently  c/w HCQ 200mg po q12  c/w cymbalta  c/w tramadol prn    # HTN  c/w lisinopril 40mg oral daily  c/w procardia xl 30 qd     # Hypothyroidism  c/w levothyroxine 150mg oral daily  check TSH    # DVT ppx: LMWH (hold in am for bx)    # GI ppx: none    # Activity: can walk on her own    # Code: full    Dispo: need tissue dx - f/u IR; NPO p MN;   eventually, pt will go home - not ready for d/c

## 2022-07-20 NOTE — PROGRESS NOTE ADULT - ASSESSMENT
Patient presented to ED with diffuse pelvic pain X3 weeks  associated with nausea, vomiting and lack of appetite X 1 week. Patient being evaluated for suspected malignancy as ED CT A/P demonstrated bilateral adnexal hypodensities, para-aortic lymphadenopathy as well as bilateral pulmonary nodules    #hypercalcemia  Ca is improving with N/S 100/hr, one time dose 40mg IV Lasix   Ca currently 10.4 (11.2  corrected)   Continue to monitor but add prednisone or Aredia if patient does not continue to downtrend with levels as hypercalcemia likely due to malignancy.   F/u PTHrP.   F/u25-OH Vitamin D.   Check BMP qd    #suspected malignancy: b/l adnexal lesions; paraortic LN; 15+ pulmonary nodules  b/l adnexal lesions-   Patient going for biopsy of LN today 7/20/22  F/U pathology for dx  CT A/P b/l adnexal hypodensities, para-aortic lymphadenopathy; b/l pulm nodules  TVUS Rt 6.6 cm mixed cystic-solid structure with doppler  : 33; CA 19-9: 19 nl; CEA 5.5; :,   F/U inhibin     pulm nodules- CT  15+ pulm nodules up to 1.7 cm   F/U IR for biopsy  F/U pathology    #marked leukocytosis  WBC 25.3.  Continue to monitor.  F/u myelodysplastic disorders and mutational analysis CALR, MPL, JAK2  Flow cytometry unremarkable  BCL-ABL negative  F/u: BM biopsy held at moment until biopsy results finalize    #microcytic anemia  Iron is 8.4 continue with ferrous sulfate 325 mg oral daily.   F/u Haptoglobin  F/u MMA  Because reticulocyte count 2.7% and bilirubin not elevated at 0.3 less suspicion for hemolysis.    #Thrombocytosis  Platelet count at 316. Continue to monitor. Likely reactive due to malignancy    #Abnormal LFTS   Likely due to underlying disease. No further evaluation needed now.     # Antiphospholipid Syndrome  Element of Lupus. Hold ASA     #Lupus  C/W  mg po q12  C/W cymbalta   C/W tramadol prn    #HTN  C/W lisinopril 40 mg q daily. C/W procardia xl 30 qd.    # DVT prophylaxis:   -LMWH 40 mg subQ every 24 hours     # GI prophylaxis: None    # Diet : DASH   -sodium and cholesterol restricted    # Activity Score (AM-PAC)  -patient walks independently     # Code status: FULL    # Disposition   -BM biopsy, F/U IR , Labs, cont IVFs f/u Ca  -Heme/onc recs for d/c appreciated  -When stable, d/c to home

## 2022-07-20 NOTE — PROGRESS NOTE ADULT - SUBJECTIVE AND OBJECTIVE BOX
KELSIE JUAREZ 57y Female  MRN#: [id number]  FULL CODE  HOSPITAL DAY 5    ADMISSION REASON: SUSPECTED MALIGNANCY NEOPLASM OF OVARY    HPI: Patient is a 56 y/o F with a PMH of SLE, antiphospholipid syndrome, hypothyroidism, HTN, and iron deficiency anemia who reports to the ER complaining diffuse pelvic pain and back pain x 3 weeks associated with decreased appetite and vomiting over the last 8 days. Patient describes the pain as "achy" that comes and goes, starting 3 weeks ago rating it a 10/10. In the ED, CT A/P showed bilateral adnexal hypodensities, para-aortic lymphadenopathy as well as bilateral pulm nodules, findings are suspicious for underlying metastatic ovarian malignancy/lymphoma. Patient denies fever, chills, chest pain, shortness of breath, blood in the vomit or stool, unintentional weight loss. Patient admitted to medicine for further workup of suspected malignancy.    SUBJECTIVE  Complaints: Pt reports that pain and nausea were improved from yesterday.  Overnight events: none    Pt will have IR biopsy of paraaortic LN today    OBJECTIVE    VITAL SIGNS: Last 24 Hours  T(C): 36.1 (20 Jul 2022 12:52), Max: 36.4 (19 Jul 2022 21:00)  T(F): 97 (20 Jul 2022 12:52), Max: 97.5 (19 Jul 2022 21:00)  HR: 86 (20 Jul 2022 12:52) (74 - 86)  BP: 140/63 (20 Jul 2022 12:52) (116/65 - 140/63)  BP(mean): --  RR: 18 (20 Jul 2022 12:52) (18 - 18)  SpO2: --    PHYSICAL EXAM:  General: Well-appearing in NAD.  HEENT: Normocephalic, nontraumatic.   LUNGS: Clear to auscultation b/l. No wheezes, rales, or rhonchi.  HEART: RRR. No murmurs, rubs, or gallops.  ABDOMEN: Nontender, nondistended. + bowel sounds.  EXT: Pulses palpable x 4. No LE edema.  NEURO: AAO x 4.  SKIN: Warm, dry.    ALLERGIES:  NSAIDs (Stomach Upset)    MEDICATIONS:  Home Medications:  DULoxetine 60 mg oral delayed release capsule: 1 cap(s) orally once a day (14 Jul 2022 22:46)  hydroxychloroquine 200 mg oral tablet: 1 tab(s) orally 2 times a day (14 Jul 2022 22:44)  lisinopril 40 mg oral tablet: 1 tab(s) orally once a day (14 Jul 2022 22:44)  Synthroid 150 mcg (0.15 mg) oral tablet: 1 tab(s) orally once a day (14 Jul 2022 22:46)    STANDING MEDICATIONS  chlorhexidine 4% Liquid 1 Application(s) Topical <User Schedule>  DULoxetine 60 milliGRAM(s) Oral daily  hydroxychloroquine 200 milliGRAM(s) Oral two times a day  iron sucrose IVPB 200 milliGRAM(s) IV Intermittent every 24 hours  levothyroxine 150 MICROGram(s) Oral daily  lisinopril 40 milliGRAM(s) Oral daily  melatonin 5 milliGRAM(s) Oral at bedtime  metoclopramide Injectable 10 milliGRAM(s) IV Push once  NIFEdipine XL 30 milliGRAM(s) Oral daily  sodium chloride 0.9%. 1000 milliLiter(s) IV Continuous <Continuous>    PRN MEDICATIONS  ondansetron Injectable 8 milliGRAM(s) IV Push every 8 hours PRN  oxycodone    5 mG/acetaminophen 325 mG 1 Tablet(s) Oral every 4 hours PRN    INS AND OUTS:  07-19-22 @ 07:01  -  07-20-22 @ 07:00  --------------------------------------------------------  IN: 1250 mL / OUT: 0 mL / NET: 1250 mL    LABS:                        8.8    25.30 )-----------( 316      ( 20 Jul 2022 09:04 )             27.3     07-20    135  |  100  |  14  ----------------------------<  72  4.3   |  23  |  0.8    Ca    10.4<H>      20 Jul 2022 09:04  Mg     2.1     07-20    TPro  5.6<L>  /  Alb  3.0<L>  /  TBili  0.2  /  DBili  x   /  AST  32  /  ALT  22  /  AlkPhos  166<H>  07-20    PT/INR - ( 19 Jul 2022 09:55 )   PT: 13.00 sec;   INR: 1.13 ratio    PTT - ( 19 Jul 2022 09:55 )  PTT:29.0 sec    RADIOLOGY:    US Pelvis 7/15/22 8:29 PM  -Ovaries not well delineated. Lower pelvic 5.6 cm heterogenous solid appearing structure. Right pelvic 6.6 cm heterogenous mixed solid-cystic structure    CT Chest with IV Contrast 7/15/22  -Multiple Bilateral solid pulmonary nodules measuring 1.7 cm at left upper lobe, 1.5 cm at left lower lobe and 1 cm at right upper lobe.     CT Abdomen 7/14/22   -Multiple scattered bilateral solid pulmonary nodules, largest 8 mm in right middle lobe.    -Diffuse rounded para-aortic lymphadenopathy, measuring 1.7 cm.  -Confluent hypodense lesions are visualized bilateral adnexa.

## 2022-07-20 NOTE — PROGRESS NOTE ADULT - ATTENDING COMMENTS
56 y/o F with PMHx of SLE, hypothyroidism, antiphospholipid syndrome, HTN, and anemia who presented to the ER complaining of diffuse pelvic pain and back pain x 3 weeks associated with decreased appetite and vomiting over the last 8 days.       # Non PTH mediated hypercalcemia 2/2 malignancy   c/w IVF   Lasix one time dose due to LE edema     # COVID exposed 7/16  isolation precautions     # suspect malignancy: b/l adnexal lesions; paraaortic LN; 15+ pulm nodules   CT C: 15+ pulm nod up to 1.7cm   CT A/P: bilateral adnexal hypodensities, para-aortic lymphadenopathy as well as bilateral pulm nodules  TVUS: Rt 6.6cm mixed solid-cystic structure; Lt 5.6 cm heterog, solid structure w/ doppler flow   IR biopsy today cancelled by patient   percocet 5/325mg po q4 prn pain    # marked leukocytosis  possibly reactive to malignancy   hematology following     # WAYNE   FeSO4 325mg po q24    # Thrombocytosis   likely reactive     # Antiphospholipid syndrome  prob just element of lupus hx  no clots; only 1 miscarriage  hold asa  no a/c    # SLE    c/w HCQ 200mg po q12    # HTN  c/w lisinopril 40mg oral daily  c/w procardia xl 30 qd     # Hypothyroidism  c/w levothyroxine 150mg oral daily      # DVT ppx: Heparin (hold for biopsy)       Handoff:  CT guided lung biopsy   Dispo: Home 56 y/o F with PMHx of SLE, hypothyroidism, antiphospholipid syndrome, HTN, and anemia who presented to the ER complaining of diffuse pelvic pain and back pain x 3 weeks associated with decreased appetite and vomiting over the last 8 days.     # Non PTH mediated hypercalcemia 2/2 malignancy   c/w IVF   Lasix one time dose due to LE edema     # COVID exposed 7/16  isolation precautions     # suspect malignancy: b/l adnexal lesions; paraaortic LN; 15+ pulm nodules   CT C: 15+ pulm nod up to 1.7cm   CT A/P: bilateral adnexal hypodensities, para-aortic lymphadenopathy as well as bilateral pulm nodules  TVUS: Rt 6.6cm mixed solid-cystic structure; Lt 5.6 cm heterog, solid structure w/ doppler flow   IR biopsy today cancelled by patient   percocet 5/325mg po q4 prn pain    # Leukocytosis  possibly reactive to malignancy   hematology following     # WAYNE   c/w Venofer 200mg x 5 days     # Thrombocytosis   likely reactive     # Antiphospholipid syndrome  prob just element of lupus hx  no clots; only 1 miscarriage  hold asa  no a/c    # SLE    c/w HCQ 200mg po q12    # HTN  c/w lisinopril 40mg oral daily  c/w procardia xl 30 qd     # Hypothyroidism  c/w levothyroxine 150mg oral daily      # DVT ppx: Heparin (hold for biopsy)       Handoff:  CT guided lung biopsy   Dispo: Home

## 2022-07-20 NOTE — CHART NOTE - NSCHARTNOTEFT_GEN_A_CORE
pt transferred to CT scan bed refused to proceed with case today since she got lasix and has to pee frequently does not want to proceed with case today .DR Ross spoke to pt and agreed with her concern case cancelled for today by pt

## 2022-07-20 NOTE — CHART NOTE - NSCHARTNOTEFT_GEN_A_CORE
The patient was transferred to the CT table. Given the patient's size it would be required to strap her in with significant pressure. The patient was complaining of pelvic pain and felt as if she could not urinate. She did not wish to continue with the biopsy because she could not tolerate being on the table and would not be able to tolerate any pressure on her abdomen. After discussion with the patient the case was and rescheduled for tomorrow. Recommend placement of a Bermudez prior to procedure tomorrow. Please keep n.p.o. after midnight tonight.

## 2022-07-21 NOTE — CHART NOTE - NSCHARTNOTEFT_GEN_A_CORE
PACU ANESTHESIA ADMISSION NOTE      Procedure: Left upper lobe lung node biopsy  Post op diagnosis:  Hypercalcemia    ____  Intubated  TV:______       Rate: ______      FiO2: ______    _x___  Patent Airway    _x___  Full return of protective reflexes    _x___  Full recovery from anesthesia / back to baseline status    Vitals:  T(C): 98.1F  HR: 84  BP: 170/75  RR: 18  SpO2: 98%    Mental Status:  _x___ Awake   _____ Alert   _____ Drowsy   _____ Sedated    Nausea/Vomiting:  _x___  NO       ______Yes,   See Post - Op Orders         Pain Scale (0-10):  __0___    Treatment: _x___ None    ____ See Post - Op/PCA Orders    Post - Operative Fluids:   __x__ Oral   ____ See Post - Op Orders    Plan: Discharge:   _x___Home       _____Floor     _____Critical Care    _____  Other:_________________    Comments:  No anesthesia issues or complications noted.  Discharge when criteria met.

## 2022-07-21 NOTE — PROGRESS NOTE ADULT - SUBJECTIVE AND OBJECTIVE BOX
KELSIE JUAREZ  57y  Female  ***My note supersedes ALL resident notes that I sign.  My corrections for their notes are in my note.***    I can be reached directly on [email or web address]. My office number is [phone]. My personal cell number is [phone].    INTERVAL EVENTS: Here for f/u of poss cancer. Bx was not done until today for a variety of reasons. Almost not done again for COVID +, but I worked this out w/ IR attd. Pt is feeling OK. Has on/off nausea. Does eat/drink. Pain seems controlled.    T(F): 97 (07-21-22 @ 04:55), Max: 97 (07-20-22 @ 20:50)  HR: 78 (07-21-22 @ 04:55) (78 - 80)  BP: 124/64 (07-21-22 @ 04:55) (124/64 - 135/65)  RR: 18 (07-21-22 @ 04:55) (18 - 18)  SpO2: --    Gen: NAD  HEENT: PERRL, EOMI, mouth clr, nose clr  Neck: no nodes, no JVD, thyroid nl  lungs: clr  hrt: s1 s2 rrr no murmur  abd: soft, ND, mildly tender lower abd; no HS megaly; hard to eval for mass given body habitus  ext: no edema, no c/c  neuro: aa ox3, cn intact, can move all 4 ext    LABS:                      8.9     (    76.7   24.10 )-----------( ---------      420      ( 21 Jul 2022 08:53 )             27.6    (    21.4     134   (   94   (   74      07-21-22 @ 08:53  ----------------------               3.5   (   21   (   13                             -----                        0.8  Ca  10.8 = deborah = 11.6  Mg  1.9    P   --     LFT  5.8  (  0.3  (  27       07-21-22 @ 08:53  -------------------------  3.0  (  170  (  21    Culture - Blood (collected 07-15-22 @ 23:17)  Source: .Blood None  Final Report (07-21-22 @ 07:00):    No Growth Final    Culture - Blood (collected 07-15-22 @ 23:17)  Source: .Blood None  Final Report (07-21-22 @ 07:00):    No Growth Final    Culture - Urine (collected 07-14-22 @ 22:50)  Source: Clean Catch Clean Catch (Midstream)  Final Report (07-18-22 @ 12:02):    50,000 - 99,000 CFU/mL Streptococcus agalactiae (Group B) isolated    Group B streptococci are susceptible to ampicillin,    penicillin and cefazolin, but may be resistant to    erythromycin and clindamycin.    Recommendations for intrapartum prophylaxis for Group B    streptococci are penicillin or ampicillin.    RADIOLOGY & ADDITIONAL TESTS:      MEDICATIONS:  hydroxychloroquine 200 milliGRAM(s) Oral two times a day    chlorhexidine 4% Liquid 1 Application(s) Topical <User Schedule>  DULoxetine 60 milliGRAM(s) Oral daily  iron sucrose IVPB 200 milliGRAM(s) IV Intermittent every 24 hours  levothyroxine 150 MICROGram(s) Oral daily  lisinopril 40 milliGRAM(s) Oral daily  melatonin 5 milliGRAM(s) Oral at bedtime  metoclopramide Injectable 10 milliGRAM(s) IV Push once  NIFEdipine XL 30 milliGRAM(s) Oral daily  ondansetron Injectable 8 milliGRAM(s) IV Push every 8 hours PRN  oxycodone    5 mG/acetaminophen 325 mG 1 Tablet(s) Oral every 4 hours PRN  sodium chloride 0.9%. 1000 milliLiter(s) IV Continuous <Continuous>

## 2022-07-21 NOTE — PROGRESS NOTE ADULT - SUBJECTIVE AND OBJECTIVE BOX
INTERVENTIONAL RADIOLOGY BRIEF-OPERATIVE NOTE    Procedure: CARO lung lesion biopsy    Pre-Op Diagnosis: lung lesion    Post-Op Diagnosis: same as pre    Attending: jen  Resident:     Anesthesia (type):  [ ] General Anesthesia  [ ] Deep Sedation - provided by anesthesiologist  [ ] Conscious Sedation -  Versed and Fentanyl   [x ] Local/Regional    Contrast: 0    Estimated Blood Loss: min    Condition:   [ ] Critical  [ ] Serious  [ ] Fair   [x ] Good    Findings/Follow up Plan of Care: successful ct guided left upper lung lesion 20G core biopsy.    Specimens Removed: above    Implants: biosentry    Complications: no acute    Disposition: recovery then back to floor if chest xray clear.        Please call Interventional Radiology [phone] with any questions, concerns, or issues.

## 2022-07-21 NOTE — PRE-ANESTHESIA EVALUATION ADULT - MALLAMPATI CLASS
Class IV (difficult) - the soft palate is not visible at all
Class IV (difficult) - the soft palate is not visible at all
Class III - visualization of the soft palate and the base of the uvula

## 2022-07-21 NOTE — CHART NOTE - NSCHARTNOTEFT_GEN_A_CORE
Pt is COVID +.  Lung bx is urgent. Pt suspected to lymphoma and malignant hypercalcemia. Pt should NOT wait 10 days for bx.

## 2022-07-21 NOTE — PROGRESS NOTE ADULT - SUBJECTIVE AND OBJECTIVE BOX
KELSIE JUAREZ 57y Female  MRN#: [id number]  FULL CODE  HOSPITAL DAY 6    ADMISSION REASON: SUSPECTED MALIGNANCY NEOPLASM OF OVARY    HPI: Patient is a 56 y/o F with a PMH of SLE, antiphospholipid syndrome, hypothyroidism, HTN, and iron deficiency anemia who reports to the ER complaining diffuse pelvic pain and back pain x 3 weeks associated with decreased appetite and vomiting over the last 8 days. Patient describes the pain as "achy" that comes and goes, starting 3 weeks ago rating it a 10/10. In the ED, CT A/P showed bilateral adnexal hypodensities, para-aortic lymphadenopathy as well as bilateral pulm nodules, findings are suspicious for underlying metastatic ovarian malignancy/lymphoma. Patient denies fever, chills, chest pain, shortness of breath, blood in the vomit or stool, unintentional weight loss. Patient admitted to medicine for further workup of suspected malignancy.    SUBJECTIVE  Complaints: Pt reports one episode of nausea and vomiting last night. Given PRN Zofran, which improved.   Overnight events: none    Pt declined IR biopsy yesterday, see IR note  Pt will have IR biopsy of paraaortic LN today    OBJECTIVE    VITAL SIGNS: Last 24 Hours  T(C): 36.1 (21 Jul 2022 04:55), Max: 36.1 (20 Jul 2022 20:50)  T(F): 97 (21 Jul 2022 04:55), Max: 97 (20 Jul 2022 20:50)  HR: 78 (21 Jul 2022 04:55) (78 - 80)  BP: 124/64 (21 Jul 2022 04:55) (124/64 - 135/65)  BP(mean): --  RR: 18 (21 Jul 2022 04:55) (18 - 18)  SpO2: --    PHYSICAL EXAM:  General: Well-appearing in NAD.  HEENT: Normocephalic, nontraumatic.   LUNGS: Clear to auscultation b/l. No wheezes, rales, or rhonchi.  HEART: RRR. No murmurs, rubs, or gallops.  ABDOMEN: Nontender, nondistended. + bowel sounds.  EXT: Pulses palpable x 4. No LE edema.  NEURO: AAO x 4.  SKIN: Warm, dry.    MEDICATIONS  Home Medications:  DULoxetine 60 mg oral delayed release capsule: 1 cap(s) orally once a day (14 Jul 2022 22:46)  hydroxychloroquine 200 mg oral tablet: 1 tab(s) orally 2 times a day (14 Jul 2022 22:44)  lisinopril 40 mg oral tablet: 1 tab(s) orally once a day (14 Jul 2022 22:44)  Synthroid 150 mcg (0.15 mg) oral tablet: 1 tab(s) orally once a day (14 Jul 2022 22:46)    STANDING MEDICATIONS  chlorhexidine 4% Liquid 1 Application(s) Topical <User Schedule>  DULoxetine 60 milliGRAM(s) Oral daily  hydroxychloroquine 200 milliGRAM(s) Oral two times a day  iron sucrose IVPB 200 milliGRAM(s) IV Intermittent every 24 hours  levothyroxine 150 MICROGram(s) Oral daily  lisinopril 40 milliGRAM(s) Oral daily  melatonin 5 milliGRAM(s) Oral at bedtime  metoclopramide Injectable 10 milliGRAM(s) IV Push once  NIFEdipine XL 30 milliGRAM(s) Oral daily  sodium chloride 0.9%. 1000 milliLiter(s) IV Continuous <Continuous>    PRN MEDICATIONS  ondansetron Injectable 8 milliGRAM(s) IV Push every 8 hours PRN  oxycodone    5 mG/acetaminophen 325 mG 1 Tablet(s) Oral every 4 hours PRN    LABS:                        8.9    24.10 )-----------( 420      ( 21 Jul 2022 08:53 )             27.6     07-21    134<L>  |  94<L>  |  13  ----------------------------<  74  3.5   |  21  |  0.8    Ca    10.8<H>      21 Jul 2022 08:53  Mg     1.9     07-21    TPro  5.8<L>  /  Alb  3.0<L>  /  TBili  0.3  /  DBili  x   /  AST  27  /  ALT  21  /  AlkPhos  170<H>  07-21    RADIOLOGY:    US Pelvis 7/15/22 8:29 PM  -Ovaries not well delineated. Lower pelvic 5.6 cm heterogenous solid appearing structure. Right pelvic 6.6 cm heterogenous mixed solid-cystic structure    CT Chest with IV Contrast 7/15/22  -Multiple Bilateral solid pulmonary nodules measuring 1.7 cm at left upper lobe, 1.5 cm at left lower lobe and 1 cm at right upper lobe.     CT Abdomen 7/14/22   -Multiple scattered bilateral solid pulmonary nodules, largest 8 mm in right middle lobe.    -Diffuse rounded para-aortic lymphadenopathy, measuring 1.7 cm.  -Confluent hypodense lesions are visualized bilateral adnexa.

## 2022-07-21 NOTE — CHART NOTE - NSCHARTNOTEFT_GEN_A_CORE
Patient fell down after getting up from the toilet this evening. She denies feeling any dizziness or palpitations prior to falling down, but reports feeling her legs being weak which caused her to fall. She fell down to a sitting position and was unable to get back up without help. She DID NOT hit her head. I assessed the patient for any bruises, none were noted after the fall. Physical exam normal. She denied any new pains, but reported her usual pelvic and lower back pain. Patient fell down after getting up from the toilet this evening. She denies feeling any dizziness or palpitations prior to falling down, but reports feeling her legs being weak which caused her to fall. She fell down to a sitting position and was unable to get back up without help. She DID NOT hit her head. I assessed the patient for any bruises, none were noted after the fall. Physical exam normal. She denied any new pains, but reported her usual pelvic and lower back pain.  Xray lumbosacral will be done to rule out fracture.

## 2022-07-21 NOTE — PROGRESS NOTE ADULT - ASSESSMENT
Patient presented to ED with diffuse pelvic pain X3 weeks  associated with nausea, vomiting and lack of appetite X 1 week. Patient being evaluated for suspected malignancy as ED CT A/P demonstrated bilateral adnexal hypodensities, para-aortic lymphadenopathy as well as bilateral pulmonary nodules    #hypercalcemia  Ca is improving with N/S 100/hr, s/p one time dose 40mg IV Lasix 7/20  Ca currently 10.8 (11.6  corrected)   Continue to monitor but add prednisone or Aredia if patient does not continue to downtrend with levels as hypercalcemia likely due to malignancy.   F/u PTHrP.   F/u 25-OH Vitamin D.   Check BMP qd    #suspected malignancy: b/l adnexal lesions; paraortic LN; 15+ pulmonary nodules  b/l adnexal lesions-   Patient going for biopsy of LN today 7/21/22  F/U pathology for dx  CT A/P b/l adnexal hypodensities, para-aortic lymphadenopathy; b/l pulm nodules  TVUS Rt 6.6 cm mixed cystic-solid structure with doppler  : 33; CA 19-9: 19 nl; CEA 5.5; :,   F/U inhibin     pulm nodules- CT  15+ pulm nodules up to 1.7 cm   F/U IR for biopsy  F/U pathology    #marked leukocytosis  WBC 25.3.  Continue to monitor.  F/u myelodysplastic disorders and mutational analysis CALR, MPL, JAK2  Flow cytometry unremarkable  BCL-ABL negative  F/u: BM biopsy held at moment until biopsy results finalize    #microcytic anemia  Iron is 8.4 continue with ferrous sulfate 325 mg oral daily x 5 days (until 7/25).   F/u Haptoglobin  F/u MMA  Because reticulocyte count 2.7% and bilirubin not elevated at 0.3 less suspicion for hemolysis.    #Thrombocytosis  Platelet count at 316. Continue to monitor. Likely reactive due to malignancy    #Abnormal LFTS   Likely due to underlying disease. No further evaluation needed now.     # Antiphospholipid Syndrome  Element of Lupus. Hold ASA     #Lupus  C/W  mg po q12  C/W cymbalta   C/W tramadol prn    #HTN  C/W lisinopril 40 mg q daily. C/W procardia xl 30 qd.    # DVT prophylaxis:   -LMWH 40 mg subQ every 24 hours     # GI prophylaxis: None    # Diet : DASH   -sodium and cholesterol restricted    # Activity Score (AM-PAC)  -patient walks independently     # Code status: FULL    # Disposition   -BM biopsy, F/U IR , Labs, cont IVFs f/u Ca  -Hem/onc agrees with discharge plan, f/u o/p  -D/c to home today or tomorrow after biopsy if tolerates well and nausea/vomiting controlled

## 2022-07-21 NOTE — PROGRESS NOTE ADULT - ASSESSMENT
Pt is a 58 y/o F with PMHx of lupus, hypothyroidism, antiphospholipid syndrome, HTN, and anemia who presented to the ER complaining of diffuse pelvic pain and back pain x 3 weeks associated with decreased appetite and vomiting over the last 8 days.    # IR wanted jones prior to bx  can d/c jones after bx    # COVID exposed 7/16;  has COVID  NOW COVID + 7/21  full isolation for 10 days (7/21-7/31)  ask ID if MAB indicated  no RDV or dexa needed  pt on RA  pt had a slight cough a few days ago, but now feeling better (not much COVID symp now)  this is NOT why wbc high    # hypercalcemia - this was likely the cause of the vomiting and decr appetite  iPTH < 20 -> hyperpara RULED OUT  likely malign in origin - prob lymphoma, which would make it amenable to steroids  zofran 4mg iv q6 prn n/v  Ca was improving w/ fluids, but deborah Ca still >11  give Pamidronate 90mg iv x1  IVFs: c/w /hr + oral hydration  highly unlikely to be sarcoid - no need to check 1,25 diOH Vit D -> would just wait on bx results  f/u PTHrP  f/u 25 OH Vit D  BMP q24    # suspect malignancy: b/l adnexal lesions; paraaortic LN; 15+ pulm nodules; NO B symptoms  CT C: 15+ pulm nod up to 1.7cm - IR s/p bx of lung nod 7/21  f/u path  CT A/P: bilateral adnexal hypodensities, para-aortic lymphadenopathy as well as bilateral pulm nodules  TVUS: Rt 6.6cm mixed solid-cystic structure; Lt 5.6 cm heterog, solid structure w/ doppler flow   H/O noted  Gyn/Onc noted  : 33 (nl); CA 19-9: 19 (nl); CEA 5.5; ; HCG < 0.6; S preg neg; ESR 80; uric acid 6.7  Inhibin A: neg; B: slightly elevated   percocet 5/325mg po q4 prn pain    # marked leukocytosis  prob reactive and concentrated   bld cx neg x2  Ucx Grp B strp 50-99K, but seems asymp and WBC improving w/out abx: cont OFF abx  BCR/ABL neg; JAK2 neg; flow cyto neg  f/u CALR; MPL mut analysis;   consider BM Bx    # microcytic anemia  prob of malign  iron sat 10%, TIBC 314, Ferr 85 = could be iron def  no IV iron for now  FeSO4 325mg po q24  B12, Fol nl  hapto 262  f/u MMA  retic 2.7%; t red 0.3   - this is NOT hemolysis, even intramedullary; I suspect this is a marker for lymphoma    # Thrombocytosis   likely reactive; improved; down from 803 w/ fluid    # abnl LFTs mild (minor incr AP)  prob related to underlying issue above  no further w/u for now    # antiphospholipid syndrome  prob just element of lupus hx  no clots; only 1 miscarriage  hold asa - could resume tomorrow  no a/c    # lupus - does not seem active currently  c/w HCQ 200mg po q12  c/w cymbalta  d/c tramadol prn    # HTN  c/w lisinopril 40mg oral daily  c/w procardia xl 30 qd     # Hypothyroidism  c/w levothyroxine 150mg oral daily  check TSH    # DVT ppx: resume LMWH héctor    # GI ppx: none    # Activity: can walk on her own    # Code: full    Dispo: f/u tissue bx; aredia x1; f/u H/O; f/u labs above; IVFs; f/u Ca; tx pain and n/v; TSH  eventually, pt will go home - anticipate d/c héctor

## 2022-07-22 NOTE — CONSULT NOTE ADULT - SUBJECTIVE AND OBJECTIVE BOX
KELSIE JUAREZ  57y, Female  Allergy: NSAIDs (Stomach Upset)      CHIEF COMPLAINT:   Suspected malignancy neoplasm of ovary (21 Jul 2022 13:45)      LOS  8d    HPI  HPI:  Patient is a 56 y/o F with a pmhx of lupus, hypothyroidism, antiphospholipid syndrome, HTN, anemia who reports to the ER complaining diffuse pelvic pain and back pain x 3 weeks associated with decreased appetite and vomiting over the last 8 days. Patient describes the pain as "achy" that comes and goes, starting 3 weeks ago rating it a 10/10. In the ED, CT A/P showed bilateral adnexal hypodensities, para-aortic lymphadenopathy as well as bilateral pulm nodules, findings are suspicious for underlying metastatic ovarian   malignancy/lymphoma.. Patient denies fever, chills, chest pain, shortness of breath, blood in the vomit or stool, unintentional weight loss.  (14 Jul 2022 22:35)      INFECTIOUS DISEASE HISTORY:  ID consulted for hospital acquired covid, satting well on RA   s/p Cipro/metroNIDAZOLE  IVPB 7/14-7/15  COVID exposed 7/16;  has COVID    had a slight cough    PMH  PAST MEDICAL & SURGICAL HISTORY:  Lupus      Hypothyroidism      Iron deficiency anemia      No significant past surgical history          FAMILY HISTORY  No pertinent family history in first degree relatives        SOCIAL HISTORY  Social History:  etOH: socially  Illicit drugs: denies  smoking: former smoker > 40 years ago (14 Jul 2022 22:35)        ROS  ***    VITALS:  T(F): 97.9, Max: 97.9 (07-22-22 @ 05:05)  HR: 84  BP: 132/63  RR: 20Vital Signs Last 24 Hrs  T(C): 36.6 (22 Jul 2022 05:05), Max: 36.6 (22 Jul 2022 05:05)  T(F): 97.9 (22 Jul 2022 05:05), Max: 97.9 (22 Jul 2022 05:05)  HR: 84 (22 Jul 2022 05:05) (84 - 97)  BP: 132/63 (22 Jul 2022 05:05) (132/63 - 177/81)  BP(mean): 123 (21 Jul 2022 22:06) (116 - 123)  RR: 20 (22 Jul 2022 05:05) (20 - 20)  SpO2: --        PHYSICAL EXAM:  ***    TESTS & MEASUREMENTS:                        8.9    24.10 )-----------( 420      ( 21 Jul 2022 08:53 )             27.6     07-21    134<L>  |  94<L>  |  13  ----------------------------<  74  3.5   |  21  |  0.8    Ca    10.8<H>      21 Jul 2022 08:53  Mg     1.9     07-21    TPro  5.8<L>  /  Alb  3.0<L>  /  TBili  0.3  /  DBili  x   /  AST  27  /  ALT  21  /  AlkPhos  170<H>  07-21      LIVER FUNCTIONS - ( 21 Jul 2022 08:53 )  Alb: 3.0 g/dL / Pro: 5.8 g/dL / ALK PHOS: 170 U/L / ALT: 21 U/L / AST: 27 U/L / GGT: x               Culture - Blood (collected 07-15-22 @ 23:17)  Source: .Blood None  Final Report (07-21-22 @ 07:00):    No Growth Final    Culture - Blood (collected 07-15-22 @ 23:17)  Source: .Blood None  Final Report (07-21-22 @ 07:00):    No Growth Final    Culture - Urine (collected 07-14-22 @ 22:50)  Source: Clean Catch Clean Catch (Midstream)  Final Report (07-18-22 @ 12:02):    50,000 - 99,000 CFU/mL Streptococcus agalactiae (Group B) isolated    Group B streptococci are susceptible to ampicillin,    penicillin and cefazolin, but may be resistant to    erythromycin and clindamycin.    Recommendations for intrapartum prophylaxis for Group B    streptococci are penicillin or ampicillin.            INFECTIOUS DISEASES TESTING  COVID-19 PCR: Detected (07-21-22 @ 06:00)  COVID-19 PCR: NotDetec (07-14-22 @ 19:50)      INFLAMMATORY MARKERS      RADIOLOGY & ADDITIONAL TESTS:  I have personally reviewed the last Chest xray  CXR      CT      CARDIOLOGY TESTING  12 Lead ECG:   Ventricular Rate 98 BPM    Atrial Rate 98 BPM    P-R Interval 134 ms    QRS Duration 86 ms    Q-T Interval 328 ms    QTC Calculation(Bazett) 418 ms    P Axis 38 degrees    R Axis 12 degrees    T Axis 4 degrees    Diagnosis Line Normal sinus rhythm  Cannot rule out Anterior infarct , age undetermined  Abnormal ECG    Confirmed by NERI SÁNCHEZ MD ([id number]) on 7/14/2022 6:49:56 PM (07-14-22 @ 17:34)      MEDICATIONS  chlorhexidine 4% Liquid 1 Topical <User Schedule>  DULoxetine 60 Oral daily  enoxaparin Injectable 40 SubCutaneous every 24 hours  hydroxychloroquine 200 Oral two times a day  iron sucrose IVPB 200 IV Intermittent every 24 hours  levothyroxine 150 Oral daily  lisinopril 40 Oral daily  melatonin 5 Oral at bedtime  metoclopramide Injectable 10 IV Push once  NIFEdipine XL 30 Oral daily  sodium chloride 0.9%. 1000 IV Continuous <Continuous>        ANTIBIOTICS:  hydroxychloroquine 200 milliGRAM(s) Oral two times a day      ALLERGIES:  NSAIDs (Stomach Upset)           KELSIE JUAREZ  57y, Female  Allergy: NSAIDs (Stomach Upset)      CHIEF COMPLAINT:   Suspected malignancy neoplasm of ovary (21 Jul 2022 13:45)      LOS  8d    HPI  HPI:  Patient is a 56 y/o F with a pmhx of lupus, hypothyroidism, antiphospholipid syndrome, HTN, anemia who reports to the ER complaining diffuse pelvic pain and back pain x 3 weeks associated with decreased appetite and vomiting over the last 8 days. Patient describes the pain as "achy" that comes and goes, starting 3 weeks ago rating it a 10/10. In the ED, CT A/P showed bilateral adnexal hypodensities, para-aortic lymphadenopathy as well as bilateral pulm nodules, findings are suspicious for underlying metastatic ovarian   malignancy/lymphoma.. Patient denies fever, chills, chest pain, shortness of breath, blood in the vomit or stool, unintentional weight loss.  (14 Jul 2022 22:35)      INFECTIOUS DISEASE HISTORY:  ID consulted for hospital acquired covid, satting well on RA , had mild cough  s/p Cipro/metroNIDAZOLE  IVPB 7/14-7/15  COVID exposed 7/16;  has COVID    PMH  PAST MEDICAL & SURGICAL HISTORY:  Lupus      Hypothyroidism      Iron deficiency anemia      No significant past surgical history          FAMILY HISTORY  No pertinent family history in first degree relatives        SOCIAL HISTORY  Social History:  etOH: socially  Illicit drugs: denies  smoking: former smoker > 40 years ago (14 Jul 2022 22:35)        ROS  General: Denies rigors, nightsweats  HEENT: Denies headache, rhinorrhea, sore throat, eye pain  CV: Denies CP, palpitations  PULM: Denies wheezing, hemoptysis  GI: Denies hematemesis, hematochezia, melena  : Denies discharge, hematuria  MSK: Denies arthralgias, myalgias  SKIN: Denies rash, lesions  NEURO: Denies paresthesias, weakness  PSYCH: Denies depression, anxiety     VITALS:  T(F): 97.9, Max: 97.9 (07-22-22 @ 05:05)  HR: 84  BP: 132/63  RR: 20Vital Signs Last 24 Hrs  T(C): 36.6 (22 Jul 2022 05:05), Max: 36.6 (22 Jul 2022 05:05)  T(F): 97.9 (22 Jul 2022 05:05), Max: 97.9 (22 Jul 2022 05:05)  HR: 84 (22 Jul 2022 05:05) (84 - 97)  BP: 132/63 (22 Jul 2022 05:05) (132/63 - 177/81)  BP(mean): 123 (21 Jul 2022 22:06) (116 - 123)  RR: 20 (22 Jul 2022 05:05) (20 - 20)  SpO2: --        PHYSICAL EXAM:  Gen: NAD, resting in bed obese  HEENT: Normocephalic, atraumatic  Neck: supple, no lymphadenopathy  CV: Regular rate & regular rhythm  Lungs: decreased BS at bases, no fremitus  Abdomen: Soft, BS present  Ext: Warm, well perfused  Neuro: non focal, awake  Skin: no rash, no erythema  Lines: no phlebitis     TESTS & MEASUREMENTS:                        8.9    24.10 )-----------( 420      ( 21 Jul 2022 08:53 )             27.6     07-21    134<L>  |  94<L>  |  13  ----------------------------<  74  3.5   |  21  |  0.8    Ca    10.8<H>      21 Jul 2022 08:53  Mg     1.9     07-21    TPro  5.8<L>  /  Alb  3.0<L>  /  TBili  0.3  /  DBili  x   /  AST  27  /  ALT  21  /  AlkPhos  170<H>  07-21      LIVER FUNCTIONS - ( 21 Jul 2022 08:53 )  Alb: 3.0 g/dL / Pro: 5.8 g/dL / ALK PHOS: 170 U/L / ALT: 21 U/L / AST: 27 U/L / GGT: x               Culture - Blood (collected 07-15-22 @ 23:17)  Source: .Blood None  Final Report (07-21-22 @ 07:00):    No Growth Final    Culture - Blood (collected 07-15-22 @ 23:17)  Source: .Blood None  Final Report (07-21-22 @ 07:00):    No Growth Final    Culture - Urine (collected 07-14-22 @ 22:50)  Source: Clean Catch Clean Catch (Midstream)  Final Report (07-18-22 @ 12:02):    50,000 - 99,000 CFU/mL Streptococcus agalactiae (Group B) isolated    Group B streptococci are susceptible to ampicillin,    penicillin and cefazolin, but may be resistant to    erythromycin and clindamycin.    Recommendations for intrapartum prophylaxis for Group B    streptococci are penicillin or ampicillin.            INFECTIOUS DISEASES TESTING  COVID-19 PCR: Detected (07-21-22 @ 06:00)  COVID-19 PCR: NotDetec (07-14-22 @ 19:50)      INFLAMMATORY MARKERS      RADIOLOGY & ADDITIONAL TESTS:  I have personally reviewed the last Chest xray  CXR      CT      CARDIOLOGY TESTING  12 Lead ECG:   Ventricular Rate 98 BPM    Atrial Rate 98 BPM    P-R Interval 134 ms    QRS Duration 86 ms    Q-T Interval 328 ms    QTC Calculation(Bazett) 418 ms    P Axis 38 degrees    R Axis 12 degrees    T Axis 4 degrees    Diagnosis Line Normal sinus rhythm  Cannot rule out Anterior infarct , age undetermined  Abnormal ECG    Confirmed by NERI SÁNCHEZ MD ([id number]) on 7/14/2022 6:49:56 PM (07-14-22 @ 17:34)      MEDICATIONS  chlorhexidine 4% Liquid 1 Topical <User Schedule>  DULoxetine 60 Oral daily  enoxaparin Injectable 40 SubCutaneous every 24 hours  hydroxychloroquine 200 Oral two times a day  iron sucrose IVPB 200 IV Intermittent every 24 hours  levothyroxine 150 Oral daily  lisinopril 40 Oral daily  melatonin 5 Oral at bedtime  metoclopramide Injectable 10 IV Push once  NIFEdipine XL 30 Oral daily  sodium chloride 0.9%. 1000 IV Continuous <Continuous>        ANTIBIOTICS:  hydroxychloroquine 200 milliGRAM(s) Oral two times a day      ALLERGIES:  NSAIDs (Stomach Upset)

## 2022-07-22 NOTE — DISCHARGE NOTE PROVIDER - CARE PROVIDERS DIRECT ADDRESSES
[email or web address]

## 2022-07-22 NOTE — CONSULT NOTE ADULT - ASSESSMENT
ASSESSMENT  56 y/o F with a pmhx of lupus, hypothyroidism, antiphospholipid syndrome, HTN, anemia who reports to the ER complaining diffuse pelvic pain and back pain x 3 weeks associated with decreased appetite and vomiting over the last 8 days. Patient describes the pain as "achy" that comes and goes, starting 3 weeks ago rating it a 10/10. In the ED, CT A/P showed bilateral adnexal hypodensities, para-aortic lymphadenopathy as well as bilateral pulm nodules, findings are suspicious for underlying metastatic ovarian   malignancy/lymphoma.. Patient denies fever, chills, chest pain, shortness of breath, blood in the vomit or stool, unintentional weight loss, admitted 7/14  ID consulted for hospital-acquired COVID      IMPRESSION  #COVID19, non-severe, not requiring supplemental O2   COVID exposed 7/16;  has COVID  #Leukocytosis- suspect reactive  #Asymptomatic bacteriuria    7/14 BCX NGTD    7/14 UCX < 100k GBS  #Suspected ovarian ca  #Super Morbid Obesity BMI (kg/m2): 66.5  #SLE on plaquenil      Weight (kg): 181.4 (07-21-22 @ 15:32)    RECOMMENDATIONS  This is an incomplete consult note. All final recommendations to follow after interview and examination of the patient. Please follow recommendations noted below.    If any questions, please call or send a message on [employer]  Please continue to update ID with any pertinent new laboratory or radiographic findings  Spectra [id number] ASSESSMENT  58 y/o F with a pmhx of lupus, hypothyroidism, antiphospholipid syndrome, HTN, anemia who reports to the ER complaining diffuse pelvic pain and back pain x 3 weeks associated with decreased appetite and vomiting over the last 8 days. Patient describes the pain as "achy" that comes and goes, starting 3 weeks ago rating it a 10/10. In the ED, CT A/P showed bilateral adnexal hypodensities, para-aortic lymphadenopathy as well as bilateral pulm nodules, findings are suspicious for underlying metastatic ovarian   malignancy/lymphoma.. Patient denies fever, chills, chest pain, shortness of breath, blood in the vomit or stool, unintentional weight loss, admitted 7/14  ID consulted for hospital-acquired COVID      IMPRESSION  #COVID19, non-severe, not requiring supplemental O2   COVID exposed 7/16;  has COVID  #Leukocytosis- suspect reactive  #Asymptomatic bacteriuria    7/14 BCX NGTD    7/14 UCX < 100k GBS  #Suspected ovarian ca  #Super Morbid Obesity BMI (kg/m2): 66.5  #SLE on plaquenil      Weight (kg): 181.4 (07-21-22 @ 15:32)    RECOMMENDATIONS  - RDV x 3 days, [phone]  - Please recall ID PRN    If any questions, please call or send a message on [employer] Teams  Please continue to update ID with any pertinent new laboratory or radiographic findings  Spectra [phone]

## 2022-07-22 NOTE — PROVIDER CONTACT NOTE (FALL NOTIFICATION) - SITUATION
Pt was seen at 20:55, jones was removed. Pt A&O x 4, self care. Pt stated she wanted to go to the bathroom, used the bathroom, after getting up off the toilet, pt felt weak and fell on her buttocks

## 2022-07-22 NOTE — DISCHARGE NOTE PROVIDER - NSDCCPCAREPLAN_GEN_ALL_CORE_FT
PRINCIPAL DISCHARGE DIAGNOSIS  Diagnosis: Suspected malignant neoplasm of ovary  Assessment and Plan of Treatment: It is suspected that you have a metastatic process. You went for biopsy of lung lesion with IR. Please follow up with heme/onc to continue further workup and for possible treatment.      SECONDARY DISCHARGE DIAGNOSES  Diagnosis: Nausea with vomiting  Assessment and Plan of Treatment: Likely secondary to hypercalcemia and metastatic process. Will send home with anti nausea meds as needed.    Diagnosis: Leukocytosis  Assessment and Plan of Treatment: This is likely reactive to the metastatic process.    Diagnosis: Hypercalcemia  Assessment and Plan of Treatment: Please follow up with your PCP and heme/onc specialist closely to follow your calcium levels. It was treated with IV fluids and biphosphonate. Your levels improved. Continue to monitor.

## 2022-07-22 NOTE — PROGRESS NOTE ADULT - ASSESSMENT
Pt is a 58 y/o F with PMHx of lupus, hypothyroidism, antiphospholipid syndrome, HTN, and anemia who presented to the ER complaining of diffuse pelvic pain and back pain x 3 weeks associated with decreased appetite and vomiting over the last 8 days.    # COVID exposed 7/16;  has COVID  NOW COVID + 7/21  full isolation for 10 days (7/21-7/31)  MAB not available  pt did not want to stay for 3 days of RDV  I checked drug interactions for paxlovid - none, if use dilaudid for pain  pt agreeable to paxlovid -> sent Rx to pharm for 5 days  pt on RA  pt had a slight cough a few days ago, but now feeling better (not much COVID symp now)  this is NOT why wbc high    # hypercalcemia - this was likely the cause of the vomiting and decr appetite  iPTH < 20 -> hyperpara RULED OUT  likely malign in origin - prob lymphoma, which would make it amenable to steroids  zofran 4mg iv q6 prn n/v - can use zofran po at home  Ca a little better from admission, but still high  s/p Pamidronate 90mg iv x1  IVFs: c/w /hr til d/c  highly unlikely to be sarcoid - no need to check 1,25 diOH Vit D -> would just wait on bx results  f/u PTHrP  25 OH Vit D 74 (upper limit of nl)    # suspect malignancy: b/l adnexal lesions; paraaortic LN; 15+ pulm nodules; NO B symptoms  CT C: 15+ pulm nod up to 1.7cm - IR s/p bx of lung nod 7/21  f/u path as outpt  CT A/P: bilateral adnexal hypodensities, para-aortic lymphadenopathy as well as bilateral pulm nodules  TVUS: Rt 6.6cm mixed solid-cystic structure; Lt 5.6 cm heterog, solid structure w/ doppler flow   H/O noted  Gyn/Onc noted  : 33 (nl); CA 19-9: 19 (nl); CEA 5.5; ; HCG < 0.6; S preg neg; ESR 80; uric acid 6.7  Inhibin A: neg; B: slightly elevated   percocet 5/325mg po q4 prn pain -> will switch to dilaudid 2mg po q6 prn (this goes better w/ paxlovid) - sent 1wk supply to pharm    # marked leukocytosis  prob reactive and concentrated   bld cx neg x2  Ucx Grp B strp 50-99K, but seems asymp and WBC improving w/out abx: cont OFF abx  BCR/ABL neg; JAK2 neg; flow cyto neg  f/u CALR; MPL mut analysis;   consider BM Bx    # microcytic anemia  prob of malign  iron sat 10%, TIBC 314, Ferr 85 = could be iron def  no IV iron for now  FeSO4 325mg po q24  B12, Fol nl  hapto 262  f/u MMA  retic 2.7%; t red 0.3   - this is NOT hemolysis, even intramedullary; I suspect this is a marker for lymphoma    # Thrombocytosis   likely reactive; improved; down from 803 w/ fluid    # abnl LFTs mild (minor incr AP)  prob related to underlying issue above  no further w/u for now    # antiphospholipid syndrome  prob just element of lupus hx  no clots; only 1 miscarriage  can resume asa   no a/c    # lupus - does not seem active currently  c/w HCQ 200mg po q12 - OK w/ paxlovid  c/w cymbalta  d/c tramadol prn    # HTN  c/w lisinopril 40mg oral daily  c/w procardia xl 30 qd     # Hypothyroidism  c/w levothyroxine 150mg oral daily  check TSH - can f/u as outpt    # DVT ppx: resume LMWH today    # GI ppx: none    # Activity: can walk on her own w/ walker; s/p fall 7/22, but does not need SNF    # Code: full    Dispo: f/u tissue bx; f/u H/O; f/u labs above; IVFs til d/c; f/u Ca; tx pain and n/v; TSH  pt will go home today - outpt f/u w/ ONC    Prog is guarded. Pt at risk for early return to hospital.

## 2022-07-22 NOTE — DISCHARGE NOTE PROVIDER - HOSPITAL COURSE
Patient is a 58 y/o F with a pmhx of lupus, hypothyroidism, antiphospholipid syndrome, HTN, anemia who reports to the ER complaining diffuse pelvic pain and back pain x 3 weeks associated with decreased appetite and vomiting over the last 8 days. Patient describes the pain as "achy" that comes and goes, starting 3 weeks ago rating it a 10/10. In the ED, CT A/P showed bilateral adnexal hypodensities, para-aortic lymphadenopathy as well as bilateral pulm nodules, findings are suspicious for underlying metastatic ovarian   malignancy/lymphoma.. Patient denies fever, chills, chest pain, shortness of breath, blood in the vomit or stool, unintentional weight loss.     CT A/P: bilateral adnexal hypodensities, para-aortic lymphadenopathy as well as bilateral pulm nodules  TVUS: Rt 6.6cm mixed solid-cystic structure; Lt 5.6 cm heterog, solid structure w/ doppler flow   Heme onc and IR consulted for evaluation b/l adnexal lesions; paraaortic LN; 15+ pulm nodules suspicious of metastatic process. Possibly ovarian neoplasm vs lymphoma. Patient had no B symptoms.   IR s/p bx of lung nod on 7/21. Will need to f/u path report OP with heme onc. Of note, : 33 (nl); CA 19-9: 19 (nl); CEA 5.5; ; HCG < 0.6; S preg neg; ESR 80; uric acid 6.7. Inhibin A: neg; B: slightly elevated. Was giving percocet 5/325mg po q4 prn pain. Also noted marked leukocytosis (39). LIkley reactive in nature, negative blood culture, no source of infection. BCR/ABL neg; JAK2 neg; flow cyto neg. Plan to f/u CALR; MPL mut analysis;   Patient also had hypercalcemia which was likely the cause of the vomiting and decr appetite. iPTH < 20 -> hyperparathyroidism was ruled out. likely malign in origin. Ca was improving w/ fluids. gave Pamidronate 90mg iv x1.  Symptomatically treated nausea and vomiting.   Course complicated by COVID. Overall asymptomatic. Will need full isolation for 10 days (7/21-7/31). N0 steroids or RDV given. Plan to send on paxlovid.     Patient is medically stable and ready for discharge.

## 2022-07-22 NOTE — DISCHARGE NOTE PROVIDER - CARE PROVIDER_API CALL
Aleksey Ferro)  Hematology; Internal Medicine; Medical Oncology  [address] [address]  Phone: [phone]  Fax: [phone]  Follow Up Time: Routine    Landon Gil (DO)  Medicine  [address] [facility], [address] [place] [address]  Phone: [phone]  Fax: [phone]  Follow Up Time: 1 month

## 2022-07-22 NOTE — PROGRESS NOTE ADULT - PROVIDER SPECIALTY LIST ADULT
Internal Medicine
Intervent Radiology
Heme/Onc
Hospitalist
Internal Medicine
Intervent Radiology
Internal Medicine

## 2022-07-22 NOTE — PROGRESS NOTE ADULT - SUBJECTIVE AND OBJECTIVE BOX
KELSIE JUAREZ  57y  Female  ***My note supersedes ALL resident notes that I sign.  My corrections for their notes are in my note.***    I can be reached directly on [id number]. My office number is [phone]. My personal cell number is [phone].    INTERVAL EVENTS: Here for f/u of neopl lesions. Pt fell in room last night. No LOC. Pt says she went to  and legs were weak upon return. She realizes now that she should have asked for a walker and some help, but did not. She feels she did not hurt herself. Lara is also out now. Pt feels better and wants to go home.    T(F): 97.9 (07-22-22 @ 05:05), Max: 97.9 (07-22-22 @ 05:05)  HR: 84 (07-22-22 @ 13:45) (84 - 97)  BP: 175/79 (07-22-22 @ 13:45) (132/63 - 177/81)  RR: 20 (07-22-22 @ 05:05) (20 - 20)  SpO2: --    Gen: NAD  HEENT: PERRL, EOMI, mouth clr, nose clr  Neck: no nodes, no JVD, thyroid nl  lungs: clr  hrt: s1 s2 rrr no murmur  abd: soft, ND, mildly tender lower abd; no HS megaly; hard to eval for mass given body habitus  ext: no edema, no c/c  neuro: aa ox3, cn intact, can move all 4 ext    LABS:                      8.9     (    78.7   28.77 )-----------( ---------      385      ( 22 Jul 2022 11:55 )             28.1    (    22.1     134   (   96   (   66      07-22-22 @ 11:55  ----------------------               3.8   (   23   (   12                             -----                        0.8  Ca  11.0   Mg  1.8    P   --     LFT  5.8  (  0.3  (  28       07-22-22 @ 11:55  -------------------------  3.1  (  181  (  20    Culture - Blood (collected 07-15-22 @ 23:17)  Source: .Blood None  Final Report (07-21-22 @ 07:00):    No Growth Final    Culture - Blood (collected 07-15-22 @ 23:17)  Source: .Blood None  Final Report (07-21-22 @ 07:00):    No Growth Final    RADIOLOGY & ADDITIONAL TESTS:  < from: Xray Lumbar Spine AP + Lateral (07.22.22 @ 07:06) >  IMPRESSION:  1.  Suboptimal evaluation of the lower lumbar spine and sacrum. Within   limitation, no acute fracture seen.  2.  Distended transverse colon, gaseous although limited in evaluation on   a single view. Correlate clinically and consider dedicated abdominal   radiographs with supine and upright views versus CT.    < end of copied text >    MEDICATIONS:  hydroxychloroquine 200 milliGRAM(s) Oral two times a day    aspirin  chewable 81 milliGRAM(s) Oral daily  chlorhexidine 4% Liquid 1 Application(s) Topical <User Schedule>  DULoxetine 60 milliGRAM(s) Oral daily  enoxaparin Injectable 40 milliGRAM(s) SubCutaneous every 24 hours  levothyroxine 150 MICROGram(s) Oral daily  lisinopril 40 milliGRAM(s) Oral daily  melatonin 5 milliGRAM(s) Oral at bedtime  NIFEdipine XL 30 milliGRAM(s) Oral daily  ondansetron Injectable 8 milliGRAM(s) IV Push every 8 hours PRN  oxycodone    5 mG/acetaminophen 325 mG 1 Tablet(s) Oral every 4 hours PRN  sodium chloride 0.9%. 1000 milliLiter(s) IV Continuous <Continuous>

## 2022-07-22 NOTE — DISCHARGE NOTE NURSING/CASE MANAGEMENT/SOCIAL WORK - NSDCPEFALRISK_GEN_ALL_CORE
For information on Fall & Injury Prevention, visit: [email or web address] OR  [email or web address] OR  https://www.cdc.gov/steadi/patient.html

## 2022-07-22 NOTE — DISCHARGE NOTE PROVIDER - PROVIDER TOKENS
PROVIDER:[TOKEN:[[id number]],FOLLOWUP:[Routine]],PROVIDER:[TOKEN:[[id number]],FOLLOWUP:[1 month]]

## 2022-07-26 PROBLEM — M32.9 SYSTEMIC LUPUS ERYTHEMATOSUS, UNSPECIFIED: Chronic | Status: ACTIVE | Noted: 2022-01-01

## 2022-07-26 PROBLEM — E03.9 HYPOTHYROIDISM, UNSPECIFIED: Chronic | Status: ACTIVE | Noted: 2022-01-01

## 2022-07-26 PROBLEM — D50.9 IRON DEFICIENCY ANEMIA, UNSPECIFIED: Chronic | Status: ACTIVE | Noted: 2022-01-01

## 2022-07-27 NOTE — CDI QUERY NOTE - NSCDIOTHERTXTBX_GEN_ALL_CORE_HH
CLINICAL INDICATORS    7/21 Cytology FNA report: … Lung nodule, left upper lobe, CT-guided core bx, Final Diagnosis:  LUNG NODULE, LEFT UPPER LOBE, CT-GUIDED BIOPSY AND IMPRINTS:  POSITIVE FOR MALIGNANT CELLS, Adenocarcinoma …    7/22 Discharge Note Provider: … PRINCIPAL DISCHARGE DIAGNOSIS: Diagnosis: Suspected malignant neoplasm of ovary …      Based on your professional judgment and the clinical indicators, please clarify if the pathology report of adenocarcinoma of the left upper lobe lung nodule can be further specified as:    •   Suspected metastatic ovarian cancer with metastasis to the left upper lobe lung    •   Other (please specify):    •   Clinically unable to further specify the Cytology FNA report of  adenocarcinoma of the       left upper lobe lung nodule      Thank you,  [place], RN, BSN, CCDS, [facility]  [phone]

## 2022-07-30 NOTE — ED PROVIDER NOTE - CLINICAL SUMMARY MEDICAL DECISION MAKING FREE TEXT BOX
Patient with increased weakness in the setting of newly diagnosed ovarian cancer and likely lymphoma.  Patient with decreased appetite found with elevated BUN and creatinine.  Previous BUN and creatinine was 12/0.8.  Patient also with markedly elevated white blood cell count.  Was elevated on prior admission and work-up initiated.  I discussed case with hospitalist, Dr. Julio who spoke with Dr. Serrano to obtain approval for admission north given patient will require multiple specialists.  Patient also interested in PT possibly, STR as her  has been unable to assist her at home secondary to recent surgery.

## 2022-07-30 NOTE — H&P ADULT - ASSESSMENT
58 y/o Female morbidily obese .Patient was unable to lift herself off floor but was unable to get up. patient's   is unable to lift patient due to recent cataract surgery . Pt  with a pmhx of lupus, hypothyroidism, antiphospholipid syndrome, HTN, anemia who recently dx with metastatic cancer , hypercalcemia, lymphoma , covid treated with paxlovid presents to the ED with increased weakness and falling at home. no head injury or headaches. . no vomiting, diarrhea, abdominal pain or back pain. no sob, chest pain or weight loss. no rashes    unable to ambulate:  PT  GOYO (acute kidney injury). /dehydration  IVF@ 100ml/h     Dx Hypomagnesemia.  AM labs and mag level     Dx Leukocytosis.   possibly from ovarian CA  hem/onc  AM lab  srine studies   UA  Secondary Discharge Dx 2019 novel coronavirus disease Pt completed op paxlovid    DX hyponatremia:  ivf With NS  continue home medications  and pain meds prn 56 y/o Female morbidily obese is admitted for the treatment of GOYO / debility.     #Debility - multifactorial possibly due to dehydration / malignancy / recent covid infection  #GOYO (acute kidney injury). /dehydration  PT  IVF@ 100ml/h  trend renal function  f.u urine studies    #Hypomagnesemia.  s/p repletion in ED  AM labs and mag level    #Leukocytosis, increased from prior- due to suspected malignancy lymphoma?  #possibe ovarian CA w/ mets  #anemia   transfer to [facility] for further workup including heme onc, endorsed pt to Dr. Rivera, medical admitting resident, who is aware of patient.   hem/onc consult pending  AM lab  f/u urine analysis    #COVID - pt is NAD, saturating well on RA, s/p rx  pt is vaccinated for covid, pts symptoms started [id number] days ago  symptoms have resolved  Pt completed op paxlovid  cont isolation    DX hyponatremia:  ivf With NS    continue home medications  and pain meds prn

## 2022-07-30 NOTE — ED PROVIDER NOTE - ATTENDING APP SHARED VISIT CONTRIBUTION OF CARE
Ate dinner in her room and fluids encouraged d/t b/p 91/52. Does not attend groups in afternoon. Received risperdal consta 12.5 mg from LPN  And explained  will need every 14 days. 56 yo F PMHx noted presents from home via EMS with increased weakness, decreased po intake.  Pt states she was recently admitted after eval in ED revealed ovarian cancer and possibly lymphoma.  Pt underwent biopsy and awaiting results.  While admitted pt COVID positive and had slight cough.  She completed Paxlovid. Today pt slid to floor due to weakness.  Her spouse unable to help her up as he had recent eye surgery. no fevers, no abd pain.  On exam pt in NAD AAO x 3, Lungs cta b/l no wrr, abd soft nt nd, no edema

## 2022-07-30 NOTE — ED PROVIDER NOTE - CARE PLAN
Principal Discharge DX:	GOYO (acute kidney injury)  Secondary Diagnosis:	Hypomagnesemia  Secondary Diagnosis:	Leukocytosis  Secondary Diagnosis:	2019 novel coronavirus disease (COVID-19)   1

## 2022-07-30 NOTE — H&P ADULT - NSHPLABSRESULTS_GEN_ALL_CORE
9.8    49.00 )-----------( 398      ( 30 Jul 2022 20:37 )             30.3       07-30    133<L>  |  92<L>  |  28<H>  ----------------------------<  74  4.2   |  20  |  1.8<H>    Ca    8.6      30 Jul 2022 20:37  Mg     1.7     07-30    TPro  6.4  /  Alb  3.4<L>  /  TBili  0.5  /  DBili  x   /  AST  73<H>  /  ALT  27  /  AlkPhos  378<H>  07-30                      Lactate Trend            CAPILLARY BLOOD GLUCOSE            Culture Results:   No Growth Final (07-15 @ 23:17)  Culture Results:   No Growth Final (07-15 @ 23:17)  Culture Results:   50,000 - 99,000 CFU/mL Streptococcus agalactiae (Group B) isolated  Group B streptococci are susceptible to ampicillin,  penicillin and cefazolin, but may be resistant to  erythromycin and clindamycin.  Recommendations for intrapartum prophylaxis for Group B  streptococci are penicillin or ampicillin. (07-14 @ 22:50)

## 2022-07-30 NOTE — H&P ADULT - HISTORY OF PRESENT ILLNESS
58 y/o Female morbidily obese .Patient was unable to lift herself off floor but was unable to get up. patient's   is unable to lift patient due to recent cataract surgery . Pt  with a pmhx of lupus, hypothyroidism, antiphospholipid syndrome, HTN, anemia who recently dx with metastatic cancer , hypercalcemia, lymphoma , covid treated with paxlovid presents to the ED with increased weakness and falling at home. no head injury or headaches. . no vomiting, diarrhea, abdominal pain or back pain. no sob, chest pain or weight loss. no rashes  58 y/o Female Pt  with a pmhx of lupus, hypothyroidism, antiphospholipid syndrome, HTN, anemia who recently dx with metastatic cancer , hypercalcemia, lymphoma , covid treated with paxlovid and morbidily obese. Presents to the ED with increased weakness and falling at home. no head injury or headaches. Patient was unable to lift herself off floor but was unable to get up. patient's   is unable to lift patient due to recent cataract surgery. Pt was seen and examined at bedside, pt denies vomiting, diarrhea, abdominal pain or back pain. no sob, chest pain or weight loss. no rashes  58 y/o Female Pt  with a pmhx of lupus, hypothyroidism, antiphospholipid syndrome, HTN, anemia who recently dx with metastatic cancer , hypercalcemia, lymphoma , covid treated with paxlovid and morbidily obese. Presents to the ED with increased weakness and falling at home. no head injury or headaches. Patient was unable to lift herself off floor but was unable to get up. patient's   is unable to lift patient due to recent cataract surgery. In the ED, pt was noted to have an elevated WBC count of 49, increased from 28.77 from prior, GOYO noted, pt is covid positive however was recently rx with paxlovid. Pt was seen and examined at bedside, pt denies vomiting, diarrhea, abdominal pain or back pain. no sob, chest pain or weight loss. no rashes

## 2022-07-30 NOTE — ED PROVIDER NOTE - PHYSICAL EXAMINATION
Vital Signs: I have reviewed the initial vital signs.  Constitutional: well-nourished, no acute distress, normocephalic  Eyes: PERRLA, EOMI, clear conjunctiva  ENT: MMM, no JVD, no nasal congestion  Cardiovascular: regular rate, regular rhythm, no murmur appreciated  Respiratory: unlabored respiratory effort, clear to auscultation bilaterally  Gastrointestinal: soft, non-tender, non-distended  abdomen, no pulsatile mass  Musculoskeletal: supple neck, b/l lymphadema lower extremities, no bony tenderness  Integumentary: warm, dry, no rash  Neurologic: awake, alert, cranial nerves II-XII grossly intact, extremities’ motor and sensory functions grossly intact, no focal deficits, GCS 15

## 2022-07-30 NOTE — H&P ADULT - NSHPPHYSICALEXAM_GEN_ALL_CORE
GENERAL:  56y/o Female morbidily obese, NAD, resting comfortably.  HEAD:  Atraumatic, Normocephalic  EYES: EOMI, PERRLA, conjunctiva and sclera clear  NECK: Supple, No JVD, no cervical lymphadenopathy, non-tender  CHEST/LUNG: Clear to auscultation bilaterally; No wheeze, rhonchi, or rales  HEART: Regular rate and rhythm; S1&S2  ABDOMEN: Soft, Nontender, Nondistended x 4 quadrants; Bowel sounds present  EXTREMITIES:   Peripheral Pulses Present, No clubbing, no cyanosis, or no edema, no calf tenderness  PSYCH: AAOx3, cooperative, appropriate  NEUROLOGY: WNL  SKIN: WNL GENERAL:  58y/o Female morbidly obese, NAD, resting comfortably.  HEAD:  Atraumatic, Normocephalic  EYES: EOMI, PERRLA, conjunctiva and sclera clear  NECK: Supple, No JVD, no cervical lymphadenopathy, non-tender  CHEST/LUNG: Clear to auscultation bilaterally; No wheeze, rhonchi, or rales  HEART: Regular rate and rhythm; S1&S2  ABDOMEN: Soft, Nontender, Nondistended x 4 quadrants; Bowel sounds present  EXTREMITIES:   Peripheral Pulses Present, No clubbing, no cyanosis, or no edema, no calf tenderness  PSYCH: AAOx3, cooperative, appropriate  NEUROLOGY: WNL  SKIN: WNL

## 2022-07-30 NOTE — ED PROVIDER NOTE - OBJECTIVE STATEMENT
58 y/o F with a pmhx of lupus, hypothyroidism, antiphospholipid syndrome, HTN, anemia who recently dx with metastatic cancer , hypercalcemia, lymphoma , covid treated with paxlovid presents to the ED with increased weakness and falling at home. no head injury or headaches. patient was unable to lift herself off floor but was unable. patient  is unable to lift patient due to recent cataract surgery. no vomiting, diarrhea, abdominal pain or back pain. no sob, chest pain or weight loss. no rashes. no vag bleeding or discharge

## 2022-07-31 PROBLEM — Z00.00 ENCOUNTER FOR PREVENTIVE HEALTH EXAMINATION: Status: ACTIVE | Noted: 2022-01-01

## 2022-07-31 NOTE — PROGRESS NOTE ADULT - ASSESSMENT
56 y/o F with PMHx of lupus, hypothyroidism, antiphospholipid syndrome, HTN, and anemia who was recently admitted at [facility] for diffuse pelvic pain and back pain x 3 weeks > found to have a probable Ovarian CA with apparent Met to Lung (AdenoCA on lung mass bx). Was treated for Hypercalcemia and associated N/V. Had contracted COVID (first positive PCR 7/21) > treated with 5 days of Paxlovid outpatient.   Now returns with persistent 'Generalized weakness' (like she had at the time of last discharge) that led to a fall.   Was on ground for 15 mins before EMS helped her up.     Was supposed to meet Oncologist Dr. Ferro outpatient but presented to ER before her first ever appointment.    #Fall/Debility:   Patient has had similar 'Generalized weakness' with every covid infection.   Symptoms spontaneously improve about 1-2 weeks after recovering from Covid  c/w PT here. Patient will think about going to STR this time?   Check Orthostatics, CK level.    #Covid:  First positive PCR 7/21. Still positive on 7/31  (known phenomenon of Recrudescence with Paxlovid?)  Check Markers on 7/31 AM.   Only symptoms currently are the above detailed 'Generalized weakness'. On Room air. No acute findings on CXR.    #GOYO / Mild HAGMA:   Unclear etiology. Uric Acid 11.   May be 2/2 high tumor burden (solid tumor vs hematologic) (WBC 50k with neutrophilic predominance)  Allopurinol 300 BID. NS @ 100. US Kidney Bladder. Hold home lisinopril.  Onc eval for Rasburicase? (especially if planning chemo)    #WBC 50k with neutrophilic predominance:  Worse from roughly 20k last admission.   Leukemoid reaction vs MDS vs Hematologic malignancy  Onc to review PBS. Consider BM bx??   Blood culture, UA, procalc to r/o acute infection (doubt)    #Alk phos elevation: RUQ US. trend    #b/l LE 1+ edema: New c/f last admission. LE duplex    # suspect malignancy: b/l adnexal lesions; paraaortic LN; 15+ pulm nodules; NO B symptoms  from 7/14 admission w/u:   CT Chest: 15+ pulm nod up to 1.7cm -s/p IR bx of lung nod 7/21. 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  CT A/P: bilateral adnexal hypodensities, para-aortic lymphadenopathy as well as bilateral pulm nodules  TVUS: Rt 6.6cm mixed solid-cystic structure; Lt 5.6 cm heterog, solid structure w/ doppler flow   : 33 (nl); CA 19-9: 19 (nl); CEA 5.5; ; HCG < 0.6; S preg neg; ESR 80; uric acid 6.7  Inhibin A: neg; B: slightly elevated     Pending Oncology eval. May involve Gyn Onc if needed?   Pain control : was changed from old percocet to dilaudid last admission (d/t concurrent Paxlovid at the time).   c/w Dilaudid 2mg po q6 prn. Monitor BMs       # microcytic hypochromic anemia  prob of malign  Last admission:   iron sat 10%, TIBC 314, Ferr 85 = could be iron def  no IV iron for now  FeSO4 325mg po q24  B12, Fol nl  hapto 262  MMA level normal  retic 2.7%; t red 0.3   - this is NOT hemolysis, even intramedullary; I suspect this is a marker for lymphoma      # antiphospholipid syndrome  prob just element of lupus hx  no clots; only 1 miscarriage  On ASA  no a/c    # lupus - does not seem active currently  c/w HCQ 200mg po q12   c/w cymbalta    # HTN  held home lisinopril 40mg oral daily  c/w procardia xl 30 qd     # Hypothyroidism  c/w levothyroxine 150mg oral daily    # DVT ppx: hep sq TID until goyo resolves.    # GI ppx: none    # Code: full    Handoff: GOYO treatment. Onc w/u.     Prog is guarded.    56 y/o F with PMHx of lupus, hypothyroidism, antiphospholipid syndrome, HTN, and anemia who was recently admitted at [facility] for diffuse pelvic pain and back pain x 3 weeks > found to have a probable Ovarian CA with apparent Met to Lung (AdenoCA on lung mass bx). Was treated for Hypercalcemia and associated N/V. Had contracted COVID (first positive PCR 7/21) > treated with 5 days of Paxlovid outpatient.   Now returns with persistent 'Generalized weakness' (like she had at the time of last discharge) that led to a fall.   Was on ground for 15 mins before EMS helped her up.     Was supposed to meet Oncologist Dr. Ferro outpatient but presented to ER before her first ever appointment.    #Fall/Debility:   Patient has had similar 'Generalized weakness' with every covid infection.   Symptoms spontaneously improve about 1-2 weeks after recovering from Covid  c/w PT here. Patient will think about going to STR this time?   Check Orthostatics, CK level.    #Covid:  First positive PCR 7/21. Still positive on 7/31  (known phenomenon of Recrudescence with Paxlovid?)  Check Markers on 7/31 AM.   Only symptoms currently are the above detailed 'Generalized weakness'. On Room air. No acute findings on CXR.    #GOYO / Mild HAGMA:   Unclear etiology. Uric Acid 11.   May be 2/2 high tumor burden (solid tumor vs hematologic) (WBC 50k with neutrophilic predominance)  Allopurinol 300 BID. NS @ 100. US Kidney Bladder. Hold home lisinopril.  Onc eval for Rasburicase? (especially if planning chemo)  Monitor Mg, Phos, calcium    #WBC 50k with neutrophilic predominance:  Worse from roughly 20k last admission.   Leukemoid reaction vs MDS vs Hematologic malignancy  Onc to review PBS. Consider BM bx??   Blood culture, UA, procalc to r/o acute infection (doubt)    #Alk phos elevation: RUQ US. trend    #b/l LE 1+ edema: New c/f last admission. LE duplex    # suspect malignancy: b/l adnexal lesions; paraaortic LN; 15+ pulm nodules; NO B symptoms  from 7/14 admission w/u:   CT Chest: 15+ pulm nod up to 1.7cm -s/p IR bx of lung nod 7/21. 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  CT A/P: bilateral adnexal hypodensities, para-aortic lymphadenopathy as well as bilateral pulm nodules  TVUS: Rt 6.6cm mixed solid-cystic structure; Lt 5.6 cm heterog, solid structure w/ doppler flow   : 33 (nl); CA 19-9: 19 (nl); CEA 5.5; ; HCG < 0.6; S preg neg; ESR 80; uric acid 6.7  Inhibin A: neg; B: slightly elevated     Pending Oncology eval. May involve Gyn Onc if needed?   Pain control : was changed from old percocet to dilaudid last admission (d/t concurrent Paxlovid at the time).   c/w Dilaudid 2mg po q6 prn. Monitor BMs       # microcytic hypochromic anemia  prob of malign  Last admission:   iron sat 10%, TIBC 314, Ferr 85 = could be iron def  no IV iron for now  FeSO4 325mg po q24  B12, Fol nl  hapto 262  MMA level normal  retic 2.7%; t red 0.3   - this is NOT hemolysis, even intramedullary; I suspect this is a marker for lymphoma      # antiphospholipid syndrome  prob just element of lupus hx  no clots; only 1 miscarriage  On ASA  no a/c    # lupus - does not seem active currently  c/w HCQ 200mg po q12   c/w cymbalta    # HTN  held home lisinopril 40mg oral daily  c/w procardia xl 30 qd     # Hypothyroidism  c/w levothyroxine 150mg oral daily    # DVT ppx: hep sq TID until goyo resolves.    # GI ppx: none    # Code: full    Handoff: GOYO treatment. Onc w/u.     Prog is guarded.

## 2022-07-31 NOTE — PROGRESS NOTE ADULT - SUBJECTIVE AND OBJECTIVE BOX
S: No sob/n/v/f/c. low appetite. (d/t covid)  pelvic pain controlled  in bed since admission. awaiting PT (given fall risk)  genetralized weakness      All other pertinent ROS negative.      07-31-22 @ 07:01  -  07-31-22 @ 21:22  --------------------------------------------------------  IN: 210 mL / OUT: 0 mL / NET: 210 mL      Vital Signs Last 24 Hrs  T(C): 36.4 (31 Jul 2022 12:10), Max: 36.9 (31 Jul 2022 00:00)  T(F): 97.5 (31 Jul 2022 12:10), Max: 98.4 (31 Jul 2022 00:00)  HR: 97 (31 Jul 2022 12:10) (88 - 98)  BP: 140/83 (31 Jul 2022 12:10) (110/54 - 140/83)  BP(mean): --  RR: 18 (31 Jul 2022 12:10) (18 - 18)  SpO2: 95% (31 Jul 2022 12:10) (95% - 99%)    Parameters below as of 31 Jul 2022 00:00  Patient On (Oxygen Delivery Method): room air      PHYSICAL EXAM:    Constitutional: NAD, awake and alert, Obese  HEENT: PERR, EOMI, Normal Hearing, MMM  Neck: Soft and supple, No LAD, No JVD  Respiratory: Breath sounds are clear bilaterally, No wheezing, rales or rhonchi  Cardiovascular: S1 and S2, regular rate and rhythm, no Murmurs, gallops or rubs  Gastrointestinal: Bowel Sounds present, soft, nontender, nondistended, no guarding, no rebound  Extremities: b/l LE edema       MEDICATIONS:  MEDICATIONS  (STANDING):  allopurinol 300 milliGRAM(s) Oral two times a day  aspirin  chewable 81 milliGRAM(s) Oral daily  dextrose 5%. 1000 milliLiter(s) (50 mL/Hr) IV Continuous <Continuous>  dextrose 5%. 1000 milliLiter(s) (100 mL/Hr) IV Continuous <Continuous>  dextrose 50% Injectable 25 Gram(s) IV Push once  dextrose 50% Injectable 12.5 Gram(s) IV Push once  dextrose 50% Injectable 25 Gram(s) IV Push once  DULoxetine 60 milliGRAM(s) Oral daily  ferrous    sulfate 325 milliGRAM(s) Oral daily  glucagon  Injectable 1 milliGRAM(s) IntraMuscular once  heparin   Injectable 5000 Unit(s) SubCutaneous three times a day  hydroxychloroquine 200 milliGRAM(s) Oral two times a day  levothyroxine 150 MICROGram(s) Oral daily  loperamide 2 milliGRAM(s) Oral once  magnesium sulfate  IVPB 2 Gram(s) IV Intermittent once  NIFEdipine XL 30 milliGRAM(s) Oral daily  nystatin Ointment 1 Application(s) Topical two times a day  sodium chloride 0.9%. 1000 milliLiter(s) (100 mL/Hr) IV Continuous <Continuous>  sodium phosphate IVPB 15 milliMole(s) IV Intermittent once      LABS: All Labs Reviewed:                        9.9    51.95 )-----------( 380      ( 31 Jul 2022 18:40 )             29.3     07-31    136  |  94<L>  |  26<H>  ----------------------------<  49<LL>  4.2   |  18  |  1.4    Ca    8.3<L>      31 Jul 2022 18:40  Phos  1.4     07-31  Mg     1.7     07-31    TPro  5.9<L>  /  Alb  3.0<L>  /  TBili  0.5  /  DBili  x   /  AST  58<H>  /  ALT  26  /  AlkPhos  457<H>  07-31          Blood Culture:     Radiology: reviewed

## 2022-07-31 NOTE — DISCHARGE NOTE NURSING/CASE MANAGEMENT/SOCIAL WORK - NSDCPEFALRISK_GEN_ALL_CORE
For information on Fall & Injury Prevention, visit: [email or web address] OR  [email or web address] OR  https://www.cdc.gov/steadi/patient.html

## 2022-07-31 NOTE — ED ADULT NURSE NOTE - NSIMPLEMENTINTERV_GEN_ALL_ED
Implemented All Fall Risk Interventions:  [place] to call system. Call bell, personal items and telephone within reach. Instruct patient to call for assistance. Room bathroom lighting operational. Non-slip footwear when patient is off stretcher. Physically safe environment: no spills, clutter or unnecessary equipment. Stretcher in lowest position, wheels locked, appropriate side rails in place. Provide visual cue, wrist band, yellow gown, etc. Monitor gait and stability. Monitor for mental status changes and reorient to person, place, and time. Review medications for side effects contributing to fall risk. Reinforce activity limits and safety measures with patient and family.

## 2022-07-31 NOTE — DISCHARGE NOTE NURSING/CASE MANAGEMENT/SOCIAL WORK - PATIENT PORTAL LINK FT
You can access the FollowMyHealth Patient Portal offered by [facility] by registering at the following website: [email or web address]. By joining [employer]’s FollowMyHealth portal, you will also be able to view your health information using other applications (apps) compatible with our system.

## 2022-07-31 NOTE — PATIENT PROFILE ADULT - FALL HARM RISK - RISK INTERVENTIONS

## 2022-08-01 NOTE — PROGRESS NOTE ADULT - SUBJECTIVE AND OBJECTIVE BOX
Patient is a 57y old  Female who presents with a chief complaint of   HPI:   58 y/o Female Pt  with a pmhx of lupus, hypothyroidism, antiphospholipid syndrome, HTN, anemia who recently dx with metastatic cancer , hypercalcemia, lymphoma , covid treated with paxlovid and morbidily obese. Presents to the ED with increased weakness and falling at home. no head injury or headaches. Patient was unable to lift herself off floor but was unable to get up. patient's   is unable to lift patient due to recent cataract surgery. In the ED, pt was noted to have an elevated WBC count of 49, increased from 28.77 from prior, GOYO noted, pt is covid positive however was recently rx with paxlovid. Pt was seen and examined at bedside, pt denies vomiting, diarrhea, abdominal pain or back pain. no sob, chest pain or weight loss. no rashes (2022 23:37)  Patient seen and examined at bedside.    ALLERGIES:  NSAIDs (Stomach Upset)    MEDICATIONS:  allopurinol 300 milliGRAM(s) Oral two times a day  aluminum hydroxide/magnesium hydroxide/simethicone Suspension 30 milliLiter(s) Oral every 4 hours PRN  aspirin  chewable 81 milliGRAM(s) Oral daily  dextrose 5%. 1000 milliLiter(s) IV Continuous <Continuous>  dextrose 5%. 1000 milliLiter(s) IV Continuous <Continuous>  dextrose 50% Injectable 25 Gram(s) IV Push once  dextrose 50% Injectable 12.5 Gram(s) IV Push once  dextrose 50% Injectable 25 Gram(s) IV Push once  dextrose Oral Gel 15 Gram(s) Oral once PRN  DULoxetine 60 milliGRAM(s) Oral daily  ferrous    sulfate 325 milliGRAM(s) Oral daily  glucagon  Injectable 1 milliGRAM(s) IntraMuscular once  heparin   Injectable 5000 Unit(s) SubCutaneous three times a day  HYDROmorphone   Tablet 2 milliGRAM(s) Oral every 6 hours PRN  hydroxychloroquine 200 milliGRAM(s) Oral two times a day  levothyroxine 150 MICROGram(s) Oral daily  NIFEdipine XL 30 milliGRAM(s) Oral once  nystatin Ointment 1 Application(s) Topical two times a day  ondansetron    Tablet 4 milliGRAM(s) Oral every 6 hours PRN  ondansetron Injectable 4 milliGRAM(s) IV Push every 8 hours PRN  oxycodone    5 mG/acetaminophen 325 mG 1 Tablet(s) Oral every 4 hours PRN  sodium chloride 0.9%. 1000 milliLiter(s) IV Continuous <Continuous>    Vital Signs Last 24 Hrs  T(F): 97 (01 Aug 2022 12:50), Max: 97 (01 Aug 2022 12:50)  HR: 97 (01 Aug 2022 12:50) (97 - 99)  BP: 174/82 (01 Aug 2022 12:50) (166/81 - 174/82)  RR: 18 (01 Aug 2022 12:50) (18 - 18)  SpO2: 97% (01 Aug 2022 12:50) (96% - 97%)  I&O's Summary    2022 07:01  -  01 Aug 2022 07:00  --------------------------------------------------------  IN: 210 mL / OUT: 0 mL / NET: 210 mL        PHYSICAL EXAM:  General: NAD, A/O x 3  ENT: MMM  Neck: Supple, No JVD  Lungs: diminished no crackles heard   Cardio: RRR, S1/S2, No murmurs  Abdomen: Soft, Nontender, Nondistended; obese   Extremities: No cyanosis, No edema    LABS:                        9.4    54.31 )-----------( 369      ( 01 Aug 2022 06:38 )             27.9     08-01    133  |  91  |  23  ----------------------------<  87  3.2   |  23  |  1.1    Ca    8.0      01 Aug 2022 06:38  Phos  1.3     08-01  Mg     2.0     08-01    TPro  5.7  /  Alb  2.8  /  TBili  0.7  /  DBili  x   /  AST  50  /  ALT  25  /  AlkPhos  451  08-01          CARDIAC MARKERS ( 01 Aug 2022 06:38 )  x     / x     / 161 U/L / x     / x                        POCT Blood Glucose.: 96 mg/dL (01 Aug 2022 11:49)  POCT Blood Glucose.: 105 mg/dL (01 Aug 2022 07:53)  POCT Blood Glucose.: 121 mg/dL (01 Aug 2022 06:05)  POCT Blood Glucose.: 106 mg/dL (01 Aug 2022 04:14)  POCT Blood Glucose.: 88 mg/dL (01 Aug 2022 00:59)  POCT Blood Glucose.: 80 mg/dL (2022 20:30)      Urinalysis Basic - ( 01 Aug 2022 07:50 )    Color: Yellow / Appearance: Slightly Turbid / S.013 / pH: x  Gluc: x / Ketone: Trace  / Bili: Small / Urobili: <2 mg/dL   Blood: x / Protein: 30 mg/dL / Nitrite: Negative   Leuk Esterase: Large / RBC: 21 /HPF /  /HPF   Sq Epi: x / Non Sq Epi: 5 /HPF / Bacteria: Few        COVID-19 PCR: Detected (22 @ 20:51)  COVID-19 PCR: Detected (22 @ 06:00)  COVID-19 PCR: NotDetec (22 @ 19:50)      RADIOLOGY & ADDITIONAL TESTS:    Care Discussed with Consultants/Other Providers:

## 2022-08-01 NOTE — PROGRESS NOTE ADULT - ASSESSMENT
56 y/o F with PMHx of lupus, hypothyroidism, antiphospholipid syndrome, HTN, and anemia who was recently admitted at [facility] for diffuse pelvic pain and back pain x 3 weeks > found to have a probable Ovarian CA with apparent Met to Lung (AdenoCA on lung mass bx). Was treated for Hypercalcemia and associated N/V. Had contracted COVID (first positive PCR 7/21) > treated with 5 days of Paxlovid outpatient.   Now returns with persistent 'Generalized weakness' (like she had at the time of last discharge) that led to a fall.   Was on ground for 15 mins before EMS helped her up.     Was supposed to meet Oncologist Dr. Ferro outpatient but presented to ER before her first ever appointment.    #Fall/Debility:   Patient has had similar 'Generalized weakness' with every covid infection.   Symptoms spontaneously improve about 1-2 weeks after recovering from Covid  c/w PT here. Patient will think about going to STR this time?    Orthostatics-negative lying to sitting, not done at standing , CK level.    #Covid:  First positive PCR 7/21. Still positive on 7/31  (known phenomenon of Recrudescence with Paxlovid?)  Patient needs 21 days post positive due to immunosuppression to come off precautions - end date 8/11     #GOYO / Mild HAGMA: improving   #Hypokalemia, hypophosphatemia   Unclear etiology. Uric Acid 11.   May be 2/2 high tumor burden (solid tumor vs hematologic) (WBC 50k with neutrophilic predominance)  Allopurinol 300 BID. NS @ 100. US Kidney Bladder. Hold home lisinopril.  Onc eval for Rasburicase? (especially if planning chemo)  Monitor Mg, Phos, calcium  Repleted K an Phos    #WBC 50k with neutrophilic predominance:  Diarrhea  Worse from roughly 20k last admission.   Leukemoid reaction vs MDS vs Hematologic malignancy  Onc to review PBS. Consider BM bx??   Blood culture, UA, procalc to r/o acute infection (doubt)  C.diff negative, GI pcr pending     #Alk phos elevation: RUQ US. trend    #b/l LE 1+ edema: New c/f last admission. LE duplex    # suspect malignancy: b/l adnexal lesions; paraaortic LN; 15+ pulm nodules; NO B symptoms  from 7/14 admission w/u:   CT Chest: 15+ pulm nod up to 1.7cm -s/p IR bx of lung nod 7/21. 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  CT A/P: bilateral adnexal hypodensities, para-aortic lymphadenopathy as well as bilateral pulm nodules  TVUS: Rt 6.6cm mixed solid-cystic structure; Lt 5.6 cm heterog, solid structure w/ doppler flow   : 33 (nl); CA 19-9: 19 (nl); CEA 5.5; ; HCG < 0.6; S preg neg; ESR 80; uric acid 6.7  Inhibin A: neg; B: slightly elevated     Pending Oncology eval. May involve Gyn Onc if needed?   Pain control : was changed from old percocet to dilaudid last admission (d/t concurrent Paxlovid at the time).   c/w Dilaudid 2mg po q6 prn. Monitor BMs       # microcytic hypochromic anemia  prob of malign  Last admission:   iron sat 10%, TIBC 314, Ferr 85 = could be iron def  no IV iron for now  FeSO4 325mg po q24  B12, Fol nl  hapto 262  MMA level normal  retic 2.7%; t red 0.3   - this is NOT hemolysis, even intramedullary; possible marker for lymphoma      # antiphospholipid syndrome  prob just element of lupus hx  no clots; only 1 miscarriage  On ASA  no a/c    # lupus - does not seem active currently  c/w HCQ 200mg po q12   c/w cymbalta    # HTN  held home lisinopril 40mg oral daily  increase procardia xl to 60mg 8/1    # Hypothyroidism  c/w levothyroxine 150mg oral daily    # DVT ppx: hep sq TID until goyo resolves.    # GI ppx: none    # Code: full    Handoff: GOYO treatment. Onc w/u. , HTN control, gi pcr results     Prog is guarded.

## 2022-08-01 NOTE — PROGRESS NOTE ADULT - SUBJECTIVE AND OBJECTIVE BOX
KELSIE JUAREZ 57y Female  MRN#: [id number]   Hospital Day: 2d    SUBJECTIVE  Patient is a 57y old Female who presents with a chief complaint of weakness and falling (01 Aug 2022 15:37)  Currently admitted to medicine with the primary diagnosis of GOYO (acute kidney injury)      INTERVAL HPI AND OVERNIGHT EVENTS:  Patient was examined and seen at bedside. This morning she is resting comfortably in bed. No issues or overnight events.    OBJECTIVE  PAST MEDICAL & SURGICAL HISTORY  Lupus    Hypothyroidism    Iron deficiency anemia    No significant past surgical history      ALLERGIES:  NSAIDs (Stomach Upset)    MEDICATIONS:  STANDING MEDICATIONS  allopurinol 300 milliGRAM(s) Oral two times a day  aspirin  chewable 81 milliGRAM(s) Oral daily  dextrose 5%. 1000 milliLiter(s) IV Continuous <Continuous>  dextrose 5%. 1000 milliLiter(s) IV Continuous <Continuous>  dextrose 50% Injectable 25 Gram(s) IV Push once  dextrose 50% Injectable 12.5 Gram(s) IV Push once  dextrose 50% Injectable 25 Gram(s) IV Push once  DULoxetine 60 milliGRAM(s) Oral daily  ferrous    sulfate 325 milliGRAM(s) Oral daily  glucagon  Injectable 1 milliGRAM(s) IntraMuscular once  heparin   Injectable 5000 Unit(s) SubCutaneous three times a day  hydroxychloroquine 200 milliGRAM(s) Oral two times a day  levothyroxine 150 MICROGram(s) Oral daily  NIFEdipine XL 30 milliGRAM(s) Oral once  nystatin Ointment 1 Application(s) Topical two times a day  potassium chloride   Powder 40 milliEquivalent(s) Oral once  potassium phosphate / sodium phosphate Powder (PHOS-NaK) 1 Packet(s) Oral once  sodium chloride 0.9%. 1000 milliLiter(s) IV Continuous <Continuous>    PRN MEDICATIONS  aluminum hydroxide/magnesium hydroxide/simethicone Suspension 30 milliLiter(s) Oral every 4 hours PRN  dextrose Oral Gel 15 Gram(s) Oral once PRN  HYDROmorphone   Tablet 2 milliGRAM(s) Oral every 6 hours PRN  ondansetron    Tablet 4 milliGRAM(s) Oral every 6 hours PRN  ondansetron Injectable 4 milliGRAM(s) IV Push every 8 hours PRN  oxycodone    5 mG/acetaminophen 325 mG 1 Tablet(s) Oral every 4 hours PRN      VITAL SIGNS: Last 24 Hours  T(C): 36.1 (01 Aug 2022 12:50), Max: 36.1 (01 Aug 2022 12:50)  T(F): 97 (01 Aug 2022 12:50), Max: 97 (01 Aug 2022 12:50)  HR: 97 (01 Aug 2022 12:50) (97 - 99)  BP: 174/82 (01 Aug 2022 12:50) (166/81 - 174/82)  BP(mean): --  RR: 18 (01 Aug 2022 12:50) (18 - 18)  SpO2: 97% (01 Aug 2022 12:50) (96% - 97%)    LABS:                        9.4    54.31 )-----------( 369      ( 01 Aug 2022 06:38 )             27.9     08    133<L>  |  91<L>  |  23<H>  ----------------------------<  87  3.2<L>   |  23  |  1.1    Ca    8.0<L>      01 Aug 2022 06:38  Phos  1.3       Mg     2.0     08    TPro  5.7<L>  /  Alb  2.8<L>  /  TBili  0.7  /  DBili  x   /  AST  50<H>  /  ALT  25  /  AlkPhos  451<H>  08      Urinalysis Basic - ( 01 Aug 2022 07:50 )    Color: Yellow / Appearance: Slightly Turbid / S.013 / pH: x  Gluc: x / Ketone: Trace  / Bili: Small / Urobili: <2 mg/dL   Blood: x / Protein: 30 mg/dL / Nitrite: Negative   Leuk Esterase: Large / RBC: 21 /HPF /  /HPF   Sq Epi: x / Non Sq Epi: 5 /HPF / Bacteria: Few        Creatine Kinase, Serum: 161 U/L (22 @ 06:38)      CARDIAC MARKERS ( 01 Aug 2022 06:38 )  x     / x     / 161 U/L / x     / x              PHYSICAL EXAM:  CONSTITUTIONAL: pt is bed bound, dont move much, No acute distress, AAOx3  HEAD: Atraumatic, normocephalic  EYES: EOM intact, PERRLA, conjunctiva and sclera clear  ENT: Supple, no masses, no thyromegaly, no bruits, no JVD; moist mucous membranes  PULMONARY: Clear to auscultation bilaterally; no wheezes, rales, or rhonchi  CARDIOVASCULAR: Regular rate and rhythm  GASTROINTESTINAL: Soft, non-tender, non-distended; bowel sounds present  MUSCULOSKELETAL: 2+ peripheral pulses; no clubbing, no cyanosis, no edema  NEUROLOGY: non-focal  SKIN: No rashes or lesions; warm and dry

## 2022-08-01 NOTE — PROGRESS NOTE ADULT - ASSESSMENT
56 y/o F with PMHx of lupus, hypothyroidism, antiphospholipid syndrome, HTN, and anemia who was recently admitted at [facility] for diffuse pelvic pain and back pain x 3 weeks > found to have a probable Ovarian CA with apparent Met to Lung (AdenoCA on lung mass bx). Was treated for Hypercalcemia and associated N/V. Had contracted COVID (first positive PCR 7/21) > treated with 5 days of Paxlovid outpatient.   Now returns with persistent 'Generalized weakness' (like she had at the time of last discharge) that led to a fall.   Was on ground for 15 mins before EMS helped her up.     Was supposed to meet Oncologist Dr. Ferro outpatient but presented to ER before her first ever appointment.    #Fall/Debility  Patient has had similar 'Generalized weakness' with every covid infection.   Symptoms spontaneously improve about 1-2 weeks after recovering from Covid  c/w PT here   Orthostatics-negative lying to sitting, not done at standing , CK level.    #Covid:  First positive PCR 7/21. Still positive on 7/31  (known phenomenon of Recrudescence with Paxlovid?)  Patient needs 21 days post positive due to immunosuppression to come off precautions - end date 8/11     #GOYO / Mild HAGMA: improving   #Hypokalemia, hypophosphatemia   Unclear etiology. Uric Acid 11.   May be 2/2 high tumor burden (solid tumor vs hematologic) (WBC 50k with neutrophilic predominance)  Allopurinol 300 BID. NS @ 100. US Kidney Bladder. Hold home lisinopril.  Onc eval for Rasburicase? (especially if planning chemo)  Monitor Mg, Phos, calcium  Repleted K an Phos    #WBC 50k with neutrophilic predominance:  Diarrhea  Worse from roughly 20k last admission.   Leukemoid reaction vs MDS vs Hematologic malignancy  Onc contacted  Blood culture, UA, procalc to r/o acute infection (doubt)  C.diff negative, GI pcr pending     #Alk phos elevation: RUQ US. trend    #b/l LE 1+ edema: New c/f last admission. LE duplex    # suspect adenocarcinoma of lungs with mets  from 7/14 admission w/u:   CT Chest: 15+ pulm nod up to 1.7cm -s/p IR bx of lung nod 7/21. 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  CT A/P: bilateral adnexal hypodensities, para-aortic lymphadenopathy as well as bilateral pulm nodules  TVUS: Rt 6.6cm mixed solid-cystic structure; Lt 5.6 cm heterog, solid structure w/ doppler flow   : 33 (nl); CA 19-9: 19 (nl); CEA 5.5; ; HCG < 0.6; S preg neg; ESR 80; uric acid 6.7  Inhibin A: neg; B: slightly elevated   - Heme/onc waiting for the IHC stain results    Pain control : was changed from old percocet to dilaudid last admission (d/t concurrent Paxlovid at the time).   c/w Dilaudid 2mg po q6 prn. Monitor BMs       # microcytic hypochromic anemia  prob of malign  no IV iron for now  FeSO4 325mg po q24  B12, Fol nl  hapto 262  MMA level normal  retic 2.7%; t red 0.3   - this is NOT hemolysis, even intramedullary; possible marker for lymphoma      # antiphospholipid syndrome  prob just element of lupus hx  no clots; only 1 miscarriage  On ASA  no a/c    # lupus - does not seem active currently  c/w HCQ 200mg po q12   c/w cymbalta    # HTN  held home lisinopril 40mg oral daily  increase procardia xl to 60mg 8/1    # Hypothyroidism  c/w levothyroxine 150mg oral daily    # DVT ppx: hep sq TID until goyo resolves.    # GI ppx: none    # Code: full    Handoff: GOYO treatment. Onc w/u. , HTN control, gi pcr results

## 2022-08-02 NOTE — PROGRESS NOTE ADULT - ASSESSMENT
56 y/o F with PMHx of lupus, hypothyroidism, antiphospholipid syndrome, HTN, and anemia who was recently admitted at [facility] for diffuse pelvic pain and back pain x 3 weeks > found to have a probable Ovarian CA with apparent Met to Lung (AdenoCA on lung mass bx). Was treated for Hypercalcemia and associated N/V. Had contracted COVID (first positive PCR 7/21) > treated with 5 days of Paxlovid outpatient.   Now returns with persistent 'Generalized weakness' (like she had at the time of last discharge) that led to a fall.   Was on ground for 15 mins before EMS helped her up.     Was supposed to meet Oncologist Dr. Ferro outpatient but presented to ER before her first ever appointment.    #Fall/Debility:   Patient has had similar 'Generalized weakness' with every covid infection.   Symptoms spontaneously improve about 1-2 weeks after recovering from Covid  c/w PT here. Patient will think about going to STR this time?    Orthostatics-negative lying to sitting, not done at standing , CK level.    #Covid:  First positive PCR 7/21. Still positive on 7/31  (known phenomenon of Recrudescence with Paxlovid?)  Patient needs 21 days post positive due to immunosuppression to come off precautions - end date 8/11     #GOYO / Mild HAGMA: improving   #Hypokalemia, hypophosphatemia   Unclear etiology. Uric Acid 11.   May be 2/2 high tumor burden (solid tumor vs hematologic) (WBC 50k with neutrophilic predominance)  Allopurinol 300 BID. NS @ 100. US Kidney Bladder. Hold home lisinopril.  Onc eval for Rasburicase? (especially if planning chemo)  Monitor Mg, Phos, calcium  Repleted K an Phos    #Leukopenia with neutrophilic predominance:  Diarrhea - gi pcr and c.diff both negative   Worse from roughly 20k last admission, trending up   Leukemoid reaction vs MDS vs Hematologic malignancy  Blood culture, UA, procalc to r/o acute infection (doubt)  C.diff negative, GI pcr pending   Oncology consult     #Alk phos elevation: RUQ US. trend    #b/l LE 1+ edema: New c/f last admission. LE duplex    # suspect malignancy: b/l adnexal lesions; paraaortic LN; 15+ pulm nodules; NO B symptoms  from 7/14 admission w/u:   CT Chest: 15+ pulm nod up to 1.7cm -s/p IR bx of lung nod 7/21. 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  CT A/P: bilateral adnexal hypodensities, para-aortic lymphadenopathy as well as bilateral pulm nodules  TVUS: Rt 6.6cm mixed solid-cystic structure; Lt 5.6 cm heterog, solid structure w/ doppler flow   : 33 (nl); CA 19-9: 19 (nl); CEA 5.5; ; HCG < 0.6; S preg neg; ESR 80; uric acid 6.7  Inhibin A: neg; B: slightly elevated     Pending Oncology eval. May involve Gyn Onc if needed?   Pain control : was changed from old percocet to dilaudid last admission (d/t concurrent Paxlovid at the time).   c/w Dilaudid 2mg po q6 prn. Monitor BMs       # microcytic hypochromic anemia  prob of malign  Last admission:   iron sat 10%, TIBC 314, Ferr 85 = could be iron def  no IV iron for now  FeSO4 325mg po q24  B12, Fol nl  hapto 262  MMA level normal  retic 2.7%; t red 0.3   - this is NOT hemolysis, even intramedullary; possible marker for lymphoma      # antiphospholipid syndrome  prob just element of lupus hx  no clots; only 1 miscarriage  On ASA  no a/c    # lupus - does not seem active currently  c/w HCQ 200mg po q12   c/w cymbalta    # HTN  held home lisinopril 40mg oral daily  increase procardia xl to 60mg 8/1    # Hypothyroidism  c/w levothyroxine 150mg oral daily    # DVT ppx: hep sq TID until goyo resolves.    # GI ppx: none    # Code: full    Pending- wbc, oncology consult, electrolytes   Dispo - [facility], PT note recommends [facility]    Prog is guarded.

## 2022-08-02 NOTE — PHYSICAL THERAPY INITIAL EVALUATION ADULT - PERTINENT HX OF CURRENT PROBLEM, REHAB EVAL
58 y/o Female Pt  with a pmhx of lupus, hypothyroidism, antiphospholipid syndrome, HTN, anemia who recently dx with metastatic cancer , hypercalcemia, lymphoma , covid treated with paxlovid and morbidily obese. Presents to the ED with increased weakness and falling at home. no head injury or headaches. Patient was unable to lift herself off floor but was unable to get up. patient's   is unable to lift patient due to recent cataract surgery..

## 2022-08-02 NOTE — PROGRESS NOTE ADULT - SUBJECTIVE AND OBJECTIVE BOX
KELSIE JUAREZ 57y Female  MRN#: [id number]   Hospital Day: 3d    SUBJECTIVE  Patient is a 57y old Female who presents with a chief complaint of poor ambulation (02 Aug 2022 15:42)  Currently admitted to medicine with the primary diagnosis of GOYO (acute kidney injury)      INTERVAL HPI AND OVERNIGHT EVENTS:  Patient was examined and seen at bedside. This morning she is resting comfortably in bed. No issues or overnight events.    OBJECTIVE  PAST MEDICAL & SURGICAL HISTORY  Lupus    Hypothyroidism    Iron deficiency anemia    No significant past surgical history      ALLERGIES:  NSAIDs (Stomach Upset)    MEDICATIONS:  STANDING MEDICATIONS  allopurinol 300 milliGRAM(s) Oral two times a day  aspirin  chewable 81 milliGRAM(s) Oral daily  dextrose 5%. 1000 milliLiter(s) IV Continuous <Continuous>  dextrose 5%. 1000 milliLiter(s) IV Continuous <Continuous>  dextrose 50% Injectable 25 Gram(s) IV Push once  dextrose 50% Injectable 12.5 Gram(s) IV Push once  dextrose 50% Injectable 25 Gram(s) IV Push once  DULoxetine 60 milliGRAM(s) Oral daily  ferrous    sulfate 325 milliGRAM(s) Oral daily  glucagon  Injectable 1 milliGRAM(s) IntraMuscular once  heparin   Injectable 5000 Unit(s) SubCutaneous three times a day  hydroxychloroquine 200 milliGRAM(s) Oral two times a day  levothyroxine 150 MICROGram(s) Oral daily  NIFEdipine XL 60 milliGRAM(s) Oral daily  nystatin Ointment 1 Application(s) Topical two times a day  potassium chloride    Tablet ER 20 milliEquivalent(s) Oral once    PRN MEDICATIONS  aluminum hydroxide/magnesium hydroxide/simethicone Suspension 30 milliLiter(s) Oral every 4 hours PRN  dextrose Oral Gel 15 Gram(s) Oral once PRN  HYDROmorphone   Tablet 2 milliGRAM(s) Oral every 6 hours PRN  ondansetron    Tablet 4 milliGRAM(s) Oral every 6 hours PRN  ondansetron Injectable 4 milliGRAM(s) IV Push every 8 hours PRN  oxycodone    5 mG/acetaminophen 325 mG 1 Tablet(s) Oral every 4 hours PRN      VITAL SIGNS: Last 24 Hours  T(C): 36.2 (02 Aug 2022 14:00), Max: 36.6 (01 Aug 2022 22:24)  T(F): 97.1 (02 Aug 2022 14:00), Max: 97.9 (01 Aug 2022 22:24)  HR: 100 (02 Aug 2022 14:43) (94 - 108)  BP: 113/53 (02 Aug 2022 14:00) (113/53 - 159/72)  BP(mean): 103 (02 Aug 2022 06:27) (103 - 103)  RR: 18 (02 Aug 2022 14:43) (18 - 20)  SpO2: 95% (02 Aug 2022 14:43) (95% - 97%)    LABS:                        9.5    60.32 )-----------( 392      ( 02 Aug 2022 07:06 )             28.7     08-02    136  |  95<L>  |  20  ----------------------------<  60<L>  3.8   |  21  |  1.0    Ca    8.2<L>      02 Aug 2022 07:06  Phos  1.0     08-02  Mg     2.2     08-02    TPro  5.7<L>  /  Alb  2.8<L>  /  TBili  0.7  /  DBili  x   /  AST  50<H>  /  ALT  25  /  AlkPhos  451<H>  08-01      Urinalysis Basic - ( 01 Aug 2022 07:50 )    Color: Yellow / Appearance: Slightly Turbid / S.013 / pH: x  Gluc: x / Ketone: Trace  / Bili: Small / Urobili: <2 mg/dL   Blood: x / Protein: 30 mg/dL / Nitrite: Negative   Leuk Esterase: Large / RBC: 21 /HPF /  /HPF   Sq Epi: x / Non Sq Epi: 5 /HPF / Bacteria: Few            GI PCR Panel, Stool (collected 01 Aug 2022 12:05)  Source: .Stool Feces  Final Report (02 Aug 2022 06:52):    GI PCR Results: NOT detected    *******Please Note:*******    GI panel PCR evaluates for:    Campylobacter, Plesiomonas shigelloides, Salmonella,    Vibrio, Yersinia enterocolitica, Enteroaggregative    Escherichia coli (EAEC), Enteropathogenic E.coli (EPEC),    Enterotoxigenic E. coli (ETEC) lt/st, Shiga-like    toxin-producing E. coli (STEC) stx1/stx2,    Shigella/ Enteroinvasive E. coli (EIEC), Cryptosporidium,    Cyclospora cayetanensis, Entamoeba histolytica,    Giardia lamblia, Adenovirus F 40/41, Astrovirus,    Norovirus GI/GII, Rotavirus A, Sapovirus      CARDIAC MARKERS ( 01 Aug 2022 06:38 )  x     / x     / 161 U/L / x     / x              PHYSICAL EXAM:  CONSTITUTIONAL: pt is bed bound, dont move much, No acute distress, AAOx3  HEAD: Atraumatic, normocephalic  EYES: EOM intact, PERRLA, conjunctiva and sclera clear  ENT: Supple, no masses, no thyromegaly, no bruits, no JVD; moist mucous membranes  PULMONARY: Clear to auscultation bilaterally  CARDIOVASCULAR: Regular rate and rhythm  GASTROINTESTINAL: Soft, non-tender, non-distended; bowel sounds present  MUSCULOSKELETAL: morbidly obese, difficult to detect pulses  NEUROLOGY: non-focal

## 2022-08-02 NOTE — PROGRESS NOTE ADULT - SUBJECTIVE AND OBJECTIVE BOX
Patient is a 57y old  Female who presents with a chief complaint of weakness and falling (01 Aug 2022 15:37)      Patient seen and examined at bedside.    ALLERGIES:  NSAIDs (Stomach Upset)    MEDICATIONS:  allopurinol 300 milliGRAM(s) Oral two times a day  aluminum hydroxide/magnesium hydroxide/simethicone Suspension 30 milliLiter(s) Oral every 4 hours PRN  aspirin  chewable 81 milliGRAM(s) Oral daily  dextrose 5%. 1000 milliLiter(s) IV Continuous <Continuous>  dextrose 5%. 1000 milliLiter(s) IV Continuous <Continuous>  dextrose 50% Injectable 25 Gram(s) IV Push once  dextrose 50% Injectable 12.5 Gram(s) IV Push once  dextrose 50% Injectable 25 Gram(s) IV Push once  dextrose Oral Gel 15 Gram(s) Oral once PRN  DULoxetine 60 milliGRAM(s) Oral daily  ferrous    sulfate 325 milliGRAM(s) Oral daily  glucagon  Injectable 1 milliGRAM(s) IntraMuscular once  heparin   Injectable 5000 Unit(s) SubCutaneous three times a day  HYDROmorphone   Tablet 2 milliGRAM(s) Oral every 6 hours PRN  hydroxychloroquine 200 milliGRAM(s) Oral two times a day  levothyroxine 150 MICROGram(s) Oral daily  NIFEdipine XL 60 milliGRAM(s) Oral daily  nystatin Ointment 1 Application(s) Topical two times a day  ondansetron    Tablet 4 milliGRAM(s) Oral every 6 hours PRN  ondansetron Injectable 4 milliGRAM(s) IV Push every 8 hours PRN  oxycodone    5 mG/acetaminophen 325 mG 1 Tablet(s) Oral every 4 hours PRN  potassium chloride    Tablet ER 20 milliEquivalent(s) Oral once    Vital Signs Last 24 Hrs  T(F): 97.1 (02 Aug 2022 14:00), Max: 97.9 (01 Aug 2022 22:24)  HR: 100 (02 Aug 2022 14:43) (94 - 108)  BP: 113/53 (02 Aug 2022 14:00) (113/53 - 159/72)  RR: 18 (02 Aug 2022 14:43) (18 - 20)  SpO2: 95% (02 Aug 2022 14:43) (95% - 97%)  I&O's Summary    01 Aug 2022 07:01  -  02 Aug 2022 07:00  --------------------------------------------------------  IN: 1200 mL / OUT: 0 mL / NET: 1200 mL        PHYSICAL EXAM:  General: NAD, A/O x 3  ENT: MMM  Neck: Supple, No JVD  Lungs: Clear to auscultation bilaterally  Cardio: RRR, S1/S2, No murmurs  Abdomen: Soft, Nontender, Nondistended; obese   Extremities: No cyanosis, No edema    LABS:                        9.5    60.32 )-----------( 392      ( 02 Aug 2022 07:06 )             28.7     08-02    136  |  95  |  20  ----------------------------<  60  3.8   |  21  |  1.0    Ca    8.2      02 Aug 2022 07:06  Phos  1.0     08-02  Mg     2.2     08-02    TPro  5.7  /  Alb  2.8  /  TBili  0.7  /  DBili  x   /  AST  50  /  ALT  25  /  AlkPhos  451  08-01          CARDIAC MARKERS ( 01 Aug 2022 06:38 )  x     / x     / 161 U/L / x     / x                        POCT Blood Glucose.: 95 mg/dL (02 Aug 2022 11:27)  POCT Blood Glucose.: 103 mg/dL (02 Aug 2022 07:57)  POCT Blood Glucose.: 101 mg/dL (01 Aug 2022 21:19)  POCT Blood Glucose.: 103 mg/dL (01 Aug 2022 16:50)      Urinalysis Basic - ( 01 Aug 2022 07:50 )    Color: Yellow / Appearance: Slightly Turbid / S.013 / pH: x  Gluc: x / Ketone: Trace  / Bili: Small / Urobili: <2 mg/dL   Blood: x / Protein: 30 mg/dL / Nitrite: Negative   Leuk Esterase: Large / RBC: 21 /HPF /  /HPF   Sq Epi: x / Non Sq Epi: 5 /HPF / Bacteria: Few        GI PCR Panel, Stool (collected 01 Aug 2022 12:05)  Source: .Stool Feces  Final Report (02 Aug 2022 06:52):    GI PCR Results: NOT detected    *******Please Note:*******    GI panel PCR evaluates for:    Campylobacter, Plesiomonas shigelloides, Salmonella,    Vibrio, Yersinia enterocolitica, Enteroaggregative    Escherichia coli (EAEC), Enteropathogenic E.coli (EPEC),    Enterotoxigenic E. coli (ETEC) lt/st, Shiga-like    toxin-producing E. coli (STEC) stx1/stx2,    Shigella/ Enteroinvasive E. coli (EIEC), Cryptosporidium,    Cyclospora cayetanensis, Entamoeba histolytica,    Giardia lamblia, Adenovirus F 40/41, Astrovirus,    Norovirus GI/GII, Rotavirus A, Sapovirus      COVID-19 PCR: Detected (22 @ 20:51)  COVID-19 PCR: Detected (22 @ 06:00)  COVID-19 PCR: NotDetec (22 @ 19:50)      RADIOLOGY & ADDITIONAL TESTS:    Care Discussed with Consultants/Other Providers:

## 2022-08-02 NOTE — PHYSICAL THERAPY INITIAL EVALUATION ADULT - GENERAL OBSERVATIONS, REHAB EVAL
11:00. Pt currently off unit at CT scan. Spouse present in room .Will f/u with PT.
11:20-11:54. Pt encountered semifowler in bariatric bed in NAD, +primafit, no complaints, + swelling with pitting edema noted in B LE's, Pt was agreeable to PT.

## 2022-08-02 NOTE — PROGRESS NOTE ADULT - ASSESSMENT
56 y/o F with PMHx of lupus, hypothyroidism, antiphospholipid syndrome, HTN, and anemia who was recently admitted at [facility] for diffuse pelvic pain and back pain x 3 weeks > found to have a probable Ovarian CA with apparent Met to Lung (AdenoCA on lung mass bx). Was treated for Hypercalcemia and associated N/V. Had contracted COVID (first positive PCR 7/21) > treated with 5 days of Paxlovid outpatient.   Now returns with persistent 'Generalized weakness' (like she had at the time of last discharge) that led to a fall.   Was on ground for 15 mins before EMS helped her up.     Was supposed to meet Oncologist Dr. Ferro outpatient but presented to ER before her first ever appointment.    Pt is moved to 3B today.    #Fall/Debility  Patient has had similar 'Generalized weakness' with every covid infection.   Symptoms spontaneously improve about 1-2 weeks after recovering from Covid  c/w PT here   Orthostatics-negative lying to sitting, not done at standing , CK level.    #Possible UTI  f/u with urine culture  UA positive for UTI, could be  contaminated  repeat clean catch urine or straight cath catch  c/w with antibiotics if positive    #Covid:  First positive PCR 7/21. Still positive on 7/31  (known phenomenon of Recrudescence with Paxlovid?)  Patient needs 21 days post positive due to immunosuppression to come off precautions - end date 8/11     #GOYO / Mild HAGMA: improving   #Hypokalemia, hypophosphatemia   Unclear etiology. Uric Acid 11.   May be 2/2 high tumor burden (solid tumor vs hematologic) (WBC 50k with neutrophilic predominance)  Allopurinol 300 BID. NS @ 100. US Kidney Bladder. Hold home lisinopril.  Onc eval for Rasburicase? (especially if planning chemo)  Monitor Mg, Phos, calcium  Repleted K an Phos    #WBC 50k with neutrophilic predominance:  Diarrhea  Worse from roughly 20k last admission., today 60. trending up  Leukemoid reaction vs MDS vs Hematologic malignancy  Onc contacted  Blood culture, UA, procalc to r/o acute infection (doubt)  C.diff negative, GI pcr negative (no diarrhea today)        #Alk phos elevation: RUQ US. trend    #b/l LE 1+ edema: New c/f last admission. LE duplex    # suspect adenocarcinoma of lungs with mets  from 7/14 admission w/u:   CT Chest: 15+ pulm nod up to 1.7cm -s/p IR bx of lung nod 7/21. 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  CT A/P: bilateral adnexal hypodensities, para-aortic lymphadenopathy as well as bilateral pulm nodules  TVUS: Rt 6.6cm mixed solid-cystic structure; Lt 5.6 cm heterog, solid structure w/ doppler flow   : 33 (nl); CA 19-9: 19 (nl); CEA 5.5; ; HCG < 0.6; S preg neg; ESR 80; uric acid 6.7  Inhibin A: neg; B: slightly elevated   - Heme/onc waiting for the IHC stain results    Pain control : was changed from old percocet to dilaudid last admission (d/t concurrent Paxlovid at the time).   c/w Dilaudid 2mg po q6 prn. Monitor BMs       # microcytic hypochromic anemia  prob of malign  no IV iron for now  FeSO4 325mg po q24  B12, Fol nl  hapto 262  MMA level normal  retic 2.7%; t red 0.3   - this is NOT hemolysis, even intramedullary; possible marker for lymphoma      # antiphospholipid syndrome  prob just element of lupus hx  no clots; only 1 miscarriage  On ASA  no a/c    # lupus - does not seem active currently  c/w HCQ 200mg po q12   c/w cymbalta    # HTN  held home lisinopril 40mg oral daily  increase procardia xl to 60mg 8/1    # Hypothyroidism  c/w levothyroxine 150mg oral daily    # DVT ppx: hep sq TID until goyo resolves.    # GI ppx: none    # Code: full    Handoff: GOYO treatment. Onc w/u. , HTN control, gi pcr results

## 2022-08-03 NOTE — CONSULT NOTE ADULT - SUBJECTIVE AND OBJECTIVE BOX
Patient is a 57y old  Female who presents with a chief complaint of poor ambulation (02 Aug 2022 15:42)      HPI:   56 y/o Female Pt  with a pmhx of lupus, hypothyroidism, antiphospholipid syndrome, HTN, anemia who recently dx with metastatic cancer , hypercalcemia, lymphoma , covid treated with paxlovid and morbidily obese. Presents to the ED with increased weakness and falling at home. no head injury or headaches. Patient was unable to lift herself off floor but was unable to get up. patient's   is unable to lift patient due to recent cataract surgery. In the ED, pt was noted to have an elevated WBC count of 49, increased from 28.77 from prior, GOYO noted, pt is covid positive however was recently rx with paxlovid. Pt was seen and examined at bedside, pt denies vomiting, diarrhea, abdominal pain or back pain. no sob, chest pain or weight loss. no rashes (30 Jul 2022 23:37)       ROS:  Negative except for generalized weakness.    PAST MEDICAL & SURGICAL HISTORY:  Lupus      Hypothyroidism      Iron deficiency anemia      No significant past surgical history          SOCIAL HISTORY: denies smoking. occasionally drinks alcohol denies usage of illicit drugs.     FAMILY HISTORY:  No pertinent family history in first degree relatives        MEDICATIONS  (STANDING):  allopurinol 300 milliGRAM(s) Oral two times a day  aspirin  chewable 81 milliGRAM(s) Oral daily  dextrose 5%. 1000 milliLiter(s) (50 mL/Hr) IV Continuous <Continuous>  dextrose 5%. 1000 milliLiter(s) (100 mL/Hr) IV Continuous <Continuous>  dextrose 50% Injectable 25 Gram(s) IV Push once  dextrose 50% Injectable 12.5 Gram(s) IV Push once  dextrose 50% Injectable 25 Gram(s) IV Push once  DULoxetine 60 milliGRAM(s) Oral daily  ferrous    sulfate 325 milliGRAM(s) Oral daily  glucagon  Injectable 1 milliGRAM(s) IntraMuscular once  heparin   Injectable 5000 Unit(s) SubCutaneous three times a day  hydroxychloroquine 200 milliGRAM(s) Oral two times a day  levothyroxine 150 MICROGram(s) Oral daily  NIFEdipine XL 60 milliGRAM(s) Oral daily  nystatin Powder 1 Application(s) Topical two times a day  potassium phosphate / sodium phosphate Powder (PHOS-NaK) 1 Packet(s) Oral three times a day with meals    MEDICATIONS  (PRN):  aluminum hydroxide/magnesium hydroxide/simethicone Suspension 30 milliLiter(s) Oral every 4 hours PRN Dyspepsia  dextrose Oral Gel 15 Gram(s) Oral once PRN Blood Glucose LESS THAN 70 milliGRAM(s)/deciliter  loperamide 2 milliGRAM(s) Oral every 4 hours PRN Diarrhea  ondansetron    Tablet 4 milliGRAM(s) Oral every 6 hours PRN Nausea and/or Vomiting  oxycodone    5 mG/acetaminophen 325 mG 1 Tablet(s) Oral every 4 hours PRN Moderate Pain (4 - 6)  simethicone 80 milliGRAM(s) Chew four times a day PRN Gas      Allergies    NSAIDs (Stomach Upset)    Intolerances        Vital Signs Last 24 Hrs  T(C): 36.3 (03 Aug 2022 14:44), Max: 36.3 (03 Aug 2022 14:44)  T(F): 97.4 (03 Aug 2022 14:44), Max: 97.4 (03 Aug 2022 14:44)  HR: 99 (03 Aug 2022 14:44) (99 - 110)  BP: 134/70 (03 Aug 2022 14:44) (119/59 - 134/70)  BP(mean): --  RR: 19 (03 Aug 2022 14:44) (18 - 19)  SpO2: 97% (02 Aug 2022 19:57) (97% - 97%)    Parameters below as of 02 Aug 2022 19:57  Patient On (Oxygen Delivery Method): room air        PHYSICAL EXAM  General: adult in NAD  HEENT: clear oropharynx, anicteric sclera, pink conjunctiva  Neck: supple  CV: normal S1/S2 with no murmur rubs or gallops  Lungs: positive air movement b/l ant lungs,clear to auscultation, no wheezes, no rales  Abdomen: soft non-tender non-distended, no hepatosplenomegaly  Ext: no clubbing cyanosis or edema  Skin: no rashes and no petechiae  Neuro: alert and oriented X 4, no focal deficits      LABS:                          9.1    59.83 )-----------( 398      ( 03 Aug 2022 09:28 )             27.7         Mean Cell Volume : 77.8 fL  Mean Cell Hemoglobin : 25.6 pg  Mean Cell Hemoglobin Concentration : 32.9 g/dL  Auto Neutrophil # : 48.39 K/uL  Auto Lymphocyte # : 3.99 K/uL  Auto Monocyte # : 2.90 K/uL  Auto Eosinophil # : 0.09 K/uL  Auto Basophil # : 0.06 K/uL  Auto Neutrophil % : 80.8 %  Auto Lymphocyte % : 6.7 %  Auto Monocyte % : 4.8 %  Auto Eosinophil % : 0.2 %  Auto Basophil % : 0.1 %      Serial CBC's  08-03 @ 09:28  Hct-27.7 / Hgb-9.1 / Plat-398 / RBC-3.56 / WBC-59.83  Serial CBC's  08-02 @ 07:06  Hct-28.7 / Hgb-9.5 / Plat-392 / RBC-3.72 / WBC-60.32  Serial CBC's  08-01 @ 06:38  Hct-27.9 / Hgb-9.4 / Plat-369 / RBC-3.72 / WBC-54.31  Serial CBC's  07-31 @ 18:40  Hct-29.3 / Hgb-9.9 / Plat-380 / RBC-3.88 / WBC-51.95  Serial CBC's  07-30 @ 20:37  Hct-30.3 / Hgb-9.8 / Plat-398 / RBC-3.90 / WBC-49.00      08-03    136  |  97<L>  |  23<H>  ----------------------------<  92  3.7   |  22  |  1.4    Ca    8.0<L>      03 Aug 2022 09:28  Phos  1.1     08-03  Mg     2.2     08-03            Ferritin, Serum: 714 ng/mL (08-01 @ 06:38)    RADIOLOGY & ADDITIONAL STUDIES  MPRESSION:    Limited examination as described above.    Confluent hypodense lesions are visualized possibly involving the   bilateral adnexa (601/70-50), measuring up to 6.8 x 4.5 cm. Recommend   follow-up pelvic ultrasound.    Diffuse rounded para-aortic lymphadenopathy, measuring upto 1.7 cm in   short axis, compatible with neoplastic process versus less likely   infectious/inflammatory process. Recommend PET/CT and/or tissue sampling.    Multiple scattered bilateral solid pulmonary nodules, the largest of   which measures 8 mmin the right middle lobe.    Overall findings are suspicious for underlying metastatic ovarian   malignancy/lymphoma.    --- End of Report ---      Cytopathology - Non Gyn Report:   ACCESSION No: [id number]   Patient: KELSIE JUAREZ   Accession: [id number]   Collected Date/Time: 7/21/2022 17:11 EDT   Received Date/Time: 7/21/2022 17:38 EDT   Fine Needle Aspiration Addendum Report - Auth (Verified)   Addendum   Immunohistochemical stains are performed and show the tumor is positive   for CK7, PAX-8, GATA3, focally positive for CDX-2, negative for CK20,   p40, TTF-1/napsin;  is notified on 8/1/2022. Additional IHC pending.   NGS and PD-L1 staining are pending.   Verified by: Eva Restrepo M.D.   (Electronic Signature)   Reported on: 08/01/22 14:30 EDT, [facility],   [address] [place] [address]   Phone: [phone] Fax: [phone]   _________________________________________________________________   Fine Needle Aspiration Report - Auth (Verified)   Specimen(s) Submitted   Lung nodule, left upper lobe, CT-guided core bx   Final Diagnosis   LUNG NODULE, LEFT UPPER LOBE, CT-GUIDED BIOPSY AND IMPRINTS:   - POSITIVE FOR MALIGNANT CELLS.   - Adenocarcinoma.   - Immunohistochemical stains pending.   Screened by: Eva Restrepo M.D.   Verified by: Eva Restrepo M.D.   (Electronic Signature)   Reported on:07/22/22 11:50 EDT, [facility],   [address] [address]   Phone: [phone] Fax: [phone]   _________________________________________________________________   Statement of Adequacy   On-site adequacy assessment performed by Dr. Restrepo:   - Lesional tissue obtained; satisfactory for interpretation.   - Dr. Alston is notified.   The test was performed on 7/21/2022 and at [facility], [address] [place] [address].   Clinical Information   Left lung nodule, hypercalcemia   Gross Description   Received are 2 DQ stained smears and a formalin container with multiple   tissue fragments measuring 1.4 x 0.2 cm in length with a diameter   of < 0.1 cm. Tissue fragments are submitted entirely in 2 cassettes.   All slides and containers are labeled with patient 's name and date of   birth. Grossing byRADHA Barton, 7/21/2022 @ 4:00pm. (07.21.22 @ 17:11)          Patient is a 57y old  Female who presents with a chief complaint of poor ambulation (02 Aug 2022 15:42)      HPI:   56 y/o female patient  with a pmhx of lupus, hypothyroidism, antiphospholipid syndrome, HTN, anemia who recently dx with metastatic cancer , hypercalcemia, lymphoma , covid treated with Paxlovid and morbidily obese. Presents to the ED with increased weakness and falling at home. No head injury or headaches. Patient was able to lift herself off the floor but was unable to get up. Patient's   was unable to lift patient due to recent cataract surgery. In the ED, pt was noted to have an elevated WBC count of 49, increased from 28.77 from prior, GOYO noted. Pt is Covid positive however was recently treated with Paxlovid.   Pt was seen and examined at bedside. She was denying vomiting, diarrhea, abdominal pain or back pain. No SOB, chest pain or weight loss. No rashes (30 Jul 2022 23:37)       ROS:  Negative except for generalized weakness.    PAST MEDICAL & SURGICAL HISTORY:  Lupus    Hypothyroidism    Iron deficiency anemia    No significant past surgical history    SOCIAL HISTORY: denies smoking. Occasionally drinks alcohol, denies usage of illicit drugs.     FAMILY HISTORY:  No pertinent family history in first degree relatives        MEDICATIONS  (STANDING):  allopurinol 300 milliGRAM(s) Oral two times a day  aspirin  chewable 81 milliGRAM(s) Oral daily  dextrose 5%. 1000 milliLiter(s) (50 mL/Hr) IV Continuous <Continuous>  dextrose 5%. 1000 milliLiter(s) (100 mL/Hr) IV Continuous <Continuous>  dextrose 50% Injectable 25 Gram(s) IV Push once  dextrose 50% Injectable 12.5 Gram(s) IV Push once  dextrose 50% Injectable 25 Gram(s) IV Push once  DULoxetine 60 milliGRAM(s) Oral daily  ferrous    sulfate 325 milliGRAM(s) Oral daily  glucagon  Injectable 1 milliGRAM(s) IntraMuscular once  heparin   Injectable 5000 Unit(s) SubCutaneous three times a day  hydroxychloroquine 200 milliGRAM(s) Oral two times a day  levothyroxine 150 MICROGram(s) Oral daily  NIFEdipine XL 60 milliGRAM(s) Oral daily  nystatin Powder 1 Application(s) Topical two times a day  potassium phosphate / sodium phosphate Powder (PHOS-NaK) 1 Packet(s) Oral three times a day with meals    MEDICATIONS  (PRN):  aluminum hydroxide/magnesium hydroxide/simethicone Suspension 30 milliLiter(s) Oral every 4 hours PRN Dyspepsia  dextrose Oral Gel 15 Gram(s) Oral once PRN Blood Glucose LESS THAN 70 milliGRAM(s)/deciliter  loperamide 2 milliGRAM(s) Oral every 4 hours PRN Diarrhea  ondansetron    Tablet 4 milliGRAM(s) Oral every 6 hours PRN Nausea and/or Vomiting  oxycodone    5 mG/acetaminophen 325 mG 1 Tablet(s) Oral every 4 hours PRN Moderate Pain (4 - 6)  simethicone 80 milliGRAM(s) Chew four times a day PRN Gas      Allergies    NSAIDs (Stomach Upset)    Intolerances        Vital Signs Last 24 Hrs  T(C): 36.3 (03 Aug 2022 14:44), Max: 36.3 (03 Aug 2022 14:44)  T(F): 97.4 (03 Aug 2022 14:44), Max: 97.4 (03 Aug 2022 14:44)  HR: 99 (03 Aug 2022 14:44) (99 - 110)  BP: 134/70 (03 Aug 2022 14:44) (119/59 - 134/70)  BP(mean): --  RR: 19 (03 Aug 2022 14:44) (18 - 19)  SpO2: 97% (02 Aug 2022 19:57) (97% - 97%)    Parameters below as of 02 Aug 2022 19:57  Patient On (Oxygen Delivery Method): room air        PHYSICAL EXAM  General: adult in NAD. Obese.  HEENT: clear oropharynx, anicteric sclera, pink conjunctiva  Neck: supple  CV: normal S1/S2 with no murmur rubs or gallops  Lungs: positive air movement b/l ant lungs,clear to auscultation, no wheezes, no rales  Abdomen: soft non-tender non-distended, no hepatosplenomegaly, However, examination limited due to the body habitus.  Ext: no clubbing cyanosis or edema  Skin: no rashes and no petechiae  Neuro: alert and oriented X 4, no focal deficits      LABS:                          9.1    59.83 )-----------( 398      ( 03 Aug 2022 09:28 )             27.7         Mean Cell Volume : 77.8 fL  Mean Cell Hemoglobin : 25.6 pg  Mean Cell Hemoglobin Concentration : 32.9 g/dL  Auto Neutrophil # : 48.39 K/uL  Auto Lymphocyte # : 3.99 K/uL  Auto Monocyte # : 2.90 K/uL  Auto Eosinophil # : 0.09 K/uL  Auto Basophil # : 0.06 K/uL  Auto Neutrophil % : 80.8 %  Auto Lymphocyte % : 6.7 %  Auto Monocyte % : 4.8 %  Auto Eosinophil % : 0.2 %  Auto Basophil % : 0.1 %      Serial CBC's  08-03 @ 09:28  Hct-27.7 / Hgb-9.1 / Plat-398 / RBC-3.56 / WBC-59.83  Serial CBC's  08-02 @ 07:06  Hct-28.7 / Hgb-9.5 / Plat-392 / RBC-3.72 / WBC-60.32  Serial CBC's  08-01 @ 06:38  Hct-27.9 / Hgb-9.4 / Plat-369 / RBC-3.72 / WBC-54.31  Serial CBC's  07-31 @ 18:40  Hct-29.3 / Hgb-9.9 / Plat-380 / RBC-3.88 / WBC-51.95  Serial CBC's  07-30 @ 20:37  Hct-30.3 / Hgb-9.8 / Plat-398 / RBC-3.90 / WBC-49.00      08-03    136  |  97<L>  |  23<H>  ----------------------------<  92  3.7   |  22  |  1.4    Ca    8.0<L>      03 Aug 2022 09:28  Phos  1.1     08-03  Mg     2.2     08-03            Ferritin, Serum: 714 ng/mL (08-01 @ 06:38)    RADIOLOGY & ADDITIONAL STUDIES  IMPRESSION:    Limited examination as described above.    Confluent hypodense lesions are visualized possibly involving the   bilateral adnexa (601/70-50), measuring up to 6.8 x 4.5 cm. Recommend   follow-up pelvic ultrasound.    Diffuse rounded para-aortic lymphadenopathy, measuring up to 1.7 cm in   short axis, compatible with neoplastic process versus less likely   infectious/inflammatory process. Recommend PET/CT and/or tissue sampling.    Multiple scattered bilateral solid pulmonary nodules, the largest of   which measures 8 mm in the right middle lobe.    Overall findings are suspicious for underlying metastatic ovarian   malignancy/lymphoma.    --- End of Report ---      Cytopathology - Non Gyn Report:   ACCESSION No: [id number]   Patient: KELSIE JUAREZ   Accession: [id number]   Collected Date/Time: 7/21/2022 17:11 EDT   Received Date/Time: 7/21/2022 17:38 EDT   Fine Needle Aspiration Addendum Report - Auth (Verified)   Addendum   Immunohistochemical stains are performed and show the tumor is positive   for CK7, PAX-8, GATA3, focally positive for CDX-2, negative for CK20,   p40, TTF-1/napsin;  is notified on 8/1/2022. Additional IHC pending.   NGS and PD-L1 staining are pending.   Verified by: Eva Restrepo M.D.   (Electronic Signature)   Reported on: 08/01/22 14:30 EDT, [facility],   [address] [place] [address]   Phone: [phone] Fax: [phone]   _________________________________________________________________   Fine Needle Aspiration Report - Auth (Verified)   Specimen(s) Submitted   Lung nodule, left upper lobe, CT-guided core bx   Final Diagnosis   LUNG NODULE, LEFT UPPER LOBE, CT-GUIDED BIOPSY AND IMPRINTS:   - POSITIVE FOR MALIGNANT CELLS.   - Adenocarcinoma.   - Immunohistochemical stains pending.   Screened by: Eva Restrepo M.D.   Verified by: Eva Restrepo M.D.   (Electronic Signature)   Reported on:07/22/22 11:50 EDT, [facility],   [place] [address]   Phone: [phone] Fax: [phone]   _________________________________________________________________   Statement of Adequacy   On-site adequacy assessment performed by Dr. Restrepo:   - Lesional tissue obtained; satisfactory for interpretation.   - Dr. Alston is notified.   The test was performed on 7/21/2022 and at [facility], [address] [place] [address].   Clinical Information   Left lung nodule, hypercalcemia   Gross Description   Received are 2 DQ stained smears and a formalin container with multiple   tissue fragments measuring 1.4 x 0.2 cm in length with a diameter   of < 0.1 cm. Tissue fragments are submitted entirely in 2 cassettes.   All slides and containers are labeled with patient 's name and date of   birth. Grossing byRADHA Barton, 7/21/2022 @ 4:00pm. (07.21.22 @ 17:11)

## 2022-08-03 NOTE — PROGRESS NOTE ADULT - ASSESSMENT
Pt is a 58 y/o F with PMHx of lupus, hypothyroidism, antiphospholipid syndrome, HTN, and anemia who presented to the ER complaining of diffuse pelvic pain and back pain x 3 weeks associated with decreased appetite and vomiting over the last 8 days.    # COVID + on last admit  was exposed 7/16 to another pt in hosp w/ COVID;  also had COVID  1st COVID + 7/21 - 21 days of isolation - to 8/11  pt s/p Paxlovid as outpt  seems a little symptomatic (sinuses) now  NOT on O2 - no steroids  this is NOT why WBC high    # diarrhea - not sure why  C Diff neg; GI pcr neg  c/w imodium prn    # hypophosphatemia  prob effect of PTHRP and malnutrition w/ refeeding syndrome  neutraphos 1 pkt po 3x/day w/ meals  rpt phos q24 til >2    # hypercalcemia - seems better now  iPTH < 20 -> hyperpara RULED OUT  likely malign in origin - PTHrP 11 (nl <2) and + adenoCa on bx  zofran 4mg po q6 prn n/v   s/p Pamidronate 90mg iv x1 last admit  IVFs: none  25 OH Vit D 74 (upper limit of nl)    # confirmed malignancy (adenoCa): b/l adnexal lesions; paraaortic LN; 15+ pulm nodules;   CT C: 15+ pulm nod up to 1.7cm - IR s/p bx of lung nod 7/21  path: + adenoCa, IHC still incomplete  CT A/P: bilateral adnexal hypodensities, para-aortic lymphadenopathy as well as bilateral pulm nodules  TVUS: Rt 6.6cm mixed solid-cystic structure; Lt 5.6 cm heterog, solid structure w/ doppler flow   H/O f/u  Gyn/Onc f/u  : 33 (nl); CA 19-9: 19 (nl); CEA 5.5; ; HCG < 0.6; S preg neg; ESR 80;  uric acid 6.7 -> 11.5 (prob cell turnover) - on allopurinol 300mg po q12 (f/u h/o)  Inhibin A: neg; B: slightly elevated   percocet 5/325mg po q4 prn neopl-related pain     # marked leukocytosis and tachycardia = SIRS 2/2 malign  prob reactive   bld cx all neg   BCR/ABL neg; JAK2 neg; flow cyto neg; CALR neg; MPL mut analysis neg   consider BM Bx? - f/u h/o  cbc NOT need daily    # microcytic anemia  prob of malign  iron sat 10%, TIBC 314, Ferr 85 = could be iron def  no IV iron for now given SIRS  FeSO4 325mg po q24  B12, Fol nl  hapto 262   (upper nl)  retic 2.7%; t red 0.3   - this is NOT hemolysis, even intramedullary; I suspect this is a marker for malign or cell turnover    # minor GOYO - prob dehydration  Renal U/S: no hydro  oral hydration  BMP q48    # prior Thrombocytosis - now better    # abnl LFTs mild (incr AP and slight incr AST)  prob related to underlying malign  RUQ U/S: GB stones w/out cholecystitis  no further w/u for now  no need to follow LFTs    # antiphospholipid syndrome  prob just element of lupus hx  no clots; only 1 miscarriage  cont asa   no a/c    # lupus - does not seem active currently  c/w HCQ 200mg po q12  c/w cymbalta    # HTN  off lisinopril (40mg oral daily) - for GOYO  c/w procardia xl 60 qd     # Hypothyroidism  c/w levothyroxine 150mg oral daily  TSH 8.61 - would NOT raise dose given tachycardia  check Free T4 and Total T3    # no hx of DM  d/c all FS - they are always nl    # DVT ppx: hep 5000 sc q8    # GI ppx: none    # Activity: used to walk and cannot now; s/p fall at home; PT eval    # Code: full    Dispo: f/u tissue bx; f/u H/O and G/O; d/c FS; f/u labs above; tx phos; tx pain and n/v; tx diarrhea; chech FT4/TT3  eventually, pt will need STR, prob 4A or SNF (doubt pt will fxn well at home) - not ready for d/c yet    Prog is very guarded.

## 2022-08-03 NOTE — CONSULT NOTE ADULT - ATTENDING COMMENTS
The patient was also seen and examined by myself. I agree with the medical resident's note above. Situation discussed with her and the patient.  All questions answered. The patient was also seen and examined by myself. I agree with the Hem-Onc fellow's note above. Situation discussed with her and the patient.  All questions answered.

## 2022-08-03 NOTE — PROGRESS NOTE ADULT - SUBJECTIVE AND OBJECTIVE BOX
KELSIE JUAREZ 57y Female  MRN#: [id number]   Hospital Day: 4d    SUBJECTIVE  Patient is a 57y old Female who presents with a chief complaint of poor ambulation (02 Aug 2022 15:42)  Currently admitted to medicine with the primary diagnosis of GOYO (acute kidney injury)      INTERVAL HPI AND OVERNIGHT EVENTS:  Patient was examined and seen at bedside. This morning she is resting comfortably in bed, in good spirits, endorses continuous diarrhea overnight. No other events, vitals stable.     OBJECTIVE  PAST MEDICAL & SURGICAL HISTORY  Lupus    Hypothyroidism    Iron deficiency anemia    No significant past surgical history      ALLERGIES:  NSAIDs (Stomach Upset)    MEDICATIONS:  STANDING MEDICATIONS  allopurinol 300 milliGRAM(s) Oral two times a day  aspirin  chewable 81 milliGRAM(s) Oral daily  dextrose 5%. 1000 milliLiter(s) IV Continuous <Continuous>  dextrose 5%. 1000 milliLiter(s) IV Continuous <Continuous>  dextrose 50% Injectable 25 Gram(s) IV Push once  dextrose 50% Injectable 12.5 Gram(s) IV Push once  dextrose 50% Injectable 25 Gram(s) IV Push once  DULoxetine 60 milliGRAM(s) Oral daily  ferrous    sulfate 325 milliGRAM(s) Oral daily  glucagon  Injectable 1 milliGRAM(s) IntraMuscular once  heparin   Injectable 5000 Unit(s) SubCutaneous three times a day  hydroxychloroquine 200 milliGRAM(s) Oral two times a day  levothyroxine 150 MICROGram(s) Oral daily  loperamide 2 milliGRAM(s) Oral every 6 hours  NIFEdipine XL 60 milliGRAM(s) Oral daily  nystatin Powder 1 Application(s) Topical two times a day    PRN MEDICATIONS  aluminum hydroxide/magnesium hydroxide/simethicone Suspension 30 milliLiter(s) Oral every 4 hours PRN  dextrose Oral Gel 15 Gram(s) Oral once PRN  HYDROmorphone   Tablet 2 milliGRAM(s) Oral every 6 hours PRN  ondansetron    Tablet 4 milliGRAM(s) Oral every 6 hours PRN  ondansetron Injectable 4 milliGRAM(s) IV Push every 8 hours PRN  oxycodone    5 mG/acetaminophen 325 mG 1 Tablet(s) Oral every 4 hours PRN  simethicone 80 milliGRAM(s) Chew four times a day PRN      VITAL SIGNS: Last 24 Hours  T(C): 35.7 (03 Aug 2022 06:51), Max: 36.2 (02 Aug 2022 14:00)  T(F): 96.2 (03 Aug 2022 06:51), Max: 97.1 (02 Aug 2022 14:00)  HR: 110 (03 Aug 2022 06:51) (100 - 110)  BP: 119/59 (03 Aug 2022 06:51) (113/53 - 123/69)  BP(mean): --  RR: 18 (03 Aug 2022 06:51) (18 - 18)  SpO2: 97% (02 Aug 2022 19:57) (95% - 97%)    LABS:                        9.1    59.83 )-----------( 398      ( 03 Aug 2022 09:28 )             27.7     08-03    136  |  97<L>  |  23<H>  ----------------------------<  92  3.7   |  22  |  1.4    Ca    8.0<L>      03 Aug 2022 09:28  Phos  1.1     08-03  Mg     2.2     08-03                GI PCR Panel, Stool (collected 01 Aug 2022 12:05)  Source: .Stool Feces  Final Report (02 Aug 2022 06:52):    GI PCR Results: NOT detected    *******Please Note:*******    GI panel PCR evaluates for:    Campylobacter, Plesiomonas shigelloides, Salmonella,    Vibrio, Yersinia enterocolitica, Enteroaggregative    Escherichia coli (EAEC), Enteropathogenic E.coli (EPEC),    Enterotoxigenic E. coli (ETEC) lt/st, Shiga-like    toxin-producing E. coli (STEC) stx1/stx2,    Shigella/ Enteroinvasive E. coli (EIEC), Cryptosporidium,    Cyclospora cayetanensis, Entamoeba histolytica,    Giardia lamblia, Adenovirus F 40/41, Astrovirus,    Norovirus GI/GII, Rotavirus A, Sapovirus    Culture - Blood (collected 01 Aug 2022 06:38)  Source: .Blood None  Preliminary Report (02 Aug 2022 18:02):    No growth to date.          RADIOLOGY:  RU Q US 8/1/22: Cholelithiasis without ductal dilation   US KB 8/1/22: Normal renal ultrasound, pelvic mass w/ compression on urinary bladder  Venous LE duplex 8/1: No evidence of deep venous thrombosis in either lower extremity. Bilateral peroneal veins are not visualized  CT chest w/ IV con 7/15: Right pelvic 6.6 cm heterogeneous mixed solid-cystic structure; may   represent ovarian mass. Lower pelvic 5.6 cm heterogeneous, solid appearing structure without   appreciable Doppler flow; may represent additional ovarian mass or degenerating fibroid.      PHYSICAL EXAM:  CONSTITUTIONAL: No acute distress, obese, well-groomed, AAOx3  PULMONARY: Clear to auscultation bilaterally; no wheezes, rales, or rhonchi  CARDIOVASCULAR: Regular rate and rhythm; no murmurs, rubs, or gallops  GASTROINTESTINAL: Soft, non-tender, non-distended; bowel sounds present  MUSCULOSKELETAL: distal pulses not palpable; diffuse LE b/l edema, no clubbing, no cyanosis  SKIN: No rashes or lesions; facial flushing, warm and dry

## 2022-08-03 NOTE — PROGRESS NOTE ADULT - ASSESSMENT
ASSESSMENT & PLAN  # 58 y/o F with PMHx of lupus, hypothyroidism, antiphospholipid syndrome, HTN, and anemia who was recently admitted at [facility] for diffuse pelvic pain and back pain x 3 weeks > found to have a probable Ovarian CA with  mets to Lung (AdenoCA on lung mass bx). Treated for Hypercalcemia and associated N/V. Reccent COVID (first positive PCR 7/21) > treated with 5 days of Paxlovid outpatient.   Returned with persistent 'Generalized weakness' that led to a fall on ground for 15 mins before EMS helped her up.   Was supposed to meet Oncologist Dr. Ferro outpatient but presented to ER before her first ever appointment.            #Misc  - DVT Prophylaxis:  - Diet:  - GI Prophylaxis:  - Activity:  - IV Fluids:  - Code Status:    Dispo: ASSESSMENT & PLAN  # 58 y/o F with PMHx of lupus, hypothyroidism, antiphospholipid syndrome, HTN, and anemia who was recently admitted at [facility] for diffuse pelvic pain and back pain x 3 weeks > found to have a probable Ovarian CA with  mets to Lung (AdenoCA on lung mass bx). Treated for Hypercalcemia and associated N/V. Reccent COVID (first positive PCR 7/21) > treated with 5 days of Paxlovid outpatient. Was supposed to meet Oncologist Dr. Ferro outpatient but presented to ER with persistent 'Generalized weakness' that led to a fall on ground for 15 mins before EMS helped her up.     #Generalized weakness  #Fall  - h/o general weakness from previous covid infections, reports sxs improve about 1-2weeks after recovery  - bed bound pt states too weak to remain standing  - c/w PT possible d/c to STR  - orthostatics -ve  - , -ve    # suspect malignancy: b/l adnexal lesions; paraaortic LN; 15+ pulm nodules; NO B symptoms from 7/14 admission w/u:   #leukocytosis with neutrophilic predominance  - CT Chest: 15+ pulm nod up to 1.7cm -s/p IR bx of lung nod 7/21. 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 Monitor BMs   - WBC 60k, previous admission 20k    - hx of diarrhea gi pcr and c.diff negative  - leukemoid rxn vs MDS vs Hematologic malignancy  - BCx 8/21 -ve UA -ve no active infection source  - med onc and gyn onc consulted     #GOYO/HAGMA  #Hypokalemia, hypophosphatemia   - unclear etiology  - Uric acid 11  - possibly 2/2 tumor burden (WBC 50k w/ NT predominance)  - allopurinol 300 BID  - NS @ 100cc  - holding lisinopril   - Onc eval 8/4 for recs  - Mg 2.2  - Phos 1.1   - Ca 8.0  - c/w electrolyte correction     #Diarrhea  - gi pcr and c. diff negative  - started on loperamide and simethicone     #Covid  - PCR positive 7/21 s/p paxlovid  - needs 21 days post positive test due to immunosuppression - ends 8/11    #Alk phos elevation   - RUQ US - cholelithiasis with ductal dilation   - trending 451> 457    # microcytic hypochromic anemia  - Last admission:   iron sat 10%, TIBC 314, Ferr 85 = could be iron def  - no IV iron for now  - FeSO4 325mg po q24  - B12, Fol nl  - hapto 262  - MMA level normal  - retic 2.7%; t red 0.3  -  - this is NOT hemolysis, even intramedullary; possible marker for lymphoma    # antiphospholipid syndrome  - no hx of clots; only 1 miscarriage  - on aspirin  - no a/c    # lupus - not in active flare  - c/w HCQ 200mg po q12   - c/w cymbalta    # HTN  - held home lisinopril 40mg oral daily  - increase procardia xl to 60mg 8/1    # Hypothyroidism  - c/w levothyroxine 150mg oral daily    #Misc  - DVT Prophylaxis: hep SQ TID   - Diet: DASH/TLC  - GI Prophylaxis: none  - Activity: bed bound  - IV Fluids: NS 100cc  - Code Status: FULL    Dispo: [facility]

## 2022-08-03 NOTE — PROGRESS NOTE ADULT - SUBJECTIVE AND OBJECTIVE BOX
KELSIE JUAREZ  57y  Female  ***My note supersedes ALL resident notes that I sign.  My corrections for their notes are in my note.***    I can be reached directly on [id number]. My office number is [phone]. My personal cell number is [phone].    INTERVAL EVENTS: Here for f/u of cancer. Pt c/o diarrhea x2-3 /day and abd pain (usual). Pt cannot walk now - bedbound mostly.    T(F): 96.2 (08-03-22 @ 06:51), Max: 96.6 (08-02-22 @ 21:19)  HR: 110 (08-03-22 @ 06:51) (100 - 110)  BP: 119/59 (08-03-22 @ 06:51) (119/59 - 123/69)  RR: 18 (08-03-22 @ 06:51) (18 - 18)  SpO2: 97% (08-02-22 @ 19:57) (95% - 97%)    Gen: NAD  HEENT: PERRL, EOMI, mouth clr, nose clr  Neck: no nodes, no JVD, thyroid nl  lungs: clr  hrt: s1 s2 reg, tachy, no murmur  abd: soft, obese, ND, mildly tender lower abd; no HS megaly; hard to eval for mass given body habitus  ext: tr edema, no c/c  neuro: aa ox3, cn intact, can move all 4 ext, but legs very weak    LABS:                      9.1     (    77.8   59.83 )-----------( ---------      398      ( 03 Aug 2022 09:28 )             27.7    (    22.4     WBC Count: 59.83 K/uL (08-03-22 @ 09:28)  WBC Count: 60.32 K/uL (08-02-22 @ 07:06)  WBC Count: 54.31 K/uL (08-01-22 @ 06:38)  WBC Count: 51.95 K/uL (07-31-22 @ 18:40)  WBC Count: 49.00 K/uL (07-30-22 @ 20:37)    Hemoglobin: 9.1 g/dL (08-03 @ 09:28)  Hemoglobin: 9.5 g/dL (08-02 @ 07:06)  Hemoglobin: 9.4 g/dL (08-01 @ 06:38)  Hemoglobin: 9.9 g/dL (07-31 @ 18:40)  Hemoglobin: 9.8 g/dL (07-30 @ 20:37)    136   (   97   (   92      08-03-22 @ 09:28  ----------------------               3.7   (   22   (   23                             -----                        1.4  Ca  8.0   Mg  2.2    P   1.1     Creatinine:   1.4 (08-03 @ 09:28)  eGFR:  44    Creatinine:   1.2 (08-03 @ 00:11)  eGFR:  53    Creatinine:   1.0 (08-02 @ 07:06)  eGFR:  66    Creatinine:   1.1 (08-01 @ 06:38)  eGFR:  59    Creatinine:   1.3 (08-01 @ 01:28)  eGFR:  48    Creatinine:   1.4 (07-31 @ 18:40)  eGFR:  44    Creatinine:   1.8 (07-30 @ 20:37)  eGFR:  32      Alb 2.8  T red 0.7     AST 50  ALT 25  08-01-22 @ 06:38  Alb 3.0  T red 0.5     AST 58  ALT 26  07-31-22 @ 18:40  Alb 3.4  T red 0.5     AST 73  ALT 27  07-30-22 @ 20:37    CAPILLARY BLOOD GLUCOSE  POCT Blood Glucose.: 102 (08-03-22 @ 11:50)  POCT Blood Glucose.: 104 (08-03-22 @ 08:19)  POCT Blood Glucose.: 108 (08-02-22 @ 21:38)  POCT Blood Glucose.: 122 (08-02-22 @ 17:09)  POCT Blood Glucose.: 95 (08-02-22 @ 11:27)  POCT Blood Glucose.: 103 (08-02-22 @ 07:57)  POCT Blood Glucose.: 101 (08-01-22 @ 21:19)  POCT Blood Glucose.: 103 (08-01-22 @ 16:50)    Culture - Blood (collected 08-01-22 @ 06:38)  Source: .Blood None  Preliminary Report (08-02-22 @ 18:02):    No growth to date.    RADIOLOGY & ADDITIONAL TESTS:  < from: US Abdomen Upper Quadrant Right (08.01.22 @ 12:33) >  IMPRESSION:  Cholelithiasis without ductal dilatation.    < end of copied text >    < from: US Kidney and Bladder (08.01.22 @ 12:32) >  IMPRESSION:    Normal renal ultrasound.    Pelvic mass with compression on the urinary bladder.    < end of copied text >    < from: VA Duplex Lower Ext Vein Scan, Bilat (08.01.22 @ 11:14) >  IMPRESSION:  No evidence of deep venous thrombosis in either lower extremity.    Bilateral peroneal veins are not visualized    < end of copied text >    < from: Xray Chest 1 View-PORTABLE IMMEDIATE (Xray Chest 1 View-PORTABLE IMMEDIATE .) (07.30.22 @ 20:34) >  Impression:    Left lung nodules, otherwise no radiographic evidence of acute cardiopulmonary disease.    < end of copied text >    MEDICATIONS:  hydroxychloroquine 200 milliGRAM(s) Oral two times a day    allopurinol 300 milliGRAM(s) Oral two times a day  aluminum hydroxide/magnesium hydroxide/simethicone Suspension 30 milliLiter(s) Oral every 4 hours PRN  aspirin  chewable 81 milliGRAM(s) Oral daily  DULoxetine 60 milliGRAM(s) Oral daily  ferrous    sulfate 325 milliGRAM(s) Oral daily  heparin   Injectable 5000 Unit(s) SubCutaneous three times a day  levothyroxine 150 MICROGram(s) Oral daily  loperamide 2 milliGRAM(s) Oral every 4 hours PRN  NIFEdipine XL 60 milliGRAM(s) Oral daily  nystatin Powder 1 Application(s) Topical two times a day  ondansetron    Tablet 4 milliGRAM(s) Oral every 6 hours PRN  oxycodone    5 mG/acetaminophen 325 mG 1 Tablet(s) Oral every 4 hours PRN  potassium phosphate / sodium phosphate Powder (PHOS-NaK) 1 Packet(s) Oral three times a day with meals  simethicone 80 milliGRAM(s) Chew four times a day PRN

## 2022-08-03 NOTE — CONSULT NOTE ADULT - ASSESSMENT
Patient is a 56 y/o F with a pmhx of lupus, hypothyroidism, antiphospholipid syndrome, HTN, anemia  recently diagnosed with metastatic disease with  b/l adnexal masses and lung nodules unknown primary disease(7/2022) presented with weakness and admitted.    Oncology was consulted regarding management of metastatic disease with ovarian masses and lung nodules.    #Metastatic disease unknown primary site of origin.  #Bilateral adnexal masses with para-aortic lymphadenopathy and pulmonary nodules.  # Biopsy of Left upper lobe Lung nodule- Adenocarcinoma. IHC, PDL-1 and NGS pending.  -ca 19-9, ca125 normal  -CEA 5.5  -US Transvaginal : right 6.6 cm heterogenous mixed solid-cystic mass and lower pelvic 5.6 cm heterogenous solid appearing may represent ovarian mass or degenerating fibroid.  -CT A/P W contrast 7/2022: B/l confluent hypodensities in b/l adnexa measuring 6.8 cm x 4.5 cm, paraaortic lymphadenopathy and b/l solid pulmonary nodules largest measuring 8mm.  -Pathology of Left upper lobe lung nodule : adenocarcinoma. IHC stains pending, PDL1 and NGS pending.  -Most likely primary is ovarian, However Breast could also be a possibility.    Microcytic anemia:  - received 5 doses of Venofer for Iron deficiency anemia in 7/2022 during prior admission.      Recommendations.  -Please obtain diagnostic mammogram of B/L breasts.  - Please obtain MRI brain with and without contrast as pt presented with weakness and  in the setting of metastatic disease need to r/o brain metastasis.   - will follow up with  pathology report.  - follow up GYN Oncology.  Patient is a 56 y/o F with a pmhx of lupus, hypothyroidism, antiphospholipid syndrome, HTN, anemia  recently diagnosed  with  b/l adnexal masses and lung nodules unknown primary disease(7/2022) presented with weakness and falls and admitted.    Oncology was consulted regarding management of ovarian masses and lung nodules.    # Possible Metastatic disease unknown primary site of origin.  #Bilateral adnexal masses with para-aortic lymphadenopathy and pulmonary nodules.  # Biopsy of Left upper lobe Lung nodule- Adenocarcinoma. IHC, PDL-1 and NGS pending.  -ca 19-9, ca125 normal  -CEA 5.5  -US Transvaginal : right 6.6 cm heterogenous mixed solid-cystic mass and lower pelvic 5.6 cm heterogenous solid appearing may represent ovarian mass or degenerating fibroid.  -CT A/P W contrast 7/2022: B/l confluent hypodensities in b/l adnexa measuring 6.8 cm x 4.5 cm, paraaortic lymphadenopathy and b/l solid pulmonary nodules largest measuring 8mm.  -Pathology of Left upper lobe lung nodule : adenocarcinoma. IHC stains pending, PDL1 and NGS pending.  -Most likely primary is ovarian, However Breast could also be a possibility.    Microcytic anemia:  - received 5 doses of Venofer for Iron deficiency anemia in 7/2022 during prior admission.      Recommendations.  -Please obtain diagnostic mammogram of B/L breasts.  - Please obtain MRI brain with and without contrast as pt presented with weakness and  in the setting of metastatic disease need to r/o brain metastasis.   - will follow up with  pathology report.  - follow up GYN Oncology.  Patient is a 56 y/o F with a pmhx of lupus, hypothyroidism, antiphospholipid syndrome, HTN, anemia  recently diagnosed  with  b/l adnexal masses and lung nodules unknown primary disease(7/2022) presented with weakness and falls and admitted.    Oncology was consulted regarding management of ovarian masses and lung nodules.     #Bilateral adnexal masses with para-aortic lymphadenopathy and pulmonary nodules suggestive of metastatic disease , unknown primary site of origin.  # Biopsy of Left upper lobe Lung nodule- Adenocarcinoma. IHC, PDL-1 and NGS pending.  -ca 19-9, ca125 normal  -CEA 5.5  -US Transvaginal : right 6.6 cm heterogenous mixed solid-cystic mass and lower pelvic 5.6 cm heterogenous solid appearing may represent ovarian mass or degenerating fibroid.  -CT A/P W contrast 7/2022: B/l confluent hypodensities in b/l adnexa measuring 6.8 cm x 4.5 cm, paraaortic lymphadenopathy and b/l solid pulmonary nodules largest measuring 8mm.  -Pathology of Left upper lobe lung nodule : adenocarcinoma. IHC stains pending, PDL1 and NGS pending.  -Most likely primary is ovarian, However Breast could also be a possibility.    Microcytic anemia:  - received 5 doses of Venofer for Iron deficiency anemia in 7/2022 during prior admission.      Recommendations.  -Please obtain diagnostic mammogram of B/L breasts.  - Please obtain MRI brain with and without contrast as pt presented with weakness and  in the setting of metastatic disease need to r/o brain metastasis.   - will follow up with  pathology report.  - follow up GYN Oncology.  Patient is a 58 y/o F with a pmhx of lupus, hypothyroidism, antiphospholipid syndrome, HTN, anemia,  recently diagnosed  with  b/l adnexal masses and lung nodules, unknown primary disease (7/2022), presented with weakness and falls and admitted.    Oncology was consulted regarding management of ovarian masses and lung nodules.     #Bilateral adnexal masses with para-aortic lymphadenopathy and pulmonary nodules suggestive of metastatic disease, unknown primary site of origin (gyn vs metastatic ?[place])  # Biopsy of Left upper lobe Lung nodule- Adenocarcinoma. IHC, PDL-1 and NGS pending.  -Ca 19-9, Ca125 normal  -CEA 5.5  -US Transvaginal : right 6.6 cm heterogeneous mixed solid-cystic mass and lower pelvic 5.6 cm heterogenous solid appearing may represent ovarian mass or degenerating fibroid.  -CT A/P W contrast 7/2022: B/l confluent hypodensities in b/l adnexae measuring 6.8 cm x 4.5 cm, paraaortic lymphadenopathy and b/l solid pulmonary nodules largest measuring 8mm.  -Pathology of Left upper lobe lung nodule : adenocarcinoma. IHC stains pending, PDL1 and NGS pending.  -Although the primary may be ovarian, however breast or other etiologies should be considered given the normal Ca 125. Apparently, the breast examination has been negative.    Microcytic anemia:  -Received 5 doses of Venofer for Iron deficiency anemia in 7/2022 during prior admission.      Recommendations.  -Please obtain diagnostic mammogram of B/L breasts.  -Please obtain MRI brain with and without contrast as pt presented with weakness and  in the setting of metastatic disease need to r/o brain metastasis.   -Will follow up with  pathology report.  -Follow up GYN Oncology.   -Further recommendations for treatment after the above issues clarified.

## 2022-08-03 NOTE — CONSULT NOTE ADULT - ASSESSMENT
***INCOMPLETE: ADDITIONAL RECOMMENDATIONS PENDING***      To be discussed with Dr. 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 Would recommend additional review of pathological specimen as presentation is atypically for gynecological cancers    -recommend MRI abdomen-pelvis to further evaluate the pelvic mass as the CT was inconclusive   -recommend heme/onc consult  -recommend palliative medicine consult to discuss goals of care   -pain management per primary team   -management of comorbidities per primary team   -if additional pathologic review of specimen confirms likely ovarian carcinoma and the MRI is consistent, would recommend neoadjuvant chemotherapy      Discussed with Dr. Haq

## 2022-08-03 NOTE — CONSULT NOTE ADULT - SUBJECTIVE AND OBJECTIVE BOX
Chief Complaint: weakness and falls at home     HPI: 58 yo G0 LMP 7/10/22, known to the [facility] service (previously consulted on last admission on 7/15/2022) admitted to medicine for falls and increasing weakness.   During previous admission, pt admitted for new-onset nausea, pelvic and back pain. During evaluation during prior admission, CT demonstrated confluent hypodense lesions visualized involving bilateral adnexa measuring up to 6.8cmX4.5cm with diffuse rounded para-aortic lymphadenopathy with multiple scattered bilateral solid pulmonary nodules. Underwent an IR guided biopsy of a lung nodule on 7/21/2022 for tissue diagnosis. Preliminary results were favoring adenocarcinoma. Immunochemical stains were positive for CK-7, PAX-8, GATA3, CDX-2. Immunochemical stains were negative for CK20, p40, TTF-1/napsin. Pending NGS and PD-L1 stains   Please see additional consult note from 7/15/2022    Ob/Gyn History:  G0                 Cycle Length - [id number], lasting 10 days with light flow. Menarche 16 yos   Denies history of ovarian cysts, uterine fibroids, abnormal paps, or STIs  Last Pap Smear - 10 years ago (normal per patient)   Last Mammogram - never done  Last Colonoscopy - never done         Denies the following: constitutional symptoms, visual symptoms, cardiovascular symptoms, respiratory symptoms, GI symptoms, musculoskeletal symptoms, skin symptoms, neurologic symptoms, hematologic symptoms, allergic symptoms, psychiatric symptoms  Except any pertinent positives listed.     Home Medications:  aspirin 81 mg oral tablet, chewable: 1 tab(s) orally once a day (22 Jul 2022 13:38)  DULoxetine 60 mg oral delayed release capsule: 1 cap(s) orally once a day (14 Jul 2022 22:46)  hydroxychloroquine 200 mg oral tablet: 1 tab(s) orally 2 times a day (14 Jul 2022 22:44)  lisinopril 40 mg oral tablet: 1 tab(s) orally once a day (14 Jul 2022 22:44)  oxycodone-acetaminophen 5 mg-325 mg oral tablet: 1 tab(s) orally every 4 hours, As needed, Moderate Pain (4 - 6) (22 Jul 2022 13:38)  Synthroid 150 mcg (0.15 mg) oral tablet: 1 tab(s) orally once a day (14 Jul 2022 22:46)      Allergies    NSAIDs (Stomach Upset)    Intolerances        PAST MEDICAL & SURGICAL HISTORY:  Obesity   Lupus  Hypothyroidism  Iron deficiency anemia  Antiphospholipid syndrome   Hypertension   Gastritis   Fibromyalgia   No significant past surgical history    FAMILY HISTORY:  No pertinent family history in first degree relatives        SOCIAL HISTORY: Previous smoker (quite approx 40 years ago). Denies current cigarette use, alcohol use, or illicit drug use    Vital Signs Last 24 Hrs  T(F): 97.4 (03 Aug 2022 14:44), Max: 97.4 (03 Aug 2022 14:44)  HR: 99 (03 Aug 2022 14:44) (99 - 110)  BP: 134/70 (03 Aug 2022 14:44) (119/59 - 134/70)  RR: 19 (03 Aug 2022 14:44) (18 - 19)    General Appearance - AAOx3, NAD  Heart - S1S2 regular rate and rhythm  Lung - CTA Bilaterally  Abdomen - Soft, nontender, nondistended, no rebound, no rigidity, no guarding, bowel sounds present    GYN/Pelvis:      LABS:                        9.1    59.83 )-----------( 398      ( 03 Aug 2022 09:28 )             27.7         08-03    136  |  97<L>  |  23<H>  ----------------------------<  92  3.7   |  22  |  1.4    Ca    8.0<L>      03 Aug 2022 09:28  Phos  1.1     08-03  Mg     2.2     08-03    TUMOR MARKERS  Lactate Dehydrogenase: 669  Inhibin B, Serum: 18.8  Inhibin A Antibody: 9.9  Cancer Antigen, GI Ca 19-9: 19  Cancer Antigen, 125: 33        RADIOLOGY & ADDITIONAL STUDIES:  < from: US Kidney and Bladder (08.01.22 @ 12:32) >  ACC: [id number] EXAM:  US KIDNEYS AND BLADDER                        PROCEDURE DATE:  08/01/2022    INTERPRETATION:  CLINICAL INFORMATION: Worsening renal function.  COMPARISON: CT scan dated July 14, 2022.  TECHNIQUE: Sonography of the kidneys and bladder.  FINDINGS:    Right kidney: 9.9 cm. No hydronephrosis or calculi.    Left kidney:  9.8 cm. No hydronephrosis or calculi.    Urinary bladder: Pelvic mass causing compression upon the urinary bladder. The bladder itself isn't distended.    IMPRESSION:    Normal renal ultrasound.  Pelvic mass with compression on the urinary bladder.    --- End of Report ---      AZAEL RAZA MD; Attending Interventional Radiologist  This document has been electronically signed. Aug  1 2022 12:43PM  < end of copied text >      < from: [facility] Duplex Lower Ext Vein Scan, Bilat (08.01.22 @ 11:14) >  ACC: [id number] EXAM:  DUPLEX SCAN EXT VEINS LOWER BI                        PROCEDURE DATE:  08/01/2022    INTERPRETATION:  CLINICAL INFORMATION: 57-year-old female with lower extremity swelling  COMPARISON: None available.  TECHNIQUE: Duplex sonography of the BILATERAL LOWER extremity veins with color and spectral Doppler, with and without compression.  FINDINGS:    RIGHT: Normal compressibility of the RIGHT common femoral, femoral and popliteal veins. Doppler examination shows normal spontaneous and phasic flow. No RIGHT calf vein thrombosis is detected.    LEFT: Normal compressibility of the LEFT common femoral, femoral and popliteal veins.Doppler examination shows normal spontaneous and phasic flow.No LEFT calf vein thrombosis is detected.    IMPRESSION:  No evidence of deep venous thrombosis in either lower extremity.  Bilateral peroneal veins are not visualized      --- End of Report ---      VIGNESH RIVERO MD; Attending Vascular Surgery  This document has been electronically signed. Aug  1 2022  5:15PM  < end of copied text >      < from: US Transvaginal (07.15.22 @ 20:40) >  ACC: [id number] EXAM:  US TRANSVAGINAL                        PROCEDURE DATE:  07/15/2022    INTERPRETATION:  CLINICAL INFORMATION: Pelvic mass. Abdominal pain.  LMP: 07/05/2022  COMPARISON: CT abdomen and pelvis July 14, 2022.  TECHNIQUE:  Transabdominal pelvic sonogram only. Color and Spectral Doppler was performed. Patient declined transvaginal examination.  FINDINGS:  Uterus: 13.2 cm x 7.8 cm x 8.4 cm. Endometrium: 10 mm.    Ovaries not well delineated. Lower pelvic 5.6 cm heterogeneous, solid appearing structure without appreciable Doppler flow; may represent additional ovarian mass or degenerating fibroid.    Right pelvic 6.6 cm heterogeneous mixed solid-cystic structure; may represent ovarian mass.    No significant pelvic ascites appreciated.    IMPRESSION:  Right pelvic 6.6 cm heterogeneous mixed solid-cystic structure; may represent ovarian mass.  Lower pelvic 5.6 cm heterogeneous, solid appearing structure without appreciable Doppler flow; may represent additional ovarian mass or degenerating fibroid.    --- End of Report ---      ROXANNA SEXTON MD; Attending Radiologist  This document has been electronically signed. Jul 16 2022 11:21AM  < end of copied text >      < from: CT Abdomen and Pelvis w/ IV Cont (07.14.22 @ 18:24) >  ACC: [id number] EXAM:  CT ABDOMEN AND PELVIS IC                        PROCEDURE DATE:  07/14/2022    INTERPRETATION:  CLINICAL HISTORY / REASON FOR EXAM: Abdominal pain for one month.  TECHNIQUE: Contiguous axial CT images were obtained from the lower chest to the pubic symphysis following administration of 96 mL Omnipaque 350 intravenous contrast, 4 mL discarded. Oral contrast was not administered. Reformatted images in the coronal and sagittal planes were acquired.  COMPARISON CT: None  FINDINGS:    Limited exam, patient is making contact with the gantry resulting in significant artifact.    LOWER CHEST: Multiple scattered bilateral solid pulmonary nodules, largest of which measures 8 mm in the right middle lobe (4/9).    HEPATOBILIARY: Unremarkable.    SPLEEN: Unremarkable.    PANCREAS: Unremarkable.    ADRENAL GLANDS: Unremarkable.    KIDNEYS: No hydronephrosis bilaterally.    ABDOMINOPELVIC NODES: There is diffuse rounded para-aortic lymphadenopathy, measuring up to 1.7 cm in short axis.    PELVIC ORGANS: Confluent hypodense lesions are visualized possibly involving the bilateral adnexa (601/70-50), measuring up to 6.8 x 4.5 cm, however examination is limited as described above.    PERITONEUM/MESENTERY/BOWEL: No bowel obstruction or intraperitoneal free air.`    BONES/SOFT TISSUES: Degenerative changes to the thoracolumbar spine. No blastic or lytic lesions.    VASCULAR: Aorta is normal in caliber.      IMPRESSION:    Limited examination as described above.    Confluent hypodense lesions are visualized possibly involving the bilateral adnexa (601/70-50), measuring up to 6.8 x 4.5 cm. Recommend follow-up pelvic ultrasound.    Diffuse rounded para-aortic lymphadenopathy, measuring upto 1.7 cm in short axis, compatible with neoplastic process versus less likely infectious/inflammatory process. Recommend PET/CT and/or tissue sampling.    Multiple scattered bilateral solid pulmonary nodules, the largest of which measures 8 mmin the right middle lobe.    Overall findings are suspicious for underlying metastatic ovarian malignancy/lymphoma.    --- End of Report ---    LOWELL KRAMER MD; Resident Radiologist  This document has been electronically signed.  SAEID RETANA MD; Attending Radiologist  This document has been electronically signed. Jul 14 2022  7:29PM    < end of copied text >   Chief Complaint: weakness and falls at home     HPI: 56 yo G0 LMP 7/10/22, known to the [facility] service (previously consulted on last admission on 7/15/2022) admitted to medicine for falls and increasing weakness.   During previous admission, pt admitted for new-onset nausea, pelvic and back pain. During evaluation during prior admission, CT demonstrated confluent hypodense lesions visualized involving bilateral adnexa measuring up to 6.8cmX4.5cm with diffuse rounded para-aortic lymphadenopathy with multiple scattered bilateral solid pulmonary nodules. Underwent an IR guided biopsy of a lung nodule on 7/21/2022 for tissue diagnosis. Preliminary results were favoring adenocarcinoma. Immunochemical stains were positive for CK-7, PAX-8, GATA3, CDX-2. Immunochemical stains were negative for CK20, p40, TTF-1/napsin. Pending NGS and PD-L1 stains   Please see additional consult note from 7/15/2022    Ob/Gyn History:  G0                 Cycle Length - [id number], lasting 10 days with light flow. Menarche 16 yos   Denies history of ovarian cysts, uterine fibroids, abnormal paps, or STIs  Last Pap Smear - 10 years ago (normal per patient)   Last Mammogram - never done  Last Colonoscopy - never done         Denies the following: constitutional symptoms, visual symptoms, cardiovascular symptoms, respiratory symptoms, GI symptoms, musculoskeletal symptoms, skin symptoms, neurologic symptoms, hematologic symptoms, allergic symptoms, psychiatric symptoms  Except any pertinent positives listed.     Home Medications:  aspirin 81 mg oral tablet, chewable: 1 tab(s) orally once a day (22 Jul 2022 13:38)  DULoxetine 60 mg oral delayed release capsule: 1 cap(s) orally once a day (14 Jul 2022 22:46)  hydroxychloroquine 200 mg oral tablet: 1 tab(s) orally 2 times a day (14 Jul 2022 22:44)  lisinopril 40 mg oral tablet: 1 tab(s) orally once a day (14 Jul 2022 22:44)  oxycodone-acetaminophen 5 mg-325 mg oral tablet: 1 tab(s) orally every 4 hours, As needed, Moderate Pain (4 - 6) (22 Jul 2022 13:38)  Synthroid 150 mcg (0.15 mg) oral tablet: 1 tab(s) orally once a day (14 Jul 2022 22:46)      Allergies    NSAIDs (Stomach Upset)    Intolerances        PAST MEDICAL & SURGICAL HISTORY:  Obesity   Lupus  Hypothyroidism  Iron deficiency anemia  Antiphospholipid syndrome   Hypertension   Gastritis   Fibromyalgia   No significant past surgical history    FAMILY HISTORY:  No pertinent family history in first degree relatives        SOCIAL HISTORY: Previous smoker (quite approx 40 years ago). Denies current cigarette use, alcohol use, or illicit drug use    Vital Signs Last 24 Hrs  T(F): 97.4 (03 Aug 2022 14:44), Max: 97.4 (03 Aug 2022 14:44)  HR: 99 (03 Aug 2022 14:44) (99 - 110)  BP: 134/70 (03 Aug 2022 14:44) (119/59 - 134/70)  RR: 19 (03 Aug 2022 14:44) (18 - 19)    General Appearance - AAOx3, NAD  Heart - S1S2 regular rate and rhythm  Lung - CTA Bilaterally  Abdomen - Soft, nontender, nondistended, no rebound, no rigidity, no guarding, bowel sounds present    GYN/Pelvis:      LABS:                        9.1    59.83 )-----------( 398      ( 03 Aug 2022 09:28 )             27.7         08-03    136  |  97<L>  |  23<H>  ----------------------------<  92  3.7   |  22  |  1.4    Ca    8.0<L>      03 Aug 2022 09:28  Phos  1.1     08-03  Mg     2.2     08-03    TUMOR MARKERS  Lactate Dehydrogenase: 669  Inhibin B, Serum: 18.8  Inhibin A Antibody: 9.9  Cancer Antigen, GI Ca 19-9: 19  Cancer Antigen, 125: 33    Cytopathology - Non Gyn Report (07.21.22 @ 17:11)   Cytopathology - Non Gyn Report:   ACCESSION No: [id number]   Patient: KELSIE JUAREZ   Accession: [id number]   Collected Date/Time: 7/21/2022 17:11 EDT   Received Date/Time: 7/21/2022 17:38 EDT   Fine Needle Aspiration Addendum Report - Auth (Verified)   Immunohistochemical stains are performed and show the tumor is positive for CK7, PAX-8, GATA3, focally positive for CDX-2, negative for CK20, p40, TTF-1/napsin;  is notified on 8/1/2022. Additional IHC pending. NGS and PD-L1 staining are pending.   Verified by: Eva Restrepo M.D. (Electronic Signature)   Reported on: 08/01/22 14:30 EDT, [facility],   [address] [place] [address] Phone: [phone] Fax: [phone]   _________________________________________________________________   Fine Needle Aspiration Report - Auth (Verified)   Specimen(s) Submitted: Lung nodule, left upper lobe, CT-guided core bx   Final Diagnosis   LUNG NODULE, LEFT UPPER LOBE, CT-GUIDED BIOPSY AND IMPRINTS:   - POSITIVE FOR MALIGNANT CELLS. Adenocarcinoma. Immunohistochemical stains pending.   Verified by: Eva Restrepo M.D.   (Electronic Signature)   Reported on:07/22/22 11:50 EDT, [facility],   [facility], [address] [place] [id number]   Phone: [phone] Fax: [phone]     RADIOLOGY & ADDITIONAL STUDIES:  < from: US Kidney and Bladder (08.01.22 @ 12:32) >  ACC: [id number] EXAM:  US KIDNEYS AND BLADDER                        PROCEDURE DATE:  08/01/2022    INTERPRETATION:  CLINICAL INFORMATION: Worsening renal function.  COMPARISON: CT scan dated July 14, 2022.  TECHNIQUE: Sonography of the kidneys and bladder.  FINDINGS:    Right kidney: 9.9 cm. No hydronephrosis or calculi.    Left kidney:  9.8 cm. No hydronephrosis or calculi.    Urinary bladder: Pelvic mass causing compression upon the urinary bladder. The bladder itself isn't distended.    IMPRESSION:    Normal renal ultrasound.  Pelvic mass with compression on the urinary bladder.    --- End of Report ---      AZAEL RAZA MD; Attending Interventional Radiologist  This document has been electronically signed. Aug  1 2022 12:43PM  < end of copied text >      < from: [facility] Duplex Lower Ext Vein Scan, Bilat (08.01.22 @ 11:14) >  ACC: [id number] EXAM:  DUPLEX SCAN EXT VEINS LOWER BI                        PROCEDURE DATE:  08/01/2022    INTERPRETATION:  CLINICAL INFORMATION: 57-year-old female with lower extremity swelling  COMPARISON: None available.  TECHNIQUE: Duplex sonography of the BILATERAL LOWER extremity veins with color and spectral Doppler, with and without compression.  FINDINGS:    RIGHT: Normal compressibility of the RIGHT common femoral, femoral and popliteal veins. Doppler examination shows normal spontaneous and phasic flow. No RIGHT calf vein thrombosis is detected.    LEFT: Normal compressibility of the LEFT common femoral, femoral and popliteal veins.Doppler examination shows normal spontaneous and phasic flow.No LEFT calf vein thrombosis is detected.    IMPRESSION:  No evidence of deep venous thrombosis in either lower extremity.  Bilateral peroneal veins are not visualized      --- End of Report ---      VIGNESH RIVERO MD; Attending Vascular Surgery  This document has been electronically signed. Aug  1 2022  5:15PM  < end of copied text >      < from: US Transvaginal (07.15.22 @ 20:40) >  ACC: [id number] EXAM:  US TRANSVAGINAL                        PROCEDURE DATE:  07/15/2022    INTERPRETATION:  CLINICAL INFORMATION: Pelvic mass. Abdominal pain.  LMP: 07/05/2022  COMPARISON: CT abdomen and pelvis July 14, 2022.  TECHNIQUE:  Transabdominal pelvic sonogram only. Color and Spectral Doppler was performed. Patient declined transvaginal examination.  FINDINGS:  Uterus: 13.2 cm x 7.8 cm x 8.4 cm. Endometrium: 10 mm.    Ovaries not well delineated. Lower pelvic 5.6 cm heterogeneous, solid appearing structure without appreciable Doppler flow; may represent additional ovarian mass or degenerating fibroid.    Right pelvic 6.6 cm heterogeneous mixed solid-cystic structure; may represent ovarian mass.    No significant pelvic ascites appreciated.    IMPRESSION:  Right pelvic 6.6 cm heterogeneous mixed solid-cystic structure; may represent ovarian mass.  Lower pelvic 5.6 cm heterogeneous, solid appearing structure without appreciable Doppler flow; may represent additional ovarian mass or degenerating fibroid.    --- End of Report ---      ROXANNA SEXTON MD; Attending Radiologist  This document has been electronically signed. Jul 16 2022 11:21AM  < end of copied text >      < from: CT Abdomen and Pelvis w/ IV Cont (07.14.22 @ 18:24) >  ACC: [id number] EXAM:  CT ABDOMEN AND PELVIS IC                        PROCEDURE DATE:  07/14/2022    INTERPRETATION:  CLINICAL HISTORY / REASON FOR EXAM: Abdominal pain for one month.  TECHNIQUE: Contiguous axial CT images were obtained from the lower chest to the pubic symphysis following administration of 96 mL Omnipaque 350 intravenous contrast, 4 mL discarded. Oral contrast was not administered. Reformatted images in the coronal and sagittal planes were acquired.  COMPARISON CT: None  FINDINGS:    Limited exam, patient is making contact with the gantry resulting in significant artifact.    LOWER CHEST: Multiple scattered bilateral solid pulmonary nodules, largest of which measures 8 mm in the right middle lobe (4/9).    HEPATOBILIARY: Unremarkable.    SPLEEN: Unremarkable.    PANCREAS: Unremarkable.    ADRENAL GLANDS: Unremarkable.    KIDNEYS: No hydronephrosis bilaterally.    ABDOMINOPELVIC NODES: There is diffuse rounded para-aortic lymphadenopathy, measuring up to 1.7 cm in short axis.    PELVIC ORGANS: Confluent hypodense lesions are visualized possibly involving the bilateral adnexa (601/70-50), measuring up to 6.8 x 4.5 cm, however examination is limited as described above.    PERITONEUM/MESENTERY/BOWEL: No bowel obstruction or intraperitoneal free air.`    BONES/SOFT TISSUES: Degenerative changes to the thoracolumbar spine. No blastic or lytic lesions.    VASCULAR: Aorta is normal in caliber.      IMPRESSION:    Limited examination as described above.    Confluent hypodense lesions are visualized possibly involving the bilateral adnexa (601/70-50), measuring up to 6.8 x 4.5 cm. Recommend follow-up pelvic ultrasound.    Diffuse rounded para-aortic lymphadenopathy, measuring upto 1.7 cm in short axis, compatible with neoplastic process versus less likely infectious/inflammatory process. Recommend PET/CT and/or tissue sampling.    Multiple scattered bilateral solid pulmonary nodules, the largest of which measures 8 mmin the right middle lobe.    Overall findings are suspicious for underlying metastatic ovarian malignancy/lymphoma.    --- End of Report ---    LOWELL KRAMER MD; Resident Radiologist  This document has been electronically signed.  SAEID RETANA MD; Attending Radiologist  This document has been electronically signed. Jul 14 2022  7:29PM    < end of copied text >   Chief Complaint: weakness and falls at home     HPI: 58 yo G0 LMP 7/10/22, known to the [facility] service (previously consulted on last admission on 7/15/2022) admitted to medicine for falls and increasing weakness.   During previous admission, pt admitted for new-onset nausea, pelvic and back pain. During evaluation during prior admission, CT demonstrated confluent hypodense lesions visualized involving bilateral adnexa measuring up to 6.8cmX4.5cm with diffuse rounded para-aortic lymphadenopathy with multiple scattered bilateral solid pulmonary nodules. Underwent an IR guided biopsy of a lung nodule on 7/21/2022 for tissue diagnosis. Preliminary results were favoring adenocarcinoma. Immunochemical stains were positive for CK-7, PAX-8, GATA3, CDX-2. Immunochemical stains were negative for CK20, p40, TTF-1/napsin. Pending NGS and PD-L1 stains     Ob/Gyn History:  G0                 Cycle Length - [id number], lasting 10 days with light flow. Menarche 16 yos   Denies history of ovarian cysts, uterine fibroids, abnormal paps, or STIs  Last Pap Smear - 10 years ago (normal per patient)   Last Mammogram - never done  Last Colonoscopy - never done         Denies the following: constitutional symptoms, visual symptoms, cardiovascular symptoms, respiratory symptoms, GI symptoms, musculoskeletal symptoms, skin symptoms, neurologic symptoms, hematologic symptoms, allergic symptoms, psychiatric symptoms  Except any pertinent positives listed.     Home Medications:  aspirin 81 mg oral tablet, chewable: 1 tab(s) orally once a day (22 Jul 2022 13:38)  DULoxetine 60 mg oral delayed release capsule: 1 cap(s) orally once a day (14 Jul 2022 22:46)  hydroxychloroquine 200 mg oral tablet: 1 tab(s) orally 2 times a day (14 Jul 2022 22:44)  lisinopril 40 mg oral tablet: 1 tab(s) orally once a day (14 Jul 2022 22:44)  oxycodone-acetaminophen 5 mg-325 mg oral tablet: 1 tab(s) orally every 4 hours, As needed, Moderate Pain (4 - 6) (22 Jul 2022 13:38)  Synthroid 150 mcg (0.15 mg) oral tablet: 1 tab(s) orally once a day (14 Jul 2022 22:46)      Allergies    NSAIDs (Stomach Upset)    Intolerances        PAST MEDICAL & SURGICAL HISTORY:  Obesity   Lupus  Hypothyroidism  Iron deficiency anemia  Antiphospholipid syndrome   Hypertension   Gastritis   Fibromyalgia   No significant past surgical history    FAMILY HISTORY:  No pertinent family history in first degree relatives        SOCIAL HISTORY: Previous smoker (quite approx 40 years ago). Denies current cigarette use, alcohol use, or illicit drug use    Vital Signs Last 24 Hrs  T(F): 97.4 (03 Aug 2022 14:44), Max: 97.4 (03 Aug 2022 14:44)  HR: 99 (03 Aug 2022 14:44) (99 - 110)  BP: 134/70 (03 Aug 2022 14:44) (119/59 - 134/70)  RR: 19 (03 Aug 2022 14:44) (18 - 19)    General Appearance - AAOx3, NAD  Heart - S1S2 regular rate and rhythm  Lung - CTA Bilaterally  Abdomen - Soft, nontender, nondistended, no rebound, no rigidity, no guarding, bowel sounds present    GYN/Pelvis:      LABS:                        9.1    59.83 )-----------( 398      ( 03 Aug 2022 09:28 )             27.7         08-03    136  |  97<L>  |  23<H>  ----------------------------<  92  3.7   |  22  |  1.4    Ca    8.0<L>      03 Aug 2022 09:28  Phos  1.1     08-03  Mg     2.2     08-03    TUMOR MARKERS  Lactate Dehydrogenase: 669  Inhibin B, Serum: 18.8  Inhibin A Antibody: 9.9  Cancer Antigen, GI Ca 19-9: 19  Cancer Antigen, 125: 33    Cytopathology - Non Gyn Report (07.21.22 @ 17:11)   Cytopathology - Non Gyn Report:   ACCESSION No: [id number]   Patient: KELSIE JUAREZ   Accession: [id number]   Collected Date/Time: 7/21/2022 17:11 EDT   Received Date/Time: 7/21/2022 17:38 EDT   Fine Needle Aspiration Addendum Report - Auth (Verified)   Immunohistochemical stains are performed and show the tumor is positive for CK7, PAX-8, GATA3, focally positive for CDX-2, negative for CK20, p40, TTF-1/napsin;  is notified on 8/1/2022. Additional IHC pending. NGS and PD-L1 staining are pending.   Verified by: Eva Restrepo M.D. (Electronic Signature)   Reported on: 08/01/22 14:30 EDT, [facility],   [address] [place] [address] Phone: [phone] Fax: [phone]   _________________________________________________________________   Fine Needle Aspiration Report - Auth (Verified)   Specimen(s) Submitted: Lung nodule, left upper lobe, CT-guided core bx   Final Diagnosis   LUNG NODULE, LEFT UPPER LOBE, CT-GUIDED BIOPSY AND IMPRINTS:   - POSITIVE FOR MALIGNANT CELLS. Adenocarcinoma. Immunohistochemical stains pending.   Verified by: Eva Restrepo M.D.   (Electronic Signature)   Reported on:07/22/22 11:50 EDT, [facility],   [place] [address]   Phone: [phone] Fax: [phone]     RADIOLOGY & ADDITIONAL STUDIES:  < from: US Kidney and Bladder (08.01.22 @ 12:32) >  ACC: [id number] EXAM:  US KIDNEYS AND BLADDER                        PROCEDURE DATE:  08/01/2022    INTERPRETATION:  CLINICAL INFORMATION: Worsening renal function.  COMPARISON: CT scan dated July 14, 2022.  TECHNIQUE: Sonography of the kidneys and bladder.  FINDINGS:    Right kidney: 9.9 cm. No hydronephrosis or calculi.    Left kidney:  9.8 cm. No hydronephrosis or calculi.    Urinary bladder: Pelvic mass causing compression upon the urinary bladder. The bladder itself isn't distended.    IMPRESSION:    Normal renal ultrasound.  Pelvic mass with compression on the urinary bladder.    --- End of Report ---      AZAEL RAZA MD; Attending Interventional Radiologist  This document has been electronically signed. Aug  1 2022 12:43PM  < end of copied text >      < from: [facility] Duplex Lower Ext Vein Scan, Bilat (08.01.22 @ 11:14) >  ACC: [id number] EXAM:  DUPLEX SCAN EXT VEINS LOWER BI                        PROCEDURE DATE:  08/01/2022    INTERPRETATION:  CLINICAL INFORMATION: 57-year-old female with lower extremity swelling  COMPARISON: None available.  TECHNIQUE: Duplex sonography of the BILATERAL LOWER extremity veins with color and spectral Doppler, with and without compression.  FINDINGS:    RIGHT: Normal compressibility of the RIGHT common femoral, femoral and popliteal veins. Doppler examination shows normal spontaneous and phasic flow. No RIGHT calf vein thrombosis is detected.    LEFT: Normal compressibility of the LEFT common femoral, femoral and popliteal veins.Doppler examination shows normal spontaneous and phasic flow.No LEFT calf vein thrombosis is detected.    IMPRESSION:  No evidence of deep venous thrombosis in either lower extremity.  Bilateral peroneal veins are not visualized      --- End of Report ---      VIGNESH RIVERO MD; Attending Vascular Surgery  This document has been electronically signed. Aug  1 2022  5:15PM  < end of copied text >      < from: US Transvaginal (07.15.22 @ 20:40) >  ACC: [id number] EXAM:  US TRANSVAGINAL                        PROCEDURE DATE:  07/15/2022    INTERPRETATION:  CLINICAL INFORMATION: Pelvic mass. Abdominal pain.  LMP: 07/05/2022  COMPARISON: CT abdomen and pelvis July 14, 2022.  TECHNIQUE:  Transabdominal pelvic sonogram only. Color and Spectral Doppler was performed. Patient declined transvaginal examination.  FINDINGS:  Uterus: 13.2 cm x 7.8 cm x 8.4 cm. Endometrium: 10 mm.    Ovaries not well delineated. Lower pelvic 5.6 cm heterogeneous, solid appearing structure without appreciable Doppler flow; may represent additional ovarian mass or degenerating fibroid.    Right pelvic 6.6 cm heterogeneous mixed solid-cystic structure; may represent ovarian mass.    No significant pelvic ascites appreciated.    IMPRESSION:  Right pelvic 6.6 cm heterogeneous mixed solid-cystic structure; may represent ovarian mass.  Lower pelvic 5.6 cm heterogeneous, solid appearing structure without appreciable Doppler flow; may represent additional ovarian mass or degenerating fibroid.    --- End of Report ---      ROXANNA SEXTON MD; Attending Radiologist  This document has been electronically signed. Jul 16 2022 11:21AM  < end of copied text >      < from: CT Abdomen and Pelvis w/ IV Cont (07.14.22 @ 18:24) >  ACC: [id number] EXAM:  CT ABDOMEN AND PELVIS IC                        PROCEDURE DATE:  07/14/2022    INTERPRETATION:  CLINICAL HISTORY / REASON FOR EXAM: Abdominal pain for one month.  TECHNIQUE: Contiguous axial CT images were obtained from the lower chest to the pubic symphysis following administration of 96 mL Omnipaque 350 intravenous contrast, 4 mL discarded. Oral contrast was not administered. Reformatted images in the coronal and sagittal planes were acquired.  COMPARISON CT: None  FINDINGS:    Limited exam, patient is making contact with the gantry resulting in significant artifact.    LOWER CHEST: Multiple scattered bilateral solid pulmonary nodules, largest of which measures 8 mm in the right middle lobe (4/9).    HEPATOBILIARY: Unremarkable.    SPLEEN: Unremarkable.    PANCREAS: Unremarkable.    ADRENAL GLANDS: Unremarkable.    KIDNEYS: No hydronephrosis bilaterally.    ABDOMINOPELVIC NODES: There is diffuse rounded para-aortic lymphadenopathy, measuring up to 1.7 cm in short axis.    PELVIC ORGANS: Confluent hypodense lesions are visualized possibly involving the bilateral adnexa (601/70-50), measuring up to 6.8 x 4.5 cm, however examination is limited as described above.    PERITONEUM/MESENTERY/BOWEL: No bowel obstruction or intraperitoneal free air.`    BONES/SOFT TISSUES: Degenerative changes to the thoracolumbar spine. No blastic or lytic lesions.    VASCULAR: Aorta is normal in caliber.      IMPRESSION:    Limited examination as described above.    Confluent hypodense lesions are visualized possibly involving the bilateral adnexa (601/70-50), measuring up to 6.8 x 4.5 cm. Recommend follow-up pelvic ultrasound.    Diffuse rounded para-aortic lymphadenopathy, measuring upto 1.7 cm in short axis, compatible with neoplastic process versus less likely infectious/inflammatory process. Recommend PET/CT and/or tissue sampling.    Multiple scattered bilateral solid pulmonary nodules, the largest of which measures 8 mmin the right middle lobe.    Overall findings are suspicious for underlying metastatic ovarian malignancy/lymphoma.    --- End of Report ---    LOWELL KRAMER MD; Resident Radiologist  This document has been electronically signed.  SAEID RETANA MD; Attending Radiologist  This document has been electronically signed. Jul 14 2022  7:29PM    < end of copied text >

## 2022-08-03 NOTE — CONSULT NOTE ADULT - ATTENDING COMMENTS
58 y/o with disseminated cancer, with abdominal lymphadenopathy, lung mets and leukocytosis.    Previously admitted and had IR Bx which is suggestive of a gyn primary.    Multiple medical co morbidities and frailty preclude surgery at this point in time.    Recommend:  -MRI of the abdomen and pelvis as the CT images were suboptimal due to lack of contrast.    -Neoadjuvant chemotherapy after the cause of her leukocytosis has been elucidated (very uncommon in gyn primaries)    -Review of the Bx material to assure this Dx is correct. If any uncertainty, a second opinion should be obtained

## 2022-08-04 NOTE — PROGRESS NOTE ADULT - SUBJECTIVE AND OBJECTIVE BOX
KELSIE JUAREZ  57y  Female  ***My note supersedes ALL resident notes that I sign.  My corrections for their notes are in my note.***    I can be reached directly on [employer]. My office number is [phone]. My personal cell number is [phone].    INTERVAL EVENTS: Here for f/u of cancer. Pt says diarrhea is helped by imodium. She can move legs well in bed, but cannot stand. She has no breast masses that are palpable. She has never had a mammo in her life. She has no GI symptoms to suggest and issue. Has pain in lower abd (midline area), which is the same and controlled w/ percocet. Denies SOB or CP.    T(F): 98.9 (08-03-22 @ 21:52), Max: 98.9 (08-03-22 @ 21:52)  HR: 111 (08-03-22 @ 21:52) (99 - 111)  BP: 129/62 (08-03-22 @ 21:52) (129/62 - 134/70)  RR: 18 (08-03-22 @ 21:52) (18 - 19)  SpO2: --    Gen: NAD  HEENT: PERRL, EOMI, mouth clr, nose clr  Neck: no nodes, no JVD, thyroid nl  lungs: clr  hrt: s1 s2 reg, tachy (HR 90 on my exam), no murmur  abd: soft, obese, ND, mildly tender lower abd; no HS megaly; hard to eval for mass given body habitus  ext: 1+-2+ edema, no c/c  neuro: aa ox3, cn intact, can move all 4 ext, but legs very weak    LABS:                      8.6     (    78.0   57.25 )-----------( ---------      366      ( 04 Aug 2022 08:48 )             26.2    (    22.2     133   (   96   (   79      08-04-22 @ 08:48  ----------------------               3.7   (   22   (   27                             -----                        1.5  Ca  7.8   Mg  2.1    P   0.9     CAPILLARY BLOOD GLUCOSE  POCT Blood Glucose.: 104 (08-04-22 @ 12:22)  POCT Blood Glucose.: 96 (08-04-22 @ 08:40)  POCT Blood Glucose.: 94 (08-03-22 @ 21:25)  POCT Blood Glucose.: 99 (08-03-22 @ 17:39)  POCT Blood Glucose.: 102 (08-03-22 @ 11:50)  POCT Blood Glucose.: 104 (08-03-22 @ 08:19)  POCT Blood Glucose.: 108 (08-02-22 @ 21:38)  POCT Blood Glucose.: 122 (08-02-22 @ 17:09)    Culture - Blood (collected 08-02-22 @ 12:13)  Source: .Blood None  Preliminary Report (08-03-22 @ 23:01):    No growth to date.    Culture - Blood (collected 08-01-22 @ 06:38)  Source: .Blood None  Preliminary Report (08-02-22 @ 18:02):    No growth to date.    RADIOLOGY & ADDITIONAL TESTS:    MEDICATIONS:  hydroxychloroquine 200 milliGRAM(s) Oral two times a day    allopurinol 300 milliGRAM(s) Oral two times a day  aluminum hydroxide/magnesium hydroxide/simethicone Suspension 30 milliLiter(s) Oral every 4 hours PRN  aspirin  chewable 81 milliGRAM(s) Oral daily  DULoxetine 60 milliGRAM(s) Oral daily  ferrous    sulfate 325 milliGRAM(s) Oral daily  heparin   Injectable 5000 Unit(s) SubCutaneous three times a day  levothyroxine 150 MICROGram(s) Oral daily  loperamide 2 milliGRAM(s) Oral every 4 hours PRN  NIFEdipine XL 60 milliGRAM(s) Oral daily  nystatin Powder 1 Application(s) Topical two times a day  ondansetron    Tablet 4 milliGRAM(s) Oral every 6 hours PRN  oxycodone    5 mG/acetaminophen 325 mG 1 Tablet(s) Oral every 4 hours PRN  potassium phosphate / sodium phosphate Powder (PHOS-NaK) 1 Packet(s) Oral three times a day with meals  simethicone 80 milliGRAM(s) Chew four times a day PRN

## 2022-08-04 NOTE — PROGRESS NOTE ADULT - SUBJECTIVE AND OBJECTIVE BOX
KELSIE JUAREZ 57y Female  MRN#: [id number]   Hospital Day: 5d    SUBJECTIVE  Patient is a 57y old Female who presents with a chief complaint of poor ambulation (02 Aug 2022 15:42)  Currently admitted to medicine with the primary diagnosis of GOYO (acute kidney injury)      INTERVAL HPI AND OVERNIGHT EVENTS:  Patient was examined and seen at bedside. This morning she is resting in bed, continues to report having diarrhea. Pt endorsed having some abdominal pain/discomfort in the AM but improved with administration of her medication. Otherwise pt is still bed bound from fatigue. No other events, vital signs stable overnight.     OBJECTIVE  PAST MEDICAL & SURGICAL HISTORY  Lupus    Hypothyroidism    Iron deficiency anemia    No significant past surgical history      ALLERGIES:  NSAIDs (Stomach Upset)    MEDICATIONS:  STANDING MEDICATIONS  allopurinol 300 milliGRAM(s) Oral two times a day  aspirin  chewable 81 milliGRAM(s) Oral daily  dextrose 5%. 1000 milliLiter(s) IV Continuous <Continuous>  dextrose 5%. 1000 milliLiter(s) IV Continuous <Continuous>  dextrose 50% Injectable 25 Gram(s) IV Push once  dextrose 50% Injectable 12.5 Gram(s) IV Push once  dextrose 50% Injectable 25 Gram(s) IV Push once  DULoxetine 60 milliGRAM(s) Oral daily  ferrous    sulfate 325 milliGRAM(s) Oral daily  furosemide    Tablet 20 milliGRAM(s) Oral daily  glucagon  Injectable 1 milliGRAM(s) IntraMuscular once  heparin   Injectable 5000 Unit(s) SubCutaneous three times a day  hydroxychloroquine 200 milliGRAM(s) Oral two times a day  levothyroxine 150 MICROGram(s) Oral daily  nystatin Powder 1 Application(s) Topical two times a day  potassium phosphate / sodium phosphate Powder (PHOS-NaK) 1 Packet(s) Oral three times a day with meals  sodium chloride 0.9%. 1000 milliLiter(s) IV Continuous <Continuous>    PRN MEDICATIONS  aluminum hydroxide/magnesium hydroxide/simethicone Suspension 30 milliLiter(s) Oral every 4 hours PRN  dextrose Oral Gel 15 Gram(s) Oral once PRN  loperamide 2 milliGRAM(s) Oral every 4 hours PRN  ondansetron    Tablet 4 milliGRAM(s) Oral every 6 hours PRN  oxycodone    5 mG/acetaminophen 325 mG 1 Tablet(s) Oral every 4 hours PRN  simethicone 80 milliGRAM(s) Chew four times a day PRN      VITAL SIGNS: Last 24 Hours  T(C): 36.9 (04 Aug 2022 14:29), Max: 37.2 (03 Aug 2022 21:52)  T(F): 98.4 (04 Aug 2022 14:29), Max: 98.9 (03 Aug 2022 21:52)  HR: 119 (04 Aug 2022 14:29) (111 - 119)  BP: 115/53 (04 Aug 2022 14:29) (115/53 - 129/62)  BP(mean): --  RR: 18 (04 Aug 2022 14:29) (18 - 18)  SpO2: --    LABS:                        8.6    57.25 )-----------( 366      ( 04 Aug 2022 08:48 )             26.2     08-04    133<L>  |  96<L>  |  27<H>  ----------------------------<  79  3.7   |  22  |  1.5    Ca    7.8<L>      04 Aug 2022 08:48  Phos  0.9     08-04  Mg     2.1     08-04                Culture - Blood (collected 02 Aug 2022 12:13)  Source: .Blood None  Preliminary Report (03 Aug 2022 23:01):    No growth to date.          RADIOLOGY:  RU Q US 8/1/22: Cholelithiasis without ductal dilation   US KB 8/1/22: Normal renal ultrasound, pelvic mass w/ compression on urinary bladder  Venous LE duplex 8/1: No evidence of deep venous thrombosis in either lower extremity. Bilateral peroneal veins are not visualized  CT chest w/ IV con 7/15: Right pelvic 6.6 cm heterogeneous mixed solid-cystic structure; may   represent ovarian mass. Lower pelvic 5.6 cm heterogeneous, solid appearing structure without   appreciable Doppler flow; may represent additional ovarian mass or degenerating fibroid.    PHYSICAL EXAM:  CONSTITUTIONAL: No acute distress, obese, well-groomed, cooperative, AAOx3  PULMONARY: Clear to auscultation bilaterally; no wheezes, rales, or rhonchi  CARDIOVASCULAR: Regular rate and rhythm; no murmurs, rubs, or gallops  GASTROINTESTINAL: Soft, rounded obese abdomen, bowel sounds present, non tender, red dry rash present under skin folds  MUSCULOSKELETAL: 3+ pitting edema b/l, distal pulses unable to palpate, no clubbing/cyanosis

## 2022-08-04 NOTE — PROGRESS NOTE ADULT - ASSESSMENT
Pt is a 56 y/o F with PMHx of lupus, hypothyroidism, antiphospholipid syndrome, HTN, and anemia who presented to the ER complaining of diffuse pelvic pain and back pain x 3 weeks associated with decreased appetite and vomiting over the last 8 days.    # COVID + on last admit  was exposed 7/16 to another pt in hosp w/ COVID;  also had COVID  1st COVID + 7/21 -> 21 days of isolation -> to 8/11  pt s/p Paxlovid as outpt recently  seems a little symptomatic (sinuses) now  NOT on O2 -> no steroids  this is NOT why WBC high    # diarrhea - not sure why  C Diff neg; GI pcr neg  c/w imodium prn - helping    # leg edema; HTN  lisinopril off (GOYO)  lasix 20mg po q24  decr Nifed XL 30mg po q24    # hypophosphatemia  prob effect of PTHRP and malnutrition w/ refeeding syndrome  Na-Phos 30 mmol over 6 hrs iv today x1  neutraphos 1 pkt po 3x/day w/ meals  rpt phos q24 til >2    # hypercalcemia - seems better now  iPTH < 20 -> hyperpara RULED OUT  likely malign in origin - PTHrP 11 (nl <2) and + adenoCa on bx  zofran 4mg po q6 prn n/v   s/p Pamidronate 90mg iv x1 last admit  IVFs: none  25 OH Vit D 74 (upper limit of nl)    # confirmed malignancy (adenoCa): b/l adnexal lesions (primary vs metastatic ([place])); paraaortic LN; 15+ pulm nodules; -> unknown primary site  CT C: 15+ pulm nod up to 1.7cm - IR s/p bx of lung nod 7/21  f/u path: + adenoCa, IHC still incomplete  CT A/P: bilateral adnexal hypodensities, para-aortic lymphadenopathy as well as bilateral pulm nodules  TVUS: Rt 6.6cm mixed solid-cystic structure; Lt 5.6 cm heterog, solid structure w/ doppler flow   H/O f/u - spoke w/ Dr Thurston  Gyn/Onc f/u  : 33 (nl); CA 19-9: 19 (nl); CEA 5.5; ; HCG < 0.6; S preg neg; ESR 80;  uric acid 6.7 -> 11.5 (prob cell turnover) - on allopurinol 300mg po q12 (f/u h/o)  Inhibin A: neg; B: slightly elevated   check CA 15-3 and CA 27-29  obtain MRI B w/ gado  will hold off on mammogram (logistically too hard to obtain in obese pt who cannot stand and machine in another building)  percocet 5/325mg po q4 prn neopl-related pain     # marked leukocytosis and tachycardia = SIRS 2/2 malign  prob reactive   bld cx all neg   no abx for now  BCR/ABL neg; JAK2 neg; flow cyto neg; CALR neg; MPL mut analysis neg   consider BM Bx? - f/u h/o  cbc NOT needed daily    # microcytic anemia  prob of malign  iron sat 10%, TIBC 314, Ferr 85 = could be iron def  no IV iron for now given SIRS  FeSO4 325mg po q24  B12, Fol nl  hapto 262   (upper nl)  retic 2.7%; t red 0.3   - this is NOT hemolysis, even intramedullary; I suspect this is a marker for malign or cell turnover    # minor GOYO - prob dehydration  Renal U/S: no hydro  oral hydration  BMP q48    # prior thrombocytosis - now better    # abnl LFTs mild (incr AP and slight incr AST)  prob related to underlying malign  RUQ U/S: GB stones w/out cholecystitis  no further w/u for now  no need to follow LFTs    # lupus - does not seem active currently  c/w HCQ 200mg po q12  c/w cymbalta    # antiphospholipid syndrome  prob just element of lupus hx  no clots; only 1 miscarriage  cont asa   no a/c    # Hypothyroidism  c/w levothyroxine 150mg oral daily  TSH 8.61 - would NOT raise dose given tachycardia  f/u free T4 and free T3    # no hx of DM  d/c all FS - they are always nl    # DVT ppx: hep 5000 sc q8    # GI ppx: none    # Activity: used to walk and cannot now; s/p fall at home; PT eval    # Code: full    Dispo: f/u tissue bx; f/u H/O and G/O; MRI B; tumor markers; d/c FS; f/u labs above; tx phos; tx pain; tx diarrhea; check FT4/FT3; start lasix  eventually, pt will need STR, either 4A or SNF - not ready for d/c yet    Prog is very guarded.

## 2022-08-04 NOTE — PROGRESS NOTE ADULT - ASSESSMENT
ASSESSMENT & PLAN  # 58 y/o F with PMHx of lupus, hypothyroidism, antiphospholipid syndrome, HTN, and anemia complaining of pelvic and back pain for 3 weeks with decreased appetite and vomiting for 8 days admitted for progressive generalized weakness and fall.     #Generalized weakness  #Fall  #covid+ prior admission  - h/o general weakness from previous covid infections, reports sxs improve about 1-2weeks after recovery  - 1st covid +ve 7/21 needs 21 days of isolation to 8/11  - s/p paxlovid outpatient  - bed bound pt states too weak to remain standing  - c/w PT possible d/c to STR  - orthostatics -ve  - , -ve    # suspect malignancy: b/l adnexal lesions; paraaortic LN; 15+ pulm nodules; NO B symptoms from 7/14 admission w/u:   #leukocytosis with neutrophilic predominance  - CT Chest: 15+ pulm nod up to 1.7cm -s/p IR bx of lung nod 7/21. 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 Monitor BMs   - WBC 60k, previous admission 20k    - hx of diarrhea gi pcr and c.diff negative  - leukemoid rxn vs MDS vs Hematologic malignancy  - BCx 8/21 -ve UA -ve no active infection source  - med onc and gyn onc consulted     #GOYO/HAGMA  #Hypokalemia, hypophosphatemia   - suspected PTHrP and mulnutrition w/ refeeding syndrome  - Uric acid 11  - possibly 2/2 tumor burden (WBC 50k w/ NT predominance)  - allopurinol 300 BID  - NS @ 100cc  - holding lisinopril   - Onc eval 8/4 for recs  - Mg 2.2  - Phos 1.1   - Ca 8.0  - c/w electrolyte correction     #Diarrhea, improving  - gi pcr and c. diff negative  - c/w on loperamide and simethicone     #Alk phos elevation   - RUQ US - cholelithiasis with ductal dilation   - trending 451> 457    # microcytic hypochromic anemia  - Last admission:   iron sat 10%, TIBC 314, Ferr 85 = could be iron def  - no IV iron for now  - FeSO4 325mg po q24  - B12, Fol nl  - hapto 262  - MMA level normal  - retic 2.7%; t red 0.3  -  - this is NOT hemolysis, even intramedullary; possible marker for lymphoma    # antiphospholipid syndrome  - no hx of clots; only 1 miscarriage  - on aspirin  - no a/c    # lupus - not in active flare  - c/w HCQ 200mg po q12   - c/w cymbalta    # HTN  #leg edema  #GOYO  - held home lisinopril 40mg oral daily in setting of GOYO - Cr. 1.5 w/ baseline ~0.8  - nifedipine adjusted to 30mg POD  - lasix 20mg q24hr    # Hypothyroidism  - c/w levothyroxine 150mg oral daily    #Misc  - DVT Prophylaxis: hep SQ TID   - Diet: DASH/TLC  - GI Prophylaxis: none  - Activity: bed bound  - IV Fluids: NS 100cc  - Code Status: FULL    Dispo: [facility]   ASSESSMENT & PLAN  # 58 y/o F with PMHx of lupus, hypothyroidism, antiphospholipid syndrome, HTN, and anemia complaining of pelvic and back pain for 3 weeks with decreased appetite and vomiting for 8 days admitted for progressive generalized weakness and fall.     #Generalized weakness  #Fall  #covid+ prior admission  - h/o general weakness from previous covid infections, reports sxs improve about 1-2weeks after recovery  - 1st covid +ve 7/21 needs 21 days of isolation to 8/11  - s/p paxlovid outpatient  - bed bound pt states too weak to remain standing  - c/w PT possible d/c to STR  - orthostatics -ve  - , -ve    #confirmed malignancy (AdenoCA): b/l adnexal lesions; paraaortic LN; 15+ pulm nodules;   #leukocytosis with neutrophilic predominance  - CT Chest: 15+ pulm nod up to 1.7cm -s/p IR bx of lung nod 7/21. 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  - Pending final path report and IHC  - CT A/P: bilateral adnexal hypodensities, para-aortic lymphadenopathy as well as bilateral pulm nodules  - TVUS: Rt 6.6cm mixed solid-cystic structure; Lt 5.6 cm heterog, solid structure w/ doppler flow   - : 33 (nl); CA 19-9: 19 (nl); CEA 5.5; ; HCG < 0.6; S preg neg; ESR 80; uric acid 6.7 Inhibin A: neg; B: slightly elevated   - Heme/onc and gyn/onc following - want MRI brain w/gadolinium, MRI abdomen and pelvis and breast mammogram   - Pt unable to fit in MRI machine and difficult to obtain mammogram due to patients inability to stand  - CA 15-3 and CA 27-29 ordered  - CT head w/ IV contrast, pt on LR @75cc to improve GOYO   - c/w Dilaudid 2mg po q6 prn. Monitor BMs     #GOYO/HAGMA  #Hypokalemia  #Hypophosphatemia  #Hypercalcemia  - suspected PTHrP and mulnutrition w/ refeeding syndrome  - Na-Phos 30mmol over 6hs IV 1x today  - Neutraphos 1packet TID w/ meals  - Phos q24 until >2 last 0.9, repeat in AM  - iPTH <20, PTHrP 11  - s/p pamidronate 90mg 1x previous admission  - zofran 4mg PO q6hr PRN  - Uric acid 11, c/w allopurinol 300mg PO q12  - possibly 2/2 tumor burden (WBC 50k w/ NT predominance)  - allopurinol 300 BID   - BMP q48    #leukocytosis and tachycardia - SIRS positive  - suspected in setting of malignancy  - Bcx negative  - monitor off Abx  - BCR/ABL, JAK2, flow cytometry, CALR, MPL mutation all negative  - possible BM Bx OP    #Diarrhea, improving  - gi pcr and c. diff negative  - c/w on loperamide and simethicone     #Alk phos elevation   - RUQ US - cholelithiasis with ductal dilation   - trending 451> 457    # microcytic hypochromic anemia  - Last admission:   iron sat 10%, TIBC 314, Ferr 85 = could be iron def  - FeSO4 325mg po q24  - B12, Fol nl  - hapto 262  - MMA level normal 337  - retic 2.7%; t red 0.3  -  - this is NOT hemolysis, even intramedullary; possible marker for lymphoma or tumor burden    #abnormal LFTs  - RUQ US - GB stones w/out cholecystitis  - no current workup     # antiphospholipid syndrome  - no hx of clots; only 1 miscarriage  - on aspirin  - no a/c    # lupus - not in active flare  - c/w HCQ 200mg po q12   - c/w cymbalta    # HTN  #leg edema  #GOYO  - held home lisinopril 40mg oral daily in setting of GOYO - Cr. 1.5 w/ baseline ~0.8  - nifedipine adjusted to 30mg POD  - lasix 20mg q24hr    # Hypothyroidism  - c/w levothyroxine 150mg oral daily  - TSH 8.61 - f/u Free T4 and T3    #Misc  - DVT Prophylaxis: hep SQ TID   - Diet: DASH/TLC  - GI Prophylaxis: none  - Activity: bed bound  - IV Fluids: LR @75cc  - Code Status: FULL    Dispo: [facility]

## 2022-08-05 NOTE — CONSULT NOTE ADULT - SUBJECTIVE AND OBJECTIVE BOX
Gastroenterology Consultation:    Patient is a 57y old  Female who presents with a chief complaint of poor ambulation (02 Aug 2022 15:42)    Admitted on: 07-30-22    HPI:   58 y/o Female Pt  with a pmhx of lupus, hypothyroidism, antiphospholipid syndrome, HTN, anemia who recently dx with metastatic cancer , hypercalcemia, lymphoma , covid treated with paxlovid and morbidily obese. Presents to the ED with increased weakness and falling at home. no head injury or headaches. Patient was unable to lift herself off floor but was unable to get up. patient's   is unable to lift patient due to recent cataract surgery. In the ED, pt was noted to have an elevated WBC count of 49, increased from 28.77 from prior, GOYO noted, pt is covid positive however was recently rx with paxlovid. Pt was seen and examined at bedside, pt denies vomiting, diarrhea, abdominal pain or back pain. no sob, chest pain or weight loss. no rashes (30 Jul 2022 23:37)    Prior EGD:    Prior Colonoscopy:      PAST MEDICAL & SURGICAL HISTORY:  Lupus      Hypothyroidism      Iron deficiency anemia      No significant past surgical history            FAMILY HISTORY:  No pertinent family history in first degree relatives        Social History:  Tobacco:  Alcohol:  Drugs:    Home Medications:  aspirin 81 mg oral tablet, chewable: 1 tab(s) orally once a day (22 Jul 2022 13:38)  DULoxetine 60 mg oral delayed release capsule: 1 cap(s) orally once a day (14 Jul 2022 22:46)  hydroxychloroquine 200 mg oral tablet: 1 tab(s) orally 2 times a day (14 Jul 2022 22:44)  lisinopril 40 mg oral tablet: 1 tab(s) orally once a day (14 Jul 2022 22:44)  oxycodone-acetaminophen 5 mg-325 mg oral tablet: 1 tab(s) orally every 4 hours, As needed, Moderate Pain (4 - 6) (22 Jul 2022 13:38)  Synthroid 150 mcg (0.15 mg) oral tablet: 1 tab(s) orally once a day (14 Jul 2022 22:46)        MEDICATIONS  (STANDING):  allopurinol 300 milliGRAM(s) Oral two times a day  aspirin  chewable 81 milliGRAM(s) Oral daily  dextrose 5%. 1000 milliLiter(s) (100 mL/Hr) IV Continuous <Continuous>  dextrose 5%. 1000 milliLiter(s) (50 mL/Hr) IV Continuous <Continuous>  dextrose 50% Injectable 25 Gram(s) IV Push once  dextrose 50% Injectable 12.5 Gram(s) IV Push once  dextrose 50% Injectable 25 Gram(s) IV Push once  DULoxetine 60 milliGRAM(s) Oral daily  ferrous    sulfate 325 milliGRAM(s) Oral daily  furosemide    Tablet 20 milliGRAM(s) Oral daily  glucagon  Injectable 1 milliGRAM(s) IntraMuscular once  heparin   Injectable 5000 Unit(s) SubCutaneous three times a day  hydroxychloroquine 200 milliGRAM(s) Oral two times a day  levothyroxine 150 MICROGram(s) Oral daily  loperamide 2 milliGRAM(s) Oral every 4 hours  NIFEdipine XL 30 milliGRAM(s) Oral daily  nystatin Powder 1 Application(s) Topical two times a day  potassium chloride    Tablet ER 20 milliEquivalent(s) Oral every 2 hours  potassium phosphate / sodium phosphate Powder (PHOS-NaK) 1 Packet(s) Oral three times a day with meals  sodium bicarbonate 650 milliGRAM(s) Oral every 12 hours  sodium chloride 0.9%. 1000 milliLiter(s) (75 mL/Hr) IV Continuous <Continuous>    MEDICATIONS  (PRN):  dextrose Oral Gel 15 Gram(s) Oral once PRN Blood Glucose LESS THAN 70 milliGRAM(s)/deciliter  oxycodone    5 mG/acetaminophen 325 mG 1 Tablet(s) Oral every 4 hours PRN Moderate Pain (4 - 6)  simethicone 80 milliGRAM(s) Chew four times a day PRN Gas      Allergies  NSAIDs (Stomach Upset)      Review of Systems:   Constitutional:  No Fever, No Chills  ENT/Mouth:  No Hearing Changes,  No Difficulty Swallowing  Eyes:  No Eye Pain, No Vision Changes  Cardiovascular:  No Chest Pain, No Palpitations  Respiratory:  No Cough, No Dyspnea  Gastrointestinal:  As described in HPI  Musculoskeletal:  No Joint Swelling, No Back Pain  Skin:  No Skin Lesions, No Jaundice  Neuro:  No Syncope, No Dizziness  Heme/Lymph:  No Bruising, No Bleeding.          Physical Examination:  T(C): 35.7 (08-05-22 @ 13:23), Max: 36.9 (08-04-22 @ 14:29)  HR: 93 (08-05-22 @ 13:23) (91 - 119)  BP: 117/57 (08-05-22 @ 13:23) (102/54 - 127/59)  RR: 20 (08-05-22 @ 13:23) (18 - 20)  SpO2: --          GENERAL: AAOx3, no acute distress.  HEAD:  Atraumatic, Normocephalic  EYES: conjunctiva and sclera clear  NECK: Supple, no JVD or thyromegaly  CHEST/LUNG: Clear to auscultation bilaterally; No wheeze, rhonchi, or rales  HEART: Regular rate and rhythm; normal S1, S2, No murmurs.  ABDOMEN: Soft, nontender, nondistended; Bowel sounds present  NEUROLOGY: No asterixis or tremor.   SKIN: Intact, no jaundice        Data:                        8.6    57.25 )-----------( 366      ( 04 Aug 2022 08:48 )             26.2     Hgb Trend:  8.6  08-04-22 @ 08:48  9.1  08-03-22 @ 09:28      08-05    135  |  96<L>  |  26<H>  ----------------------------<  52<LL>  3.2<L>   |  19  |  1.2    Ca    7.6<L>      05 Aug 2022 08:19  Phos  1.7     08-05  Mg     2.0     08-05      Liver panel trend:  TBili 0.7   /   AST 50   /   ALT 25   /   AlkP 451   /   Tptn 5.7   /   Alb 2.8    /   DBili --      08-01  TBili 0.5   /   AST 58   /   ALT 26   /   AlkP 457   /   Tptn 5.9   /   Alb 3.0    /   DBili --      07-31  TBili 0.5   /   AST 73   /   ALT 27   /   AlkP 378   /   Tptn 6.4   /   Alb 3.4    /   DBili --      07-30              Radiology:       Gastroenterology Consultation:    Patient is a 57y old  Female who presents with a chief complaint of poor ambulation (02 Aug 2022 15:42)    Admitted on: 07-30-22    HPI:   56 y/o Female Pt  with a pmhx of lupus, hypothyroidism, antiphospholipid syndrome, HTN, anemia who recently dx with metastatic cancer , hypercalcemia, lymphoma , covid treated with paxlovid and morbidily obese. Presents to the ED with increased weakness and falling at home. Evaluated for pelvic mass with multiple lung nodules. IR biopsy   from one of the lung nodule showed   Prior EGD: never had EGD    Prior Colonoscopy: never had colonoscopy      PAST MEDICAL & SURGICAL HISTORY:  Lupus      Hypothyroidism      Iron deficiency anemia      No significant past surgical history            FAMILY HISTORY:  FH of colon cancer (uncle)        Social History:  no smoking or alcohol     Home Medications:  aspirin 81 mg oral tablet, chewable: 1 tab(s) orally once a day (22 Jul 2022 13:38)  DULoxetine 60 mg oral delayed release capsule: 1 cap(s) orally once a day (14 Jul 2022 22:46)  hydroxychloroquine 200 mg oral tablet: 1 tab(s) orally 2 times a day (14 Jul 2022 22:44)  lisinopril 40 mg oral tablet: 1 tab(s) orally once a day (14 Jul 2022 22:44)  oxycodone-acetaminophen 5 mg-325 mg oral tablet: 1 tab(s) orally every 4 hours, As needed, Moderate Pain (4 - 6) (22 Jul 2022 13:38)  Synthroid 150 mcg (0.15 mg) oral tablet: 1 tab(s) orally once a day (14 Jul 2022 22:46)        MEDICATIONS  (STANDING):  allopurinol 300 milliGRAM(s) Oral two times a day  aspirin  chewable 81 milliGRAM(s) Oral daily  dextrose 5%. 1000 milliLiter(s) (100 mL/Hr) IV Continuous <Continuous>  dextrose 5%. 1000 milliLiter(s) (50 mL/Hr) IV Continuous <Continuous>  dextrose 50% Injectable 25 Gram(s) IV Push once  dextrose 50% Injectable 12.5 Gram(s) IV Push once  dextrose 50% Injectable 25 Gram(s) IV Push once  DULoxetine 60 milliGRAM(s) Oral daily  ferrous    sulfate 325 milliGRAM(s) Oral daily  furosemide    Tablet 20 milliGRAM(s) Oral daily  glucagon  Injectable 1 milliGRAM(s) IntraMuscular once  heparin   Injectable 5000 Unit(s) SubCutaneous three times a day  hydroxychloroquine 200 milliGRAM(s) Oral two times a day  levothyroxine 150 MICROGram(s) Oral daily  loperamide 2 milliGRAM(s) Oral every 4 hours  NIFEdipine XL 30 milliGRAM(s) Oral daily  nystatin Powder 1 Application(s) Topical two times a day  potassium chloride    Tablet ER 20 milliEquivalent(s) Oral every 2 hours  potassium phosphate / sodium phosphate Powder (PHOS-NaK) 1 Packet(s) Oral three times a day with meals  sodium bicarbonate 650 milliGRAM(s) Oral every 12 hours  sodium chloride 0.9%. 1000 milliLiter(s) (75 mL/Hr) IV Continuous <Continuous>    MEDICATIONS  (PRN):  dextrose Oral Gel 15 Gram(s) Oral once PRN Blood Glucose LESS THAN 70 milliGRAM(s)/deciliter  oxycodone    5 mG/acetaminophen 325 mG 1 Tablet(s) Oral every 4 hours PRN Moderate Pain (4 - 6)  simethicone 80 milliGRAM(s) Chew four times a day PRN Gas      Allergies  NSAIDs (Stomach Upset)      Review of Systems:   Constitutional:  No Fever, No Chills  ENT/Mouth:  No Hearing Changes,  No Difficulty Swallowing  Eyes:  No Eye Pain, No Vision Changes  Cardiovascular:  No Chest Pain, No Palpitations  Respiratory:  No Cough, No Dyspnea  Gastrointestinal:  As described in HPI  Musculoskeletal:  No Joint Swelling, No Back Pain  Skin:  No Skin Lesions, No Jaundice  Neuro:  No Syncope, No Dizziness  Heme/Lymph:  No Bruising, No Bleeding.          Physical Examination:  T(C): 35.7 (08-05-22 @ 13:23), Max: 36.9 (08-04-22 @ 14:29)  HR: 93 (08-05-22 @ 13:23) (91 - 119)  BP: 117/57 (08-05-22 @ 13:23) (102/54 - 127/59)  RR: 20 (08-05-22 @ 13:23) (18 - 20)  SpO2: --      GENERAL: AAOx3, no acute distress.  HEAD:  Atraumatic, Normocephalic  EYES: conjunctiva and sclera clear  NECK: Supple, no JVD or thyromegaly  CHEST/LUNG: Clear to auscultation bilaterally; No wheeze, rhonchi, or rales  HEART: Regular rate and rhythm; normal S1, S2, No murmurs.  ABDOMEN: Soft, nontender, nondistended; Bowel sounds present  NEUROLOGY: No asterixis or tremor.   SKIN: Intact, no jaundice        Data:                        8.6    57.25 )-----------( 366      ( 04 Aug 2022 08:48 )             26.2     Hgb Trend:  8.6  08-04-22 @ 08:48  9.1  08-03-22 @ 09:28      08-05    135  |  96<L>  |  26<H>  ----------------------------<  52<LL>  3.2<L>   |  19  |  1.2    Ca    7.6<L>      05 Aug 2022 08:19  Phos  1.7     08-05  Mg     2.0     08-05      Liver panel trend:  TBili 0.7   /   AST 50   /   ALT 25   /   AlkP 451   /   Tptn 5.7   /   Alb 2.8    /   DBili --      08-01  TBili 0.5   /   AST 58   /   ALT 26   /   AlkP 457   /   Tptn 5.9   /   Alb 3.0    /   DBili --      07-31  TBili 0.5   /   AST 73   /   ALT 27   /   AlkP 378   /   Tptn 6.4   /   Alb 3.4    /   DBili --      07-30      Radiology:    < from: CT Abdomen and Pelvis w/ IV Cont (07.14.22 @ 18:24) >  Limited examination as described above.    Confluent hypodense lesions are visualized possibly involving the   bilateral adnexa (601/70-50), measuring up to 6.8 x 4.5 cm. Recommend   follow-up pelvic ultrasound.    Diffuse rounded para-aortic lymphadenopathy, measuring upto 1.7 cm in   short axis, compatible with neoplastic process versus less likely   infectious/inflammatory process. Recommend PET/CT and/or tissue sampling.    Multiple scattered bilateral solid pulmonary nodules, the largest of   which measures 8 mmin the right middle lobe.    Overall findings are suspicious for underlying metastatic ovarian   malignancy/lymphoma.    < end of copied text >  < from: CT Chest w/ IV Cont (07.15.22 @ 19:05) >    Multiple (at least 15) bilateral solid pulmonary nodules, measuring up to   1.7 cm at left upper lobe. Pulmonary metastases suspected.    < end of copied text >  < from: US Kidney and Bladder (08.01.22 @ 12:32) >  Normal renal ultrasound.    Pelvic mass with compression on the urinary bladder.    < end of copied text >

## 2022-08-05 NOTE — PROGRESS NOTE ADULT - SUBJECTIVE AND OBJECTIVE BOX
KELSIE JUAREZ  57y  Female  ***My note supersedes ALL resident notes that I sign.  My corrections for their notes are in my note.***    I can be reached directly on [id number]. My office number is [phone]. My personal cell number is [phone].    INTERVAL EVENTS: Here for f/u of cancer. Pt reports having diarrhea each day (about 5-6 yesterday and 3 today, so far). The episodes are small volume, but bothersome to her. She does not have much of an appetite (lack of smell and taste), but eats what she can. She denies tenesmus. Pain in abd is stable and controlled w/ pain meds. Pt trying to do exercises in bed to keep up str and mobility.    T(F): 96.8 (08-05-22 @ 05:09), Max: 98.4 (08-04-22 @ 14:29)  HR: 91 (08-05-22 @ 05:09) (91 - 119)  BP: 127/59 (08-05-22 @ 05:09) (102/54 - 127/59)  RR: 18 (08-05-22 @ 05:09) (18 - 18)  SpO2: --    Gen: NAD  HEENT: PERRL, EOMI, mouth clr, nose clr  Neck: no nodes, no JVD, thyroid nl  lungs: clr  hrt: s1 s2 reg, tachy (HR 90 on my exam), no murmur  abd: soft, obese, ND, mildly tender lower abd; no HS megaly; hard to eval for mass given body habitus  ext: 1+-2+ edema, no c/c  neuro: aa ox3, cn intact, can move all 4 ext, but legs very weak    LABS:                      8.6     (    78.0   57.25 )-----------( ---------      366      ( 04 Aug 2022 08:48 )             26.2    (    22.2     WBC Count: 57.25 K/uL (08-04-22 @ 08:48)  WBC Count: 59.83 K/uL (08-03-22 @ 09:28)  WBC Count: 60.32 K/uL (08-02-22 @ 07:06)  WBC Count: 54.31 K/uL (08-01-22 @ 06:38)  WBC Count: 51.95 K/uL (07-31-22 @ 18:40)    Hemoglobin: 8.6 g/dL (08-04 @ 08:48)  Hemoglobin: 9.1 g/dL (08-03 @ 09:28)  Hemoglobin: 9.5 g/dL (08-02 @ 07:06)  Hemoglobin: 9.4 g/dL (08-01 @ 06:38)  Hemoglobin: 9.9 g/dL (07-31 @ 18:40)    135   (   96   (   52      08-05-22 @ 08:19  ----------------------               3.2   (   19   (   26                             -----                        1.2    Ca  7.6 = deborah = 8.6  Mg  2.0    P   1.7     Alb 2.8  T red 0.7     AST 50  ALT 25  08-01-22 @ 06:38  Alb 3.0  T red 0.5     AST 58  ALT 26  07-31-22 @ 18:40    CAPILLARY BLOOD GLUCOSE  POCT Blood Glucose.: 84 (08-05-22 @ 10:22)  POCT Blood Glucose.: 104 (08-04-22 @ 12:22)  POCT Blood Glucose.: 96 (08-04-22 @ 08:40)  POCT Blood Glucose.: 94 (08-03-22 @ 21:25)  POCT Blood Glucose.: 99 (08-03-22 @ 17:39)  POCT Blood Glucose.: 102 (08-03-22 @ 11:50)  POCT Blood Glucose.: 104 (08-03-22 @ 08:19)  POCT Blood Glucose.: 108 (08-02-22 @ 21:38)    Culture - Blood (collected 08-02-22 @ 12:13)  Source: .Blood None  Preliminary Report (08-03-22 @ 23:01):    No growth to date.    Culture - Blood (collected 08-01-22 @ 06:38)  Source: .Blood None  Preliminary Report (08-02-22 @ 18:02):    No growth to date.    RADIOLOGY & ADDITIONAL TESTS:    MEDICATIONS:  hydroxychloroquine 200 milliGRAM(s) Oral two times a day    allopurinol 300 milliGRAM(s) Oral two times a day  aluminum hydroxide/magnesium hydroxide/simethicone Suspension 30 milliLiter(s) Oral every 4 hours PRN  aspirin  chewable 81 milliGRAM(s) Oral daily  DULoxetine 60 milliGRAM(s) Oral daily  ferrous    sulfate 325 milliGRAM(s) Oral daily  furosemide    Tablet 20 milliGRAM(s) Oral daily  heparin   Injectable 5000 Unit(s) SubCutaneous three times a day  levothyroxine 150 MICROGram(s) Oral daily  loperamide 2 milliGRAM(s) Oral every 4 hours  NIFEdipine XL 30 milliGRAM(s) Oral daily  nystatin Powder 1 Application(s) Topical two times a day  ondansetron    Tablet 4 milliGRAM(s) Oral every 6 hours PRN  oxycodone    5 mG/acetaminophen 325 mG 1 Tablet(s) Oral every 4 hours PRN  potassium phosphate / sodium phosphate Powder (PHOS-NaK) 1 Packet(s) Oral three times a day with meals  simethicone 80 milliGRAM(s) Chew four times a day PRN  sodium chloride 0.9%. 1000 milliLiter(s) IV Continuous <Continuous>

## 2022-08-05 NOTE — PROGRESS NOTE ADULT - ASSESSMENT
Again reiterated to the patient the very atypical presentation she is exhibiting    For a presumable Mullerian adenocarcinoma the disease distribution is very unusual - no ascites, lung parenchymal disease and normal ca125 and mildly elevated CEA    Recommendations:    Repeat imaging, either CT with PO and IV contrast or MRI+   2nd opinion on the lung Bx to assure NAC is planned correctly  PT to improve functional status  GI evaluation for marginally elevated CEA to be considered

## 2022-08-05 NOTE — CONSULT NOTE ADULT - ASSESSMENT
58 y/o F with PMHx of lupus, hypothyroidism, antiphospholipid syndrome, HTN, and anemia who presented to the ER complaining of diffuse pelvic pain and back pain x 3 weeks associated with decreased appetite and vomiting over the last 8 days. evaluated for pelvic mass with multiple lung nodules. GI consulted to evaluate for possible GI tract primary.     # Adeno-Carcinoma with unclear primary site.   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 The possible primary site include ovary, uterus or the pritoneum.  - CT A/P: bilateral adnexal hypodensities para-aortic lymphadenopathy as well as bilateral pulm nodules.  - Never had colonoscopy or colonoscopy.   - FH of colon cancer at age 45 (uncle)  - CEA 5.5  - reviewed labs and imaging   - Iron profile reviewed   Plan:   - will plan for EGD/colonoscopy   - please correct electrolytes     # Diarrhea   - GI PCR negative   - C. Diff PCR negative   - started  imodium     plan   -    56 y/o F with PMHx of lupus, hypothyroidism, antiphospholipid syndrome, HTN, and anemia who presented to the ER complaining of diffuse pelvic pain and back pain x 3 weeks associated with decreased appetite and vomiting over the last 8 days. evaluated for pelvic mass with multiple lung nodules. GI consulted to evaluate for possible GI tract primary.     # Adeno-Carcinoma with unclear primary site.   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 The possible primary site include ovary, uterus or the pritoneum.  - CT A/P: bilateral adnexal hypodensities para-aortic lymphadenopathy as well as bilateral pulm nodules.  - Never had colonoscopy or colonoscopy.   - FH of colon cancer at age 45 (uncle)  - CEA 5.5  - reviewed labs and imaging   - Iron profile reviewed   Plan:   - will plan for EGD/colonoscopy next week   - please correct electrolytes     # Diarrhea   - GI PCR negative   - C. Diff PCR negative   - started  imodium     plan   -    56 y/o F with PMHx of lupus, hypothyroidism, antiphospholipid syndrome, HTN, and anemia who presented to the ER complaining of diffuse pelvic pain and back pain x 3 weeks associated with decreased appetite and vomiting over the last 8 days. evaluated for pelvic mass with multiple lung nodules. GI consulted to evaluate for possible GI tract primary.     # Adeno-Carcinoma with unclear primary site.   # Chronic anemia, Microcytic    # Diarrhea, cancer related?     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 The possible primary site include ovary, uterus or the pritoneum.  - CT A/P: bilateral adnexal hypodensities para-aortic lymphadenopathy as well as bilateral pulm nodules.  - Never had colonoscopy or colonoscopy.   - FH of colon cancer at age 45 (uncle)  - CEA 5.5  - reviewed labs and imaging   - Iron profile reviewed   - the incidence of isolated lung metastasis without liver lesions is low in CRC. but remains possible.   - GI PCR negative   - C. Diff PCR negative   - GI/GYN tumors can cause diarrhea   - started  imodium by the team, improving.    Plan:   - Will plan for EGD/colonoscopy next week if primary source remains unclear.  - please correct electrolytes   - hematology for further workup to explain leukocytosis.      58 y/o F with PMHx of lupus, hypothyroidism, antiphospholipid syndrome, HTN, and anemia who presented to the ER complaining of diffuse pelvic pain and back pain x 3 weeks associated with decreased appetite and vomiting over the last 8 days. evaluated for pelvic mass with multiple lung nodules. GI consulted to evaluate for possible GI tract primary.     # AdenoCarcinoma with unclear primary.  # Chronic anemia, Microcytic    # Diarrhea, cancer related?     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 The possible primary site include ovary, uterus or the pritoneum.  - CT A/P: bilateral adnexal hypodensities para-aortic lymphadenopathy as well as bilateral pulm nodules.  - Never had colonoscopy or colonoscopy.   - FH of colon cancer at age 45 (uncle)  - CEA 5.5  - reviewed labs and imaging   - Iron profile reviewed   - the incidence of isolated lung metastasis without liver lesions is low in CRC. but remains possible.   - GI PCR negative   - C. Diff PCR negative   - GI/GYN tumors can cause diarrhea   - started  imodium by the team, improving.    Plan:   - Will plan for EGD/colonoscopy next week if primary source remains unclear.  - please correct electrolytes   - hematology for further workup to explain leukocytosis.

## 2022-08-05 NOTE — PROGRESS NOTE ADULT - ASSESSMENT
# 56 y/o F with PMHx of lupus, hypothyroidism, antiphospholipid syndrome, HTN, and anemia complaining of pelvic and back pain for 3 weeks with decreased appetite and vomiting for 8 days admitted for progressive generalized weakness and fall.     #Generalized weakness  #Fall  #covid+ prior admission  - h/o general weakness from previous covid infections, reports sxs improve about 1-2weeks after recovery  - 1st covid +ve 7/21 needs 21 days of isolation to 8/11  - s/p paxlovid outpatient  - bed bound pt states too weak to remain standing  - orthostatics -ve  - , -ve  - c/w PT possible d/c to STR    #confirmed malignancy (AdenoCA): b/l adnexal lesions; paraaortic LN; 15+ pulm nodules;   #leukocytosis with neutrophilic predominance  - CT Chest: 15+ pulm nod up to 1.7cm -s/p IR bx of lung nod 7/21. 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  - Pending final path report and IHC  - CT A/P: bilateral adnexal hypodensities, para-aortic lymphadenopathy as well as bilateral pulm nodules  - TVUS: Rt 6.6cm mixed solid-cystic structure; Lt 5.6 cm heterog, solid structure w/ doppler flow   - : 33 (nl); CA 19-9: 19 (nl); CEA 5.5; ; HCG < 0.6; S preg neg; ESR 80; uric acid 6.7 Inhibin A: neg; B: slightly elevated   - Heme/onc and gyn/onc following   - Pt unable to fit in MRI machine and difficult to obtain mammogram due to patients inability to stand  - CT head, abdomen, pelvis w/ IV and oral contrast, pt on LR @75cc to improve GOYO   - f/u CA 15-3 and CA 27-29   - GI following for diarrhea, will evaluate for possible GI tumor, plan for EGD/colonoscopy  - c/w Dilaudid 2mg po q6 prn. Monitor BMs     #GOYO/HAGMA  #Hypokalemia  #Hypophosphatemia  #Hypercalcemia  - K-dur 20meq po Q2hrs x2  - suspected PTHrP and mulnutrition w/ refeeding syndrome  - Na-Phos 30mmol over 6hs IV 1x today  - Neutraphos 1packet TID w/ meals  - Phos q24 until >2 last 1.7 repeat in AM  - iPTH <20, PTHrP 11  - s/p pamidronate 90mg 1x previous admission  - zofran 4mg PO q6hr PRN  - Uric acid 11, c/w allopurinol 300mg PO q12  - possibly 2/2 tumor burden (WBC 50k w/ NT predominance)  - allopurinol 300 BID   - BMP q48hr monitor for K, phos    #leukocytosis and tachycardia - SIRS positive  - suspected in setting of malignancy  - Bcx negative  - monitor off Abx  - BCR/ABL, JAK2, flow cytometry, CALR, MPL mutation all negative  - possible BM Bx OP    #Diarrhea, improving  - gi pcr and c. diff negative  - c/w on loperamide and simethicone     #Alk phos elevation   - RUQ US - cholelithiasis with ductal dilation   - trending 451> 457    # microcytic hypochromic anemia  - Last admission:   iron sat 10%, TIBC 314, Ferr 85 = could be iron def  - FeSO4 325mg po q24  - B12, Fol nl  - hapto 262  - MMA level normal 337  - retic 2.7%; t red 0.3  -  - this is NOT hemolysis, even intramedullary; possible marker for lymphoma or tumor burden    #abnormal LFTs  - RUQ US - GB stones w/out cholecystitis  - no current workup     # antiphospholipid syndrome  - no hx of clots; only 1 miscarriage  - on aspirin  - no a/c    # lupus - not in active flare  - c/w HCQ 200mg po q12   - c/w cymbalta    # HTN  #leg edema  #GOYO  - held home lisinopril 40mg oral daily in setting of GOYO - Cr. 1.5 w/ baseline ~0.8  - nifedipine adjusted to 30mg POD  - lasix 20mg q24hr    # Hypothyroidism  - c/w levothyroxine 150mg oral daily  - TSH 8.61 - f/u Free T4 nl and low T3 - suspected euthyroid sick syndrome    #Misc  - DVT Prophylaxis: hep SQ TID   - Diet: DASH/TLC  - GI Prophylaxis: none  - Activity: bed bound  - IV Fluids: LR @75cc  - Code Status: FULL    Dispo: [facility]

## 2022-08-05 NOTE — PROGRESS NOTE ADULT - ASSESSMENT
Pt is a 56 y/o F with PMHx of lupus, hypothyroidism, antiphospholipid syndrome, HTN, and anemia who presented to the ER complaining of diffuse pelvic pain and back pain x 3 weeks associated with decreased appetite and vomiting over the last 8 days.    # COVID + on last admit  was exposed 7/16 to another pt in hosp w/ COVID;  also had COVID  1st COVID + 7/21 -> 21 days of isolation -> to 8/11  pt s/p Paxlovid as outpt recently  seems asymp, except altered taste and smell  NOT on O2   this is NOT why WBC high    # diarrhea - not sure why - poss irritation from tumor vs other; metab acidosis  C Diff neg; GI pcr neg  make imodium 2mg po q4 for now (not prn)  NaHCO3 650mg po q12  GI eval    # leg edema; HTN  lisinopril off (GOYO)  lasix 20mg po q24  c/w Nifed XL 30mg po q24 - will d/c this if lasix makes BP too low    # hypokalemia  K-dur 20meq po q2hrs x 2 doses  might worsen w/ combo of lasix and diarrhea - will watch for this  f/u BMP    # hypophosphatemia  prob effect of PTHRP and malnutrition w/ refeeding syndrome  c/w neutraphos 1 pkt po 3x/day w/ meals  rpt phos q24 til >2    # hypercalcemia - much better now  iPTH < 20 -> hyperpara RULED OUT  likely malign in origin - PTHrP 11 (nl <2) and + adenoCa on bx (PTHrP is secreted by many tumors - ie, not specific)  s/p Pamidronate 90mg iv x1 last admit  25 OH Vit D 74 (upper limit of nl)    # confirmed malignancy (adenoCa of Mullerian Origin?): b/l adnexal lesions (primary vs metastatic ([place])); paraaortic LN; 15+ pulm nodules;   Primary Site: hard to say; distribution of path, symptoms, tumor markers do NOT lead to a clear primary site  GI eval: to eval need for upper/lower endo  CT C: 15+ pulm nod up to 1.7cm - IR s/p bx of lung nod 7/21  f/u path: + adenoCa, Mullerian Origin (?)  CT A/P: bilateral adnexal hypodensities, para-aortic lymphadenopathy as well as bilateral pulm nodules  TVUS: Rt 6.6 cm mixed solid-cystic structure; Lt 5.6 cm heterog, solid structure w/ doppler flow   H/O f/u   Gyn/Onc f/u  : 33 (nl); CA 19-9: 19 (nl); CEA 5.5; ; HCG < 0.6; S preg neg; ESR 80;  uric acid 6.7 -> 11.5 (prob cell turnover) - on allopurinol 300mg po q12 (f/u h/o)  Inhibin A: neg; B: slightly elevated   f/u CA 15-3 and CA 27-29  spoke w/ MRI, pt does NOT fit into MRI gantry, thus cannot do MRI-B nor MRI pelvis  obtain CT Head w/ IV contrast (r/o brain mets)  GYN/ONC req: obtain rpt CT A/P w/ oral and IV cont  IVFs: NS 75/hr in prep for contrast  will hold off on mammogram (logistically too hard to obtain in obese pt who cannot stand and machine in another building)  percocet 5/325mg po q4 prn neopl-related pain     # marked leukocytosis and tachycardia = SIRS 2/2 malign  prob reactive   bld cx all neg   no abx for now  BCR/ABL neg; JAK2 neg; flow cyto neg; CALR neg; MPL mut analysis neg   cbc NOT needed daily    # microcytic anemia  prob of malign  iron sat 10%, TIBC 314, Ferr 85 = could be iron def  no IV iron for now given SIRS  FeSO4 325mg po q24  GI eval  B12, Fol nl  hapto 262   (upper nl)  retic 2.7%; t red 0.3   - this is NOT hemolysis, even intramedullary; I suspect this is a marker for malign or cell turnover    # minor GOYO - prob dehydration;  Renal U/S: no hydro  IVFs in prep for contrast  BMP q48    # prior thrombocytosis - now better    # abnl LFTs mild (incr AP and slight incr AST)  prob related to underlying malign  RUQ U/S: GB stones w/out cholecystitis  no further w/u for now  no need to follow LFTs    # lupus - does not seem active currently  c/w HCQ 200mg po q12  c/w cymbalta    # antiphospholipid syndrome  prob just element of lupus hx  no clots; only 1 miscarriage  cont asa   no a/c    # Hypothyroidism  c/w levothyroxine 150mg oral daily  TSH 8.61 - would NOT raise dose given tachycardia  free T4 nl and free T3 low (prob sick euthyroid)    # no hx of DM  d/c all FS - they are always nl    # DVT ppx: hep 5000 sc q8    # GI ppx: none    # Activity: used to walk and cannot now; s/p fall at home; PT eval    # Code: full    Dispo: GI eval; f/u H/O and G/O; CT H w/ IV cont; CT A/P w/ oral/iv cont; f/u breast tumor markers; d/c FS; tx phos; tx pain; tx diarrhea; check FT4/FT3; start lasix  eventually, pt will need STR, either 4A or SNF vs home - not ready for d/c yet    Prog is very guarded.

## 2022-08-05 NOTE — CONSULT NOTE ADULT - ATTENDING COMMENTS
Concerns for adenocarcinoma from a GI source. Unexplained leukocytosis. Would consider endoscopic evaluation (although atypical given unremarkable liver). Would consult with hematology about possible etiologies behind the leukocytosis. Planing for endoscopy for next week

## 2022-08-05 NOTE — PROGRESS NOTE ADULT - SUBJECTIVE AND OBJECTIVE BOX
Patient seen and examined today    No changes in her overall condition - appears better than her laboratory results suggest

## 2022-08-05 NOTE — PROGRESS NOTE ADULT - SUBJECTIVE AND OBJECTIVE BOX
KELSIE JUAREZ 57y Female  MRN#: [id number]   Hospital Day: 6d    SUBJECTIVE  Patient is a 57y old Female who presents with a chief complaint of poor ambulation (02 Aug 2022 15:42)  Currently admitted to medicine with the primary diagnosis of GOYO (acute kidney injury)      INTERVAL HPI AND OVERNIGHT EVENTS:  Patient was examined and seen at bedside. This morning she is uncomfortable in bed, complaining of persistent diarrhea. She endorses the diarrhea to occur throughout the night even as she sleeps. This morning she had some abdominal discomfort which she states precedes a BM. Patient also reports not having an appetite. Tolerating water for now.  No other events overnight, vital signs stable.        OBJECTIVE  PAST MEDICAL & SURGICAL HISTORY  Lupus    Hypothyroidism    Iron deficiency anemia    No significant past surgical history      ALLERGIES:  NSAIDs (Stomach Upset)    MEDICATIONS:  STANDING MEDICATIONS  allopurinol 300 milliGRAM(s) Oral two times a day  aspirin  chewable 81 milliGRAM(s) Oral daily  dextrose 5%. 1000 milliLiter(s) IV Continuous <Continuous>  dextrose 5%. 1000 milliLiter(s) IV Continuous <Continuous>  dextrose 50% Injectable 25 Gram(s) IV Push once  dextrose 50% Injectable 12.5 Gram(s) IV Push once  dextrose 50% Injectable 25 Gram(s) IV Push once  DULoxetine 60 milliGRAM(s) Oral daily  ferrous    sulfate 325 milliGRAM(s) Oral daily  furosemide    Tablet 20 milliGRAM(s) Oral daily  glucagon  Injectable 1 milliGRAM(s) IntraMuscular once  heparin   Injectable 5000 Unit(s) SubCutaneous three times a day  hydroxychloroquine 200 milliGRAM(s) Oral two times a day  levothyroxine 150 MICROGram(s) Oral daily  loperamide 2 milliGRAM(s) Oral every 4 hours  NIFEdipine XL 30 milliGRAM(s) Oral daily  nystatin Powder 1 Application(s) Topical two times a day  potassium phosphate / sodium phosphate Powder (PHOS-NaK) 1 Packet(s) Oral three times a day with meals  sodium chloride 0.9%. 1000 milliLiter(s) IV Continuous <Continuous>    PRN MEDICATIONS  aluminum hydroxide/magnesium hydroxide/simethicone Suspension 30 milliLiter(s) Oral every 4 hours PRN  dextrose Oral Gel 15 Gram(s) Oral once PRN  ondansetron    Tablet 4 milliGRAM(s) Oral every 6 hours PRN  oxycodone    5 mG/acetaminophen 325 mG 1 Tablet(s) Oral every 4 hours PRN  simethicone 80 milliGRAM(s) Chew four times a day PRN      VITAL SIGNS: Last 24 Hours  T(C): 36 (05 Aug 2022 05:09), Max: 36.9 (04 Aug 2022 14:29)  T(F): 96.8 (05 Aug 2022 05:09), Max: 98.4 (04 Aug 2022 14:29)  HR: 91 (05 Aug 2022 05:09) (91 - 119)  BP: 127/59 (05 Aug 2022 05:09) (102/54 - 127/59)  BP(mean): --  RR: 18 (05 Aug 2022 05:09) (18 - 18)  SpO2: --    LABS:                        8.6    57.25 )-----------( 366      ( 04 Aug 2022 08:48 )             26.2     08-04    133<L>  |  96<L>  |  27<H>  ----------------------------<  79  3.7   |  22  |  1.5    Ca    7.8<L>      04 Aug 2022 08:48  Phos  0.9     08-04  Mg     2.1     08-04                Culture - Blood (collected 02 Aug 2022 12:13)  Source: .Blood None  Preliminary Report (03 Aug 2022 23:01):    No growth to date.          RADIOLOGY:      PHYSICAL EXAM:  CONSTITUTIONAL: No acute distress, well-developed, well-groomed, AAOx3  HEAD: Atraumatic, normocephalic  EYES: EOM intact, PERRLA, conjunctiva and sclera clear  ENT: Supple, no masses, no thyromegaly, no bruits, no JVD; moist mucous membranes  PULMONARY: Clear to auscultation bilaterally; no wheezes, rales, or rhonchi  CARDIOVASCULAR: Regular rate and rhythm; no murmurs, rubs, or gallops  GASTROINTESTINAL: Soft, non-tender, non-distended; bowel sounds present  MUSCULOSKELETAL: 2+ peripheral pulses; no clubbing, no cyanosis, no edema  NEUROLOGY: non-focal  SKIN: No rashes or lesions; warm and dry   KELSIE JUAREZ 57y Female  MRN#: [id number]   Hospital Day: 6d    SUBJECTIVE  Patient is a 57y old Female who presents with a chief complaint of poor ambulation (02 Aug 2022 15:42)  Currently admitted to medicine with the primary diagnosis of GOYO (acute kidney injury)      INTERVAL HPI AND OVERNIGHT EVENTS:  Patient was examined and seen at bedside. This morning she is uncomfortable in bed, complaining of persistent diarrhea. She endorses the diarrhea to occur throughout the night even as she sleeps. This morning she had some abdominal discomfort which she states precedes a BM. Patient also reports not having an appetite. Tolerating water for now.  No other events overnight, vital signs stable.        OBJECTIVE  PAST MEDICAL & SURGICAL HISTORY  Lupus    Hypothyroidism    Iron deficiency anemia    No significant past surgical history      ALLERGIES:  NSAIDs (Stomach Upset)    MEDICATIONS:  STANDING MEDICATIONS  allopurinol 300 milliGRAM(s) Oral two times a day  aspirin  chewable 81 milliGRAM(s) Oral daily  dextrose 5%. 1000 milliLiter(s) IV Continuous <Continuous>  dextrose 5%. 1000 milliLiter(s) IV Continuous <Continuous>  dextrose 50% Injectable 25 Gram(s) IV Push once  dextrose 50% Injectable 12.5 Gram(s) IV Push once  dextrose 50% Injectable 25 Gram(s) IV Push once  DULoxetine 60 milliGRAM(s) Oral daily  ferrous    sulfate 325 milliGRAM(s) Oral daily  furosemide    Tablet 20 milliGRAM(s) Oral daily  glucagon  Injectable 1 milliGRAM(s) IntraMuscular once  heparin   Injectable 5000 Unit(s) SubCutaneous three times a day  hydroxychloroquine 200 milliGRAM(s) Oral two times a day  levothyroxine 150 MICROGram(s) Oral daily  loperamide 2 milliGRAM(s) Oral every 4 hours  NIFEdipine XL 30 milliGRAM(s) Oral daily  nystatin Powder 1 Application(s) Topical two times a day  potassium phosphate / sodium phosphate Powder (PHOS-NaK) 1 Packet(s) Oral three times a day with meals  sodium chloride 0.9%. 1000 milliLiter(s) IV Continuous <Continuous>    PRN MEDICATIONS  aluminum hydroxide/magnesium hydroxide/simethicone Suspension 30 milliLiter(s) Oral every 4 hours PRN  dextrose Oral Gel 15 Gram(s) Oral once PRN  ondansetron    Tablet 4 milliGRAM(s) Oral every 6 hours PRN  oxycodone    5 mG/acetaminophen 325 mG 1 Tablet(s) Oral every 4 hours PRN  simethicone 80 milliGRAM(s) Chew four times a day PRN      VITAL SIGNS: Last 24 Hours  T(C): 36 (05 Aug 2022 05:09), Max: 36.9 (04 Aug 2022 14:29)  T(F): 96.8 (05 Aug 2022 05:09), Max: 98.4 (04 Aug 2022 14:29)  HR: 91 (05 Aug 2022 05:09) (91 - 119)  BP: 127/59 (05 Aug 2022 05:09) (102/54 - 127/59)  BP(mean): --  RR: 18 (05 Aug 2022 05:09) (18 - 18)  SpO2: --    LABS:                        8.6    57.25 )-----------( 366      ( 04 Aug 2022 08:48 )             26.2     08-04    133<L>  |  96<L>  |  27<H>  ----------------------------<  79  3.7   |  22  |  1.5    Ca    7.8<L>      04 Aug 2022 08:48  Phos  0.9     08-04  Mg     2.1     08-04                Culture - Blood (collected 02 Aug 2022 12:13)  Source: .Blood None  Preliminary Report (03 Aug 2022 23:01):    No growth to date.          RADIOLOGY:  RU Q US 8/1/22: Cholelithiasis without ductal dilation   US KB 8/1/22: Normal renal ultrasound, pelvic mass w/ compression on urinary bladder  Venous LE duplex 8/1: No evidence of deep venous thrombosis in either lower extremity. Bilateral peroneal veins are not visualized  CT chest w/ IV con 7/15: Right pelvic 6.6 cm heterogeneous mixed solid-cystic structure; may   represent ovarian mass. Lower pelvic 5.6 cm heterogeneous, solid appearing structure without   appreciable Doppler flow; may represent additional ovarian mass or degenerating fibroid.    PHYSICAL EXAM:  CONSTITUTIONAL: No acute distress, obese, well-groomed, cooperative, AAOx3  PULMONARY: Clear to auscultation bilaterally; no wheezes, rales, or rhonchi  CARDIOVASCULAR: Regular rate and rhythm; no murmurs, rubs, or gallops  GASTROINTESTINAL: Soft, rounded obese abdomen, bowel sounds present, non tender, red dry rash present under skin folds  MUSCULOSKELETAL: 3+ pitting edema b/l, distal pulses unable to palpate, no clubbing/cyanosis

## 2022-08-06 NOTE — PROGRESS NOTE ADULT - ASSESSMENT
Patient is a 58 y/o F with a pmhx of lupus, hypothyroidism, antiphospholipid syndrome, HTN, anemia,  recently diagnosed  with  b/l adnexal masses and lung nodules, unknown primary disease (7/2022), presented with weakness and falls and admitted.      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  #clinical corelation to r/o possibility of Breast cancer is recommended.   #Bilateral adnexal masses with para-aortic lymphadenopathy and pulmonary nodules suggestive of metastatic disease.  # Biopsy of Left upper lobe Lung nodule- Adenocarcinoma of mullerian origin.  PDL-1 and NGS pending.    -Ca 19-9, Ca125 normal  -CEA 5.5  -US Transvaginal : right 6.6 cm heterogeneous mixed solid-cystic mass and lower pelvic 5.6 cm heterogenous solid appearing may represent ovarian mass or degenerating fibroid.  -CT A/P W contrast 7/2022: B/l confluent hypodensities in b/l adnexae measuring 6.8 cm x 4.5 cm, paraaortic lymphadenopathy and b/l solid pulmonary nodules largest measuring 8mm.  -Pathology of Left upper lobe lung nodule :  Adenocarcinoma of mullerian origin adenocarcinoma of Mullerian origin, morphologically consistent with endometrioid adenocarcinoma. The possible primary site include ovary, uterus or the peritoneum. While GATA3 staining can be seen in small percentage of endometrioid carcinoma, While GATA3 stainining can be seen in small percentage of endometrioid carcinoma, clinical correlation to rule out the possibility of breast carcinoma is recommended.    -PDL1 and NGS pending.  -Although the primary may be ovarian, however breast or other etiologies should be considered given the normal Ca 125. Apparently, the breast examination has been negative.  -MRI of head w contrast : unable to be obtained as pt is too big.  -CT head with contrast ordered.  -OBGYN recs repeat imaging of CT A/P with contrast and pathology for second opinion of pathology specimen.  -OBGYN recs no surgical intervention for now.  -GI was consulted due to elevated CEA.     # Leukocytosis and thrombocytosis likely reactive secondary to underlying malignancy  - peripheral flow Normal.  - BCR-ABL, Jak2, CALR, MPL Negative for any mutations.    #Microcytic anemia:  -Received 5 doses of Venofer for Iron deficiency anemia in 7/2022 during prior admission.    Recommendations.  -Please obtain diagnostic mammogram of B/L breasts if feasible.  -will start on chemotherapy with carboplatin and taxol cycle 1 tomorrow or on monday.  -Pt was clearly explained the benefits and risks of chemotherapy.  -Pt agreeable to start chemotherapy.   -Further recommendations for treatment after the above issues clarified. Patient is a 56 y/o F with a pmhx of lupus, hypothyroidism, antiphospholipid syndrome, HTN, anemia,  recently diagnosed  with  b/l adnexal masses and lung nodules, unknown primary disease (7/2022), presented with weakness and falls and admitted.      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  #clinical corelation to r/o possibility of Breast cancer is recommended.   #Bilateral adnexal masses with para-aortic lymphadenopathy and pulmonary nodules suggestive of metastatic disease.  # Biopsy of Left upper lobe Lung nodule- Adenocarcinoma of mullerian origin.  PDL-1 and NGS pending.    -Ca 19-9, Ca125 normal  -CEA 5.5  -CA 15.3 and CA 27.29 pending.  -US Transvaginal : right 6.6 cm heterogeneous mixed solid-cystic mass and lower pelvic 5.6 cm heterogenous solid appearing may represent ovarian mass or degenerating fibroid.  -CT A/P W contrast 7/2022: B/l confluent hypodensities in b/l adnexae measuring 6.8 cm x 4.5 cm, paraaortic lymphadenopathy and b/l solid pulmonary nodules largest measuring 8mm.  -Pathology of Left upper lobe lung nodule :  Adenocarcinoma of mullerian origin adenocarcinoma of Mullerian origin, morphologically consistent with endometrioid adenocarcinoma. The possible primary site include ovary, uterus or the peritoneum. While GATA3 staining can be seen in small percentage of endometrioid carcinoma, While GATA3 stainining can be seen in small percentage of endometrioid carcinoma, clinical correlation to rule out the possibility of breast carcinoma is recommended.    -PDL1 and NGS pending.  -Although the primary may be ovarian, however breast or other etiologies should be considered given the normal Ca 125. Apparently, the breast examination has been negative.  -MRI of head w contrast : unable to be obtained as pt is too big.  -CT head with contrast ordered.  -OBGYN recs repeat imaging of CT A/P with contrast and pathology for second opinion of pathology specimen.  -OBGYN recs no surgical intervention for now.  -GI was consulted due to elevated CEA.     # Leukocytosis and thrombocytosis likely reactive secondary to underlying malignancy  - peripheral flow Normal.  - BCR-ABL, Jak2, CALR, MPL Negative for any mutations.    #Microcytic anemia:  -Received 5 doses of Venofer for Iron deficiency anemia in 7/2022 during prior admission.    Recommendations.  -Please obtain diagnostic mammogram of B/L breasts if feasible.  -will start on chemotherapy with carboplatin and taxol cycle 1 tomorrow or on monday.  -Pt was clearly explained the benefits and risks of chemotherapy.  -Pt agreeable to start chemotherapy.     -The side effects from chemotherapy have been discussed including but not limited to :Pain, hyponatremia, hypomagnesemia, hypocalcemia, hypokalemia, vomiting, abdominal pain, nausea and vomiting, bone marrow depression, anemia, leukopenia, neutropenia, thrombocytopenia, increased serum alkaline phosphatase, increased serum AST, hypersensitivity, weakness, decreased creatinine clearance, increased blood urea nitrogen; flushing, ECG abnormality, edema, hypotension, alopecia, skin rash, diarrhea, stomatitis, hemorrhage, hypersensitivity reaction, infection, injection site reaction, peripheral neuropathy, arthralgia, myalgia, asthenia, fever.    The chemotherapy dose was adjusted according to pt's height, weight, labs and anticipated tolerance.  The high risk of complications and complexity associated with antineoplastic therapy administration has been explained to the pt.    -Further recommendations for treatment after the above issues clarified. Patient is a 58 y/o F with a pmhx of lupus, hypothyroidism, antiphospholipid syndrome, HTN, anemia,  recently diagnosed  with  b/l adnexal masses and lung nodules, unknown primary disease (7/2022), presented with weakness and falls and admitted.      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  #clinical corelation to r/o possibility of Breast cancer is recommended.   #Bilateral adnexal masses with para-aortic lymphadenopathy and pulmonary nodules suggestive of metastatic disease.  # Biopsy of Left upper lobe Lung nodule- Adenocarcinoma of mullerian origin.  PDL-1 and NGS pending.    -Ca 19-9, Ca125 normal  -CEA 5.5  -CA 15.3 and CA 27.29 pending.  -US Transvaginal : right 6.6 cm heterogeneous mixed solid-cystic mass and lower pelvic 5.6 cm heterogenous solid appearing may represent ovarian mass or degenerating fibroid.  -CT A/P W contrast 7/2022: B/l confluent hypodensities in b/l adnexae measuring 6.8 cm x 4.5 cm, paraaortic lymphadenopathy and b/l solid pulmonary nodules largest measuring 8mm.  -Pathology of Left upper lobe lung nodule :  Adenocarcinoma of mullerian origin adenocarcinoma of Mullerian origin, morphologically consistent with endometrioid adenocarcinoma. The possible primary site include ovary, uterus or the peritoneum. While GATA3 staining can be seen in small percentage of endometrioid carcinoma, While GATA3 stainining can be seen in small percentage of endometrioid carcinoma, clinical correlation to rule out the possibility of breast carcinoma is recommended.    -PDL1 and NGS pending.  -Although the primary may be ovarian, however breast or other etiologies should be considered given the normal Ca 125. Apparently, the breast examination has been negative.  -MRI of head w contrast : unable to be obtained as pt is too big.  -CT head with contrast ordered.  -OBGYN recs repeat imaging of CT A/P with contrast and pathology for second opinion of pathology specimen.  -OBGYN recs no surgical intervention for now.  -GI was consulted due to elevated CEA.     # Leukocytosis and thrombocytosis likely reactive secondary to underlying malignancy  - peripheral flow Normal.  - BCR-ABL, Jak2, CALR, MPL Negative for any mutations.    #Microcytic anemia:  -Received 5 doses of Venofer for Iron deficiency anemia in 7/2022 during prior admission.    Recommendations.  -unable to obtain of B/L breasts diagnostic mammogram. Please ontain us breast if possible.  -will start on chemotherapy with carboplatin and taxol cycle 1 tomorrow or on monday.  -Pt was clearly explained the benefits and risks of chemotherapy.  -Pt agreeable to start chemotherapy.     -The side effects from chemotherapy have been discussed including but not limited to :Pain, hyponatremia, hypomagnesemia, hypocalcemia, hypokalemia, vomiting, abdominal pain, nausea and vomiting, bone marrow depression, anemia, leukopenia, neutropenia, thrombocytopenia, increased serum alkaline phosphatase, increased serum AST, hypersensitivity, weakness, decreased creatinine clearance, increased blood urea nitrogen; flushing, ECG abnormality, edema, hypotension, alopecia, skin rash, diarrhea, stomatitis, hemorrhage, hypersensitivity reaction, infection, injection site reaction, peripheral neuropathy, arthralgia, myalgia, asthenia, fever.    The chemotherapy dose was adjusted according to pt's height, weight, labs and anticipated tolerance.  The high risk of complications and complexity associated with antineoplastic therapy administration has been explained to the pt.    -Further recommendations for treatment after the above issues clarified. Patient is a 56 y/o F with a pmhx of lupus, hypothyroidism, antiphospholipid syndrome, HTN, anemia,  recently diagnosed  with  b/l adnexal masses and lung nodules, unknown primary disease (7/2022), presented with weakness and falls and admitted.      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  #Clinical corelation to r/o possibility of Breast cancer is recommended.  #Bilateral adnexal masses with para-aortic lymphadenopathy and pulmonary nodules suggestive of metastatic disease.  #Biopsy of Left upper lobe Lung nodule- Adenocarcinoma of mullerian origin.  PDL-1 and NGS pending.    -Ca 19-9, Ca125 normal  -CEA 5.5  -CA 15.3 and CA 27.29 pending.  -US Transvaginal : right 6.6 cm heterogeneous mixed solid-cystic mass and lower pelvic 5.6 cm heterogenous solid appearing may represent ovarian mass or degenerating fibroid.  -CT A/P W contrast 7/2022: B/l confluent hypodensities in b/l adnexae measuring 6.8 cm x 4.5 cm, paraaortic lymphadenopathy and b/l solid pulmonary nodules largest measuring 8mm.  -Pathology of Left upper lobe lung nodule :  Adenocarcinoma of mullerian origin adenocarcinoma, morphologically consistent with endometrioid adenocarcinoma. The possible primary sites include ovary, uterus or the peritoneum. While GATA3 staining can be seen in small percentage of endometrioid carcinoma, clinical correlation to rule out the possibility of breast carcinoma is recommended.    -PDL1 and NGS pending.  -Although the primary may be ovarian, however breast or other etiologies should be considered given the normal Ca 125. Apparently, the breast examination has been negative.  -MRI of head w contrast : unable to be obtained as pt is too big.  -CT head with contrast ordered.  -OBGYN recs repeat imaging of CT A/P with contrast and pathology for second opinion of pathology specimen.  -OBGYN recs no surgical intervention for now.  -GI was consulted due to elevated CEA.     # Leukocytosis and thrombocytosis likely reactive secondary to underlying malignancy  - Peripheral flow Normal.  - BCR-ABL, Jak2, CALR, MPL Negative for any mutations.    #Microcytic anemia:  -Received 5 doses of Venofer for Iron deficiency anemia in 7/2022 during prior admission.    Recommendations.  -Unable to obtain of B/L breasts diagnostic mammogram. Please obtain us breast if possible.  -Will start on chemotherapy with carboplatin and taxol cycle 1 tomorrow or on Monday (in 2 days).  -Pt was clearly explained the benefits and risks of chemotherapy.  -Pt agreeable to start chemotherapy.     -The side effects from chemotherapy have been discussed including but not limited to: Pain, hyponatremia, hypomagnesemia, hypocalcemia, hypokalemia, vomiting, abdominal pain, nausea and vomiting, bone marrow depression, anemia, leukopenia, neutropenia, thrombocytopenia, increased serum alkaline phosphatase, increased serum AST, hypersensitivity, weakness, decreased creatinine clearance, increased blood urea nitrogen; flushing, ECG abnormality, edema, hypotension, alopecia, skin rash, diarrhea, stomatitis, hemorrhage, hypersensitivity reaction, infection, injection site reaction, peripheral neuropathy, arthralgia, myalgia, asthenia, fever.    The chemotherapy dose was adjusted according to pt's height, weight, labs and anticipated tolerance.  The high risk of complications and complexity associated with antineoplastic therapy administration has been explained to the pt.    -Further recommendations for treatment after the above issues clarified.

## 2022-08-06 NOTE — PROGRESS NOTE ADULT - SUBJECTIVE AND OBJECTIVE BOX
KELSIE JUAREZ 57y Female  MRN#: [id number]     SUBJECTIVE  Patient is a 57y old Female who presents with a chief complaint of poor ambulation (02 Aug 2022 15:42)      Today is hospital day 7d, and this morning she is lying in bed without distress.   No acute overnight events.     OBJECTIVE  PAST MEDICAL & SURGICAL HISTORY  Lupus    Hypothyroidism    Iron deficiency anemia    No significant past surgical history      ALLERGIES:  NSAIDs (Stomach Upset)    MEDICATIONS:  STANDING MEDICATIONS  allopurinol 300 milliGRAM(s) Oral two times a day  aspirin  chewable 81 milliGRAM(s) Oral daily  dextrose 5%. 1000 milliLiter(s) IV Continuous <Continuous>  dextrose 5%. 1000 milliLiter(s) IV Continuous <Continuous>  dextrose 50% Injectable 25 Gram(s) IV Push once  dextrose 50% Injectable 12.5 Gram(s) IV Push once  dextrose 50% Injectable 25 Gram(s) IV Push once  diatrizoate meglumine/diatrizoate sodium. 30 milliLiter(s) Oral once  DULoxetine 60 milliGRAM(s) Oral daily  ferrous    sulfate 325 milliGRAM(s) Oral daily  furosemide    Tablet 20 milliGRAM(s) Oral daily  glucagon  Injectable 1 milliGRAM(s) IntraMuscular once  heparin   Injectable 5000 Unit(s) SubCutaneous three times a day  hydroxychloroquine 200 milliGRAM(s) Oral two times a day  levothyroxine 150 MICROGram(s) Oral daily  loperamide 2 milliGRAM(s) Oral every 4 hours  NIFEdipine XL 30 milliGRAM(s) Oral daily  nystatin Powder 1 Application(s) Topical two times a day  potassium phosphate / sodium phosphate Powder (PHOS-NaK) 1 Packet(s) Oral three times a day with meals  sodium bicarbonate 650 milliGRAM(s) Oral every 12 hours  sodium chloride 0.9%. 1000 milliLiter(s) IV Continuous <Continuous>    PRN MEDICATIONS  dextrose Oral Gel 15 Gram(s) Oral once PRN  oxycodone    5 mG/acetaminophen 325 mG 1 Tablet(s) Oral every 4 hours PRN  simethicone 80 milliGRAM(s) Chew four times a day PRN    HOME MEDICATIONS  Home Medications:  aspirin 81 mg oral tablet, chewable: 1 tab(s) orally once a day (22 Jul 2022 13:38)  DULoxetine 60 mg oral delayed release capsule: 1 cap(s) orally once a day (14 Jul 2022 22:46)  hydroxychloroquine 200 mg oral tablet: 1 tab(s) orally 2 times a day (14 Jul 2022 22:44)  lisinopril 40 mg oral tablet: 1 tab(s) orally once a day (14 Jul 2022 22:44)  oxycodone-acetaminophen 5 mg-325 mg oral tablet: 1 tab(s) orally every 4 hours, As needed, Moderate Pain (4 - 6) (22 Jul 2022 13:38)  Synthroid 150 mcg (0.15 mg) oral tablet: 1 tab(s) orally once a day (14 Jul 2022 22:46)      VITAL SIGNS: Last 24 Hours  T(C): 35.8 (06 Aug 2022 06:17), Max: 36.7 (05 Aug 2022 20:55)  T(F): 96.5 (06 Aug 2022 06:17), Max: 98 (05 Aug 2022 20:55)  HR: 95 (06 Aug 2022 07:05) (93 - 95)  BP: 93/52 (06 Aug 2022 07:05) (93/52 - 117/58)  BP(mean): --  RR: 18 (06 Aug 2022 06:17) (18 - 20)  SpO2: --      LABS:    08-05    135  |  96<L>  |  26<H>  ----------------------------<  52<LL>  3.2<L>   |  19  |  1.2    Ca    7.6<L>      05 Aug 2022 08:19  Phos  1.5     08-06  Mg     2.0     08-05          CAPILLARY BLOOD GLUCOSEPOCT Blood Glucose.: 84 mg/dL (05 Aug 2022 10:22)    PHYSICAL EXAM:  PHYSICAL EXAM:  GENERAL: NAD, AAO x 4, 57y F  HEAD:  Atraumatic, Normocephalic  EYES: EOMI, conjunctivae clear and sclerae white  NECK: Supple, No JVD  CHEST/LUNG: Clear to auscultation bilaterally; No wheeze; No crackles; No accessory muscles used  HEART: Regular rate and rhythm; No murmurs;   ABDOMEN: Soft, Nontender, Nondistended; Bowel sounds present; No guarding  EXTREMITIES:  2+ Peripheral Pulses, No cyanosis or edema  NEUROLOGY: non-focal      Pathology   ACCESSION No: [id number]   Patient: KELSIE JUAREZ   Accession: [id number]   Collected Date/Time: 7/21/2022 17:11 EDT   Received Date/Time: 7/21/2022 17:38 EDT   Fine Needle Aspiration Addendum Report - Auth (Verified)   Addendum   Immunohistochemical stains are performed and show the tumor is positive   for CK7, PAX-8, GATA3, focally positive for CDX-2, negative for CK20,   p40, TTF-1/napsin, p53, mammaglobin, GCDFP-15. 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 The possible primary site   include ovary, uterus or the pritoneum. While GATA3 stainining can be   seen in small percentage of endometrioid carcinoma, clinical correlation   to rule out the possibility of breast carcinoma is recommended.    is notified on 8/1/2022. NGS and PD-L1 staining are   pending.   Verified by: Eva Restrepo M.D.   (Electronic Signature)   Reported on: 08/04/22 19:10 EDT, [place] [address]   Phone: [phone] Fax: [phone]   _________________________________________________________________   Fine NeedleAspiration Addendum Report - Auth (Verified)   Addendum   Immunohistochemical stains are performed and show the tumor is positive   for CK7, PAX-8, GATA3, focally positive for CDX-2, negative for CK20,   p40, TTF-1/napsin;  is notified on 8/1/2022. Additional IHC pending.   NGS and PD-L1 staining are pending.   Verified by: Eva Restrepo M.D.   (Electronic Signature)   Reported on: 08/01/22 14:30 EDT, [place] [address]   Phone: [phone] Fax: [phone]   _________________________________________________________________   Fine Needle Aspiration Report - Auth (Verified)   Specimen(s) Submitted   Lung nodule, left upper lobe, CT-guided core bx   Final Diagnosis   LUNG NODULE, LEFT UPPER LOBE, CT-GUIDED BIOPSY AND IMPRINTS:   - POSITIVE FOR MALIGNANT CELLS.   - Adenocarcinoma.   - Immunohistochemical stains pending.   Screened by: Eva Restrepo M.D.   Verified by: Eva Restrepo M.D.   (Electronic Signature)   Reported on: 07/22/22 11:50 EDT, [facility],   [place] [address]   Phone: [phone] Fax: [phone]   _________________________________________________________________   Statement of Adequacy   On-site adequacy assessment performed by Dr. Restrepo:   - Lesional tissue obtained; satisfactory for interpretation.   - Dr. Alston is notified.   The test was performed on 7/21/2022 and at [facility], [address] [place] [address].   Clinical Information   Left lung nodule, hypercalcemia   Gross Description   Received are 2 DQ stained smears and a formalin container with multiple   tissue fragments measuring 1.4 x 0.2 cm in length with a diameter   of < 0.1 cm. Tissue fragments are submitted entirely in 2 cassettes.   All slides and containers are labeled with patient 's name and date of   birth. Grossing Jarod Barton, 7/21/2022 @ 4:00pm. (07.21.22 @ 17:11)    ADMISSION SUMMARY  Patient is a 57y old Female who presents with a chief complaint of poor ambulation (02 Aug 2022 15:42)    KELSIE JUAREZ 57y Female  MRN#: [id number]     SUBJECTIVE  Patient is a 57y old Female who presents with a chief complaint of poor ambulation (02 Aug 2022 15:42)      Today is hospital day 7d, and this morning she is lying in bed without distress.   No acute overnight events.     OBJECTIVE  PAST MEDICAL & SURGICAL HISTORY  Lupus    Hypothyroidism    Iron deficiency anemia    No significant past surgical history      ALLERGIES:  NSAIDs (Stomach Upset)    MEDICATIONS:  STANDING MEDICATIONS  allopurinol 300 milliGRAM(s) Oral two times a day  aspirin  chewable 81 milliGRAM(s) Oral daily  dextrose 5%. 1000 milliLiter(s) IV Continuous <Continuous>  dextrose 5%. 1000 milliLiter(s) IV Continuous <Continuous>  dextrose 50% Injectable 25 Gram(s) IV Push once  dextrose 50% Injectable 12.5 Gram(s) IV Push once  dextrose 50% Injectable 25 Gram(s) IV Push once  diatrizoate meglumine/diatrizoate sodium. 30 milliLiter(s) Oral once  DULoxetine 60 milliGRAM(s) Oral daily  ferrous    sulfate 325 milliGRAM(s) Oral daily  furosemide    Tablet 20 milliGRAM(s) Oral daily  glucagon  Injectable 1 milliGRAM(s) IntraMuscular once  heparin   Injectable 5000 Unit(s) SubCutaneous three times a day  hydroxychloroquine 200 milliGRAM(s) Oral two times a day  levothyroxine 150 MICROGram(s) Oral daily  loperamide 2 milliGRAM(s) Oral every 4 hours  NIFEdipine XL 30 milliGRAM(s) Oral daily  nystatin Powder 1 Application(s) Topical two times a day  potassium phosphate / sodium phosphate Powder (PHOS-NaK) 1 Packet(s) Oral three times a day with meals  sodium bicarbonate 650 milliGRAM(s) Oral every 12 hours  sodium chloride 0.9%. 1000 milliLiter(s) IV Continuous <Continuous>    PRN MEDICATIONS  dextrose Oral Gel 15 Gram(s) Oral once PRN  oxycodone    5 mG/acetaminophen 325 mG 1 Tablet(s) Oral every 4 hours PRN  simethicone 80 milliGRAM(s) Chew four times a day PRN    HOME MEDICATIONS  Home Medications:  aspirin 81 mg oral tablet, chewable: 1 tab(s) orally once a day (22 Jul 2022 13:38)  DULoxetine 60 mg oral delayed release capsule: 1 cap(s) orally once a day (14 Jul 2022 22:46)  hydroxychloroquine 200 mg oral tablet: 1 tab(s) orally 2 times a day (14 Jul 2022 22:44)  lisinopril 40 mg oral tablet: 1 tab(s) orally once a day (14 Jul 2022 22:44)  oxycodone-acetaminophen 5 mg-325 mg oral tablet: 1 tab(s) orally every 4 hours, As needed, Moderate Pain (4 - 6) (22 Jul 2022 13:38)  Synthroid 150 mcg (0.15 mg) oral tablet: 1 tab(s) orally once a day (14 Jul 2022 22:46)      VITAL SIGNS: Last 24 Hours  T(C): 35.8 (06 Aug 2022 06:17), Max: 36.7 (05 Aug 2022 20:55)  T(F): 96.5 (06 Aug 2022 06:17), Max: 98 (05 Aug 2022 20:55)  HR: 95 (06 Aug 2022 07:05) (93 - 95)  BP: 93/52 (06 Aug 2022 07:05) (93/52 - 117/58)  BP(mean): --  RR: 18 (06 Aug 2022 06:17) (18 - 20)  SpO2: --      LABS:    08-05    135  |  96<L>  |  26<H>  ----------------------------<  52<LL>  3.2<L>   |  19  |  1.2    Ca    7.6<L>      05 Aug 2022 08:19  Phos  1.5     08-06  Mg     2.0     08-05          CAPILLARY BLOOD GLUCOSEPOCT Blood Glucose.: 84 mg/dL (05 Aug 2022 10:22)    PHYSICAL EXAM:  PHYSICAL EXAM:  GENERAL: NAD, AAO x 4, 57y F, obese, needing help to get up.  HEAD:  Atraumatic, Normocephalic  EYES: EOMI, conjunctivae clear and sclerae white  NECK: Supple, No JVD  CHEST/LUNG: Clear to auscultation bilaterally; No wheeze; No crackles; No accessory muscles used  HEART: Regular rate and rhythm; No murmurs;   ABDOMEN: Soft, Nontender, Nondistended; Bowel sounds present; No guarding  EXTREMITIES:  2+ Peripheral Pulses, No cyanosis or edema  NEUROLOGY: non-focal      Pathology   ACCESSION No: [id number]   Patient: KELSIE JUAREZ   Accession: [id number]   Collected Date/Time: 7/21/2022 17:11 EDT   Received Date/Time: 7/21/2022 17:38 EDT   Fine Needle Aspiration Addendum Report - Auth (Verified)   Addendum   Immunohistochemical stains are performed and show the tumor is positive   for CK7, PAX-8, GATA3, focally positive for CDX-2, negative for CK20,   p40, TTF-1/napsin, p53, mammaglobin, GCDFP-15. 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 The possible primary site   include ovary, uterus or the peritoneum. While GATA3 staining can be   seen in small percentage of endometrioid carcinoma, clinical correlation   to rule out the possibility of breast carcinoma is recommended.   Dr. Sheets is notified on 8/1/2022. NGS and PD-L1 staining are   pending.   Verified by: Eva Restrepo M.D.   (Electronic Signature)   Reported on: 08/04/22 19:10 EDT, [place] [address]   Phone: [phone] Fax: [phone]   _________________________________________________________________   Fine NeedleAspiration Addendum Report - Auth (Verified)   Addendum   Immunohistochemical stains are performed and show the tumor is positive   for CK7, PAX-8, GATA3, focally positive for CDX-2, negative for CK20,   p40, TTF-1/napsin;  is notified on 8/1/2022. Additional IHC pending.   NGS and PD-L1 staining are pending.   Verified by: Eva Restrepo M.D.   (Electronic Signature)   Reported on: 08/01/22 14:30 EDT, [place] [address]   Phone: [phone] Fax: [phone]   _________________________________________________________________   Fine Needle Aspiration Report - Auth (Verified)   Specimen(s) Submitted   Lung nodule, left upper lobe, CT-guided core bx   Final Diagnosis   LUNG NODULE, LEFT UPPER LOBE, CT-GUIDED BIOPSY AND IMPRINTS:   - POSITIVE FOR MALIGNANT CELLS.   - Adenocarcinoma.   - Immunohistochemical stains pending.   Screened by: Eva Restrepo M.D.   Verified by: Eva Restrepo M.D.   (Electronic Signature)   Reported on: 07/22/22 11:50 EDT, [facility],   [place] [address]   Phone: [phone] Fax: [phone]   _________________________________________________________________   Statement of Adequacy   On-site adequacy assessment performed by Dr. Restrepo:   - Lesional tissue obtained; satisfactory for interpretation.   - Dr. Alston is notified.   The test was performed on 7/21/2022 and at [facility], [address] [place] [address].   Clinical Information   Left lung nodule, hypercalcemia   Gross Description   Received are 2 DQ stained smears and a formalin container with multiple   tissue fragments measuring 1.4 x 0.2 cm in length with a diameter   of < 0.1 cm. Tissue fragments are submitted entirely in 2 cassettes.   All slides and containers are labeled with patient 's name and date of   birth. Grossing by RADHA Barton, 7/21/2022 @ 4:00pm. (07.21.22 @ 17:11)    ADMISSION SUMMARY  Patient is a 57y old Female who presents with a chief complaint of poor ambulation (02 Aug 2022 15:42)

## 2022-08-06 NOTE — PROGRESS NOTE ADULT - SUBJECTIVE AND OBJECTIVE BOX
KELSIE JUAREZ  57y  Female  ***My note supersedes ALL resident notes that I sign.  My corrections for their notes are in my note.***    I can be reached directly on [email or web address]. My office number is [phone]. My personal cell number is [phone].    INTERVAL EVENTS: Here for f/u of cancer. Diarrhea is better w/ imodium. Abd pain is present, but relieved w/ percocet. H/O is going to start chemo and pt agrees.     T(F): 96.5 (08-06-22 @ 06:17), Max: 98 (08-05-22 @ 20:55)  HR: 95 (08-06-22 @ 07:05) (93 - 95)  BP: 93/52 (08-06-22 @ 07:05) (93/52 - 117/58)  RR: 18 (08-06-22 @ 06:17) (18 - 20)  SpO2: --    Gen: NAD  HEENT: PERRL, EOMI, mouth clr, nose clr  Neck: no nodes, no JVD, thyroid nl  lungs: clr  hrt: s1 s2 reg, tachy (HR 94 on my exam), no murmur  abd: soft, obese, ND, mildly tender lower abd; no HS megaly; hard to eval for mass given body habitus  ext: 1+ edema, no c/c  neuro: aa ox3, cn intact, can move all 4 ext, but legs very weak    LABS:  --   (   --   (   --      08-06-22 @ 07:31  ----------------------               --   (   --   (   --                             -----                        --  Ca  --   Mg  --    P   1.5     CAPILLARY BLOOD GLUCOSE  POCT Blood Glucose.: 84 (08-05-22 @ 10:22)  POCT Blood Glucose.: 104 (08-04-22 @ 12:22)  POCT Blood Glucose.: 96 (08-04-22 @ 08:40)  POCT Blood Glucose.: 94 (08-03-22 @ 21:25)  POCT Blood Glucose.: 99 (08-03-22 @ 17:39)    RADIOLOGY & ADDITIONAL TESTS:    MEDICATIONS:  hydroxychloroquine 200 milliGRAM(s) Oral two times a day    allopurinol 300 milliGRAM(s) Oral two times a day  aspirin  chewable 81 milliGRAM(s) Oral daily  DULoxetine 60 milliGRAM(s) Oral daily  ferrous    sulfate 325 milliGRAM(s) Oral daily  furosemide    Tablet 20 milliGRAM(s) Oral daily  heparin   Injectable 5000 Unit(s) SubCutaneous three times a day  levothyroxine 150 MICROGram(s) Oral daily  loperamide 2 milliGRAM(s) Oral every 4 hours  NIFEdipine XL 30 milliGRAM(s) Oral daily  nystatin Powder 1 Application(s) Topical two times a day  oxycodone    5 mG/acetaminophen 325 mG 1 Tablet(s) Oral every 4 hours PRN  potassium phosphate / sodium phosphate Powder (PHOS-NaK) 1 Packet(s) Oral three times a day with meals  simethicone 80 milliGRAM(s) Chew four times a day PRN  sodium bicarbonate 650 milliGRAM(s) Oral every 12 hours  sodium chloride 0.9%. 1000 milliLiter(s) IV Continuous <Continuous>  sodium phosphate IVPB 30 milliMole(s) IV Intermittent once

## 2022-08-06 NOTE — PROGRESS NOTE ADULT - ASSESSMENT
Pt is a 56 y/o F with PMHx of lupus, hypothyroidism, antiphospholipid syndrome, HTN, and anemia who presented to the ER complaining of diffuse pelvic pain and back pain x 3 weeks associated with decreased appetite and vomiting over the last 8 days.    # COVID + on last admit  was exposed 7/16 to another pt in hosp w/ COVID;  also had COVID  1st COVID + 7/21 -> 21 days of isolation -> to 8/11  pt s/p Paxlovid as outpt recently  seems asymp, except altered taste and smell  NOT on O2   this is NOT why WBC high    # diarrhea - not sure why - poss irritation from tumor vs other; metab acidosis  C Diff neg; GI pcr neg  imodium 2mg po q4 for now (not prn) - helping  d/c neutraphos pkts - pt says this is contributing to diarrhea  NaHCO3 650mg po q12  GI eval    # leg edema; HTN  lisinopril off (GOYO)  lasix 20mg po q24  d/c Nifed - BP getting on low side    # hypokalemia  s/p K-dur   might worsen w/ combo of lasix and diarrhea - will watch for this    # hypophosphatemia  prob effect of PTHRP and malnutrition w/ refeeding syndrome  pt refusing neutraphos pkt 2/2 diarrhea  give Na Phos 30mmol x1 today iv  give dairy (milk) or meat to incr dietary phos  rpt phos q24 til >2    # hypercalcemia - much better now  iPTH < 20 -> hyperpara RULED OUT  likely malign in origin - PTHrP 11 (nl <2) and + adenoCa on bx (PTHrP is secreted by many tumors - ie, not specific)  s/p Pamidronate 90mg iv x1 last admit  25 OH Vit D 74 (upper limit of nl)    # confirmed malignancy (adenoCa of Mullerian Origin?): b/l adnexal lesions (primary vs metastatic ([place])); paraaortic LN; 15+ pulm nodules;   Primary Site: hard to say; distribution of path, symptoms, tumor markers do NOT lead to a clear primary site  GI eval: might do upper/lower endo next week  CT C: 15+ pulm nod up to 1.7cm - IR s/p bx of lung nod 7/21  f/u path: + adenoCa, Mullerian Origin (?)  CT A/P: bilateral adnexal hypodensities, para-aortic lymphadenopathy as well as bilateral pulm nodules  TVUS: Rt 6.6 cm mixed solid-cystic structure; Lt 5.6 cm heterog, solid structure w/ doppler flow   H/O: will start carb/taxol soon  Gyn/Onc noted  : 33 (nl); CA 19-9: 19 (nl); CEA 5.5; ; HCG < 0.6; S preg neg; ESR 80;  uric acid 6.7 -> 11.5 (prob cell turnover) - on allopurinol 300mg po q12 (f/u h/o) -> rpt Uric Acid héctor  Inhibin A: neg; B: slightly elevated   f/u CA 15-3 and CA 27-29  spoke w/ MRI, pt does NOT fit into MRI gantry, thus cannot do MRI-B nor MRI pelvis  obtain CT Head w/ IV contrast (r/o brain mets)  GYN/ONC req: obtain rpt CT A/P w/ oral and IV cont  IVFs: NS 75/hr in prep for contrast  will hold off on mammogram (logistically too hard to obtain in obese pt who cannot stand and machine in another building)  ordered US breast b/l to r/o mass  percocet 5/325mg po q4 prn neopl-related pain     # marked leukocytosis and tachycardia = SIRS 2/2 malign  prob reactive   bld cx all neg   no abx for now  BCR/ABL neg; JAK2 neg; flow cyto neg; CALR neg; MPL mut analysis neg   cbc NOT needed daily    # microcytic anemia  prob of malign  iron sat 10%, TIBC 314, Ferr 85 = could be iron def  no IV iron for now given SIRS  FeSO4 325mg po q24  GI eval  B12, Fol nl  hapto 262   (upper nl)  retic 2.7%; t red 0.3   - this is NOT hemolysis, even intramedullary; I suspect this is a marker for malign or cell turnover    # minor GOYO - prob dehydration;  Renal U/S: no hydro  IVFs in prep for contrast  BMP q48    # prior thrombocytosis - now better    # abnl LFTs mild (incr AP and slight incr AST)  prob related to underlying malign  RUQ U/S: GB stones w/out cholecystitis  no further w/u for now  no need to follow LFTs    # lupus - does not seem active currently  c/w HCQ 200mg po q12  c/w cymbalta    # antiphospholipid syndrome  prob just element of lupus hx  no clots; only 1 miscarriage  cont asa   no a/c    # Hypothyroidism  c/w levothyroxine 150mg oral daily  TSH 8.61 - would NOT raise dose given tachycardia  free T4 nl and free T3 low (prob sick euthyroid)    # no hx of DM  d/c all FS - they are always nl    # DVT ppx: hep 5000 sc q8    # GI ppx: none    # Activity: used to walk and cannot now; s/p fall at home; PT eval    # Code: full    Dispo: GI eval; f/u H/O re chemo; CT H w/ IV cont; CT A/P w/ oral/iv cont; f/u breast tumor markers; breast US; tx phos; tx pain; tx diarrhea; check uric acid; c/w lasix  eventually, pt will need STR, either 4A or SNF vs home - not ready for d/c yet    Prog is very guarded.

## 2022-08-07 NOTE — PROGRESS NOTE ADULT - SUBJECTIVE AND OBJECTIVE BOX
KELSIE JUAREZ 57y Female  MRN#: [id number]   Hospital Day: 8d    SUBJECTIVE  Patient is a 57y old Female who presents with a chief complaint of poor ambulation (02 Aug 2022 15:42)  Currently admitted to medicine with the primary diagnosis of GOYO (acute kidney injury)      INTERVAL HPI AND OVERNIGHT EVENTS:  Patient was examined and seen at bedside. This morning she is resting in bed. Pt is thinking about options for starting chemotherapy. Has reported some improvement with BMs. No issues overnight, vitals stable.     OBJECTIVE  PAST MEDICAL & SURGICAL HISTORY  Lupus    Hypothyroidism    Iron deficiency anemia    No significant past surgical history      ALLERGIES:  NSAIDs (Stomach Upset)    MEDICATIONS:  STANDING MEDICATIONS  allopurinol 300 milliGRAM(s) Oral daily  aspirin  chewable 81 milliGRAM(s) Oral daily  dextrose 5%. 1000 milliLiter(s) IV Continuous <Continuous>  dextrose 5%. 1000 milliLiter(s) IV Continuous <Continuous>  dextrose 50% Injectable 25 Gram(s) IV Push once  dextrose 50% Injectable 12.5 Gram(s) IV Push once  dextrose 50% Injectable 25 Gram(s) IV Push once  DULoxetine 60 milliGRAM(s) Oral daily  ferrous    sulfate 325 milliGRAM(s) Oral daily  furosemide    Tablet 20 milliGRAM(s) Oral daily  glucagon  Injectable 1 milliGRAM(s) IntraMuscular once  hydroxychloroquine 200 milliGRAM(s) Oral two times a day  levothyroxine 150 MICROGram(s) Oral daily  loperamide 2 milliGRAM(s) Oral every 4 hours  nystatin Powder 1 Application(s) Topical two times a day  sodium bicarbonate 650 milliGRAM(s) Oral every 8 hours    PRN MEDICATIONS  dextrose Oral Gel 15 Gram(s) Oral once PRN  oxycodone    5 mG/acetaminophen 325 mG 2 Tablet(s) Oral every 4 hours PRN  simethicone 80 milliGRAM(s) Chew four times a day PRN      VITAL SIGNS: Last 24 Hours  T(C): 35.8 (07 Aug 2022 05:51), Max: 36.2 (06 Aug 2022 20:12)  T(F): 96.5 (07 Aug 2022 05:51), Max: 97.1 (06 Aug 2022 20:12)  HR: 95 (07 Aug 2022 05:51) (95 - 95)  BP: 114/56 (07 Aug 2022 05:51) (114/56 - 119/60)  BP(mean): --  RR: 18 (07 Aug 2022 05:51) (18 - 18)  SpO2: --    LABS:                        8.4    58.76 )-----------( 310      ( 07 Aug 2022 08:59 )             25.2     08-07    137  |  100  |  23<H>  ----------------------------<  65<L>  3.5   |  18  |  1.0    Ca    7.6<L>      07 Aug 2022 08:59  Phos  2.3     08-07                    RADIOLOGY:      PHYSICAL EXAM:  CONSTITUTIONAL: No acute distress, well-developed, well-groomed, AAOx3  HEAD: Atraumatic, normocephalic  EYES: EOM intact, PERRLA, conjunctiva and sclera clear  ENT: Supple, no masses, no thyromegaly, no bruits, no JVD; moist mucous membranes  PULMONARY: Clear to auscultation bilaterally; no wheezes, rales, or rhonchi  CARDIOVASCULAR: Regular rate and rhythm; no murmurs, rubs, or gallops  GASTROINTESTINAL: Soft, non-tender, non-distended; bowel sounds present  MUSCULOSKELETAL: 2+ peripheral pulses; no clubbing, no cyanosis, no edema  NEUROLOGY: non-focal  SKIN: No rashes or lesions; warm and dry    ASSESSMENT & PLAN  #    PAST MEDICAL & SURGICAL HISTORY:  Lupus      Hypothyroidism      Iron deficiency anemia      No significant past surgical history          #Misc  - DVT Prophylaxis:  - Diet:  - GI Prophylaxis:  - Activity:  - IV Fluids:  - Code Status:    Dispo: KELSIE JUAREZ 57y Female  MRN#: [id number]   Hospital Day: 8d    SUBJECTIVE  Patient is a 57y old Female who presents with a chief complaint of poor ambulation (02 Aug 2022 15:42)  Currently admitted to medicine with the primary diagnosis of GOYO (acute kidney injury)      INTERVAL HPI AND OVERNIGHT EVENTS:  Patient was examined and seen at bedside. This morning she is resting in bed. Pt is thinking about options for starting chemotherapy. Has reported some improvement with BMs. No issues overnight, vitals stable.     OBJECTIVE  PAST MEDICAL & SURGICAL HISTORY  Lupus    Hypothyroidism    Iron deficiency anemia    No significant past surgical history      ALLERGIES:  NSAIDs (Stomach Upset)    MEDICATIONS:  STANDING MEDICATIONS  allopurinol 300 milliGRAM(s) Oral daily  aspirin  chewable 81 milliGRAM(s) Oral daily  dextrose 5%. 1000 milliLiter(s) IV Continuous <Continuous>  dextrose 5%. 1000 milliLiter(s) IV Continuous <Continuous>  dextrose 50% Injectable 25 Gram(s) IV Push once  dextrose 50% Injectable 12.5 Gram(s) IV Push once  dextrose 50% Injectable 25 Gram(s) IV Push once  DULoxetine 60 milliGRAM(s) Oral daily  ferrous    sulfate 325 milliGRAM(s) Oral daily  furosemide    Tablet 20 milliGRAM(s) Oral daily  glucagon  Injectable 1 milliGRAM(s) IntraMuscular once  hydroxychloroquine 200 milliGRAM(s) Oral two times a day  levothyroxine 150 MICROGram(s) Oral daily  loperamide 2 milliGRAM(s) Oral every 4 hours  nystatin Powder 1 Application(s) Topical two times a day  sodium bicarbonate 650 milliGRAM(s) Oral every 8 hours    PRN MEDICATIONS  dextrose Oral Gel 15 Gram(s) Oral once PRN  oxycodone    5 mG/acetaminophen 325 mG 2 Tablet(s) Oral every 4 hours PRN  simethicone 80 milliGRAM(s) Chew four times a day PRN      VITAL SIGNS: Last 24 Hours  T(C): 35.8 (07 Aug 2022 05:51), Max: 36.2 (06 Aug 2022 20:12)  T(F): 96.5 (07 Aug 2022 05:51), Max: 97.1 (06 Aug 2022 20:12)  HR: 95 (07 Aug 2022 05:51) (95 - 95)  BP: 114/56 (07 Aug 2022 05:51) (114/56 - 119/60)  BP(mean): --  RR: 18 (07 Aug 2022 05:51) (18 - 18)  SpO2: --    LABS:                        8.4    58.76 )-----------( 310      ( 07 Aug 2022 08:59 )             25.2     08-07    137  |  100  |  23<H>  ----------------------------<  65<L>  3.5   |  18  |  1.0    Ca    7.6<L>      07 Aug 2022 08:59  Phos  2.3     08-07                    RADIOLOGY:  RU Q US 8/1/22: Cholelithiasis without ductal dilation   US KB 8/1/22: Normal renal ultrasound, pelvic mass w/ compression on urinary bladder  Venous LE duplex 8/1: No evidence of deep venous thrombosis in either lower extremity. Bilateral peroneal veins are not visualized  CT chest w/ IV con 7/15: Right pelvic 6.6 cm heterogeneous mixed solid-cystic structure; may   represent ovarian mass. Lower pelvic 5.6 cm heterogeneous, solid appearing structure without   appreciable Doppler flow; may represent additional ovarian mass or degenerating fibroid.    PHYSICAL EXAM:  CONSTITUTIONAL: No acute distress, obese, well-groomed, cooperative, AAOx3  PULMONARY: Clear to auscultation bilaterally; no wheezes, rales, or rhonchi  CARDIOVASCULAR: Regular rate and rhythm; no murmurs, rubs, or gallops  GASTROINTESTINAL: Soft, rounded obese abdomen, bowel sounds present, non tender, red dry rash present under skin folds  MUSCULOSKELETAL: 3+ pitting edema b/l, distal pulses unable to palpate, no clubbing/cyanosis  Skin: clean, dry, intact, erythematous rash in abdominal and pelvic skin folds     KELSIE JUAREZ 57y Female  MRN#: [id number]   Hospital Day: 8d    SUBJECTIVE  Patient is a 57y old Female who presents with a chief complaint of poor ambulation (02 Aug 2022 15:42)  Currently admitted to medicine with the primary diagnosis of GOYO (acute kidney injury)      INTERVAL HPI AND OVERNIGHT EVENTS:  Patient was examined and seen at bedside. This morning she is resting in bed. Pt is thinking about options for starting chemotherapy. Has reported some improvement with BMs. No issues overnight, vitals stable.     OBJECTIVE  PAST MEDICAL & SURGICAL HISTORY  Lupus    Hypothyroidism    Iron deficiency anemia    No significant past surgical history      ALLERGIES:  NSAIDs (Stomach Upset)    MEDICATIONS:  STANDING MEDICATIONS  allopurinol 300 milliGRAM(s) Oral daily  aspirin  chewable 81 milliGRAM(s) Oral daily  dextrose 5%. 1000 milliLiter(s) IV Continuous <Continuous>  dextrose 5%. 1000 milliLiter(s) IV Continuous <Continuous>  dextrose 50% Injectable 25 Gram(s) IV Push once  dextrose 50% Injectable 12.5 Gram(s) IV Push once  dextrose 50% Injectable 25 Gram(s) IV Push once  DULoxetine 60 milliGRAM(s) Oral daily  ferrous    sulfate 325 milliGRAM(s) Oral daily  furosemide    Tablet 20 milliGRAM(s) Oral daily  glucagon  Injectable 1 milliGRAM(s) IntraMuscular once  hydroxychloroquine 200 milliGRAM(s) Oral two times a day  levothyroxine 150 MICROGram(s) Oral daily  loperamide 2 milliGRAM(s) Oral every 4 hours  nystatin Powder 1 Application(s) Topical two times a day  sodium bicarbonate 650 milliGRAM(s) Oral every 8 hours    PRN MEDICATIONS  dextrose Oral Gel 15 Gram(s) Oral once PRN  oxycodone    5 mG/acetaminophen 325 mG 2 Tablet(s) Oral every 4 hours PRN  simethicone 80 milliGRAM(s) Chew four times a day PRN      VITAL SIGNS: Last 24 Hours  T(C): 35.8 (07 Aug 2022 05:51), Max: 36.2 (06 Aug 2022 20:12)  T(F): 96.5 (07 Aug 2022 05:51), Max: 97.1 (06 Aug 2022 20:12)  HR: 95 (07 Aug 2022 05:51) (95 - 95)  BP: 114/56 (07 Aug 2022 05:51) (114/56 - 119/60)  BP(mean): --  RR: 18 (07 Aug 2022 05:51) (18 - 18)  SpO2: --    LABS:                        8.4    58.76 )-----------( 310      ( 07 Aug 2022 08:59 )             25.2     08-07    137  |  100  |  23<H>  ----------------------------<  65<L>  3.5   |  18  |  1.0    Ca    7.6<L>      07 Aug 2022 08:59  Phos  2.3     08-07                    RADIOLOGY:  RU Q US 8/1/22: Cholelithiasis without ductal dilation   US KB 8/1/22: Normal renal ultrasound, pelvic mass w/ compression on urinary bladder  Venous LE duplex 8/1: No evidence of deep venous thrombosis in either lower extremity. Bilateral peroneal veins are not visualized  CT chest w/ IV con 7/15: Right pelvic 6.6 cm heterogeneous mixed solid-cystic structure; may   represent ovarian mass. Lower pelvic 5.6 cm heterogeneous, solid appearing structure without   appreciable Doppler flow; may represent additional ovarian mass or degenerating fibroid.    PHYSICAL EXAM:  CONSTITUTIONAL: No acute distress, obese, well-groomed, cooperative, AAOx3  PULMONARY: Clear to auscultation bilaterally; no wheezes, rales, or rhonchi  CARDIOVASCULAR: Regular rate and rhythm; no murmurs, rubs, or gallops  GASTROINTESTINAL: Soft, rounded obese abdomen, bowel sounds present, non tender, red dry rash present under skin folds  MUSCULOSKELETAL: 3+ pitting edema b/l, distal pulses unable to palpate, no clubbing/cyanosis  Skin: clean, dry, intact, erythematous rash in abdominal and pelvic skin folds, facial flushing

## 2022-08-07 NOTE — PROGRESS NOTE ADULT - ASSESSMENT
# 58 y/o F with PMHx of lupus, hypothyroidism, antiphospholipid syndrome, HTN, and anemia complaining of pelvic and back pain for 3 weeks with decreased appetite and vomiting for 8 days admitted for progressive generalized weakness and fall.     #Generalized weakness  #Fall  #covid+ prior admission  - h/o general weakness from previous covid infections, reports sxs improve about 1-2weeks after recovery  - 1st covid +ve 7/21 needs 21 days of isolation to 8/11  - s/p paxlovid outpatient  - bed bound pt states too weak to remain standing  - orthostatics -ve  - , -ve  - c/w PT possible d/c to STR    #confirmed malignancy (AdenoCA): b/l adnexal lesions; paraaortic LN; 15+ pulm nodules;   #leukocytosis with neutrophilic predominance  - CT Chest: 15+ pulm nod up to 1.7cm -s/p IR bx of lung nod 7/21. 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  - Pending final path report and IHC  - CT A/P: bilateral adnexal hypodensities, para-aortic lymphadenopathy as well as bilateral pulm nodules  - TVUS: Rt 6.6cm mixed solid-cystic structure; Lt 5.6 cm heterog, solid structure w/ doppler flow   - : 33 (nl); CA 19-9: 19 (nl); CEA 5.5; ; HCG < 0.6; S preg neg; ESR 80; uric acid 6.7 Inhibin A: neg; B: slightly elevated   - Heme/onc and gyn/onc following   - Pt unable to fit in MRI machine and difficult to obtain mammogram due to patients inability to stand  - CT head, abdomen, pelvis w/ IV and oral contrast, pt on LR @75cc to improve GOYO   - f/u CA 15-3 and CA 27-29   - GI following for diarrhea, will evaluate for possible GI tumor, plan for EGD/colonoscopy  - c/w Dilaudid 2mg po q6 prn. Monitor BMs     #GOYO/HAGMA  #Hypokalemia  #Hypophosphatemia  #Hypercalcemia  - K-dur 20meq po Q2hrs x2  - suspected PTHrP and mulnutrition w/ refeeding syndrome  - Na-Phos 30mmol over 6hs IV 1x today  - Neutraphos 1packet TID w/ meals  - Phos q24 until >2 last 1.7 repeat in AM  - iPTH <20, PTHrP 11  - s/p pamidronate 90mg 1x previous admission  - zofran 4mg PO q6hr PRN  - Uric acid 11, c/w allopurinol 300mg PO q12  - possibly 2/2 tumor burden (WBC 50k w/ NT predominance)  - allopurinol 300 BID   - BMP q48hr monitor for K, phos    #leukocytosis and tachycardia - SIRS positive  - suspected in setting of malignancy  - Bcx negative  - monitor off Abx  - BCR/ABL, JAK2, flow cytometry, CALR, MPL mutation all negative  - possible BM Bx OP    #Diarrhea, improving  - gi pcr and c. diff negative  - c/w on loperamide and simethicone     #Alk phos elevation   - RUQ US - cholelithiasis with ductal dilation   - trending 451> 457    # microcytic hypochromic anemia  - Last admission:   iron sat 10%, TIBC 314, Ferr 85 = could be iron def  - FeSO4 325mg po q24  - B12, Fol nl  - hapto 262  - MMA level normal 337  - retic 2.7%; t red 0.3  -  - this is NOT hemolysis, even intramedullary; possible marker for lymphoma or tumor burden    #abnormal LFTs  - RUQ US - GB stones w/out cholecystitis  - no current workup     # antiphospholipid syndrome  - no hx of clots; only 1 miscarriage  - on aspirin  - no a/c    # lupus - not in active flare  - c/w HCQ 200mg po q12   - c/w cymbalta    # HTN  #leg edema  #GOYO  - held home lisinopril 40mg oral daily in setting of GOYO - Cr. 1.5 w/ baseline ~0.8  - nifedipine adjusted to 30mg POD  - lasix 20mg q24hr    # Hypothyroidism  - c/w levothyroxine 150mg oral daily  - TSH 8.61 - f/u Free T4 nl and low T3 - suspected euthyroid sick syndrome    #Misc  - DVT Prophylaxis: hep SQ TID   - Diet: DASH/TLC  - GI Prophylaxis: none  - Activity: bed bound  - IV Fluids: LR @75cc  - Code Status: FULL    Dispo: [facility]     # 56 y/o F with PMHx of lupus, hypothyroidism, antiphospholipid syndrome, HTN, and anemia complaining of pelvic and back pain for 3 weeks with decreased appetite and vomiting for 8 days admitted for progressive generalized weakness and fall.     #Generalized weakness  #Fall  #covid+ prior admission  - h/o general weakness from previous covid infections, reports sxs improve about 1-2weeks after recovery  - 1st covid +ve 7/21 needs 21 days of isolation to 8/11  - s/p paxlovid outpatient  - bed bound pt states too weak to remain standing, doing exercises in bed  - orthostatics -ve  - , -ve  - c/w PT possible d/c to STR    #confirmed malignancy (AdenoCA): b/l adnexal lesions; paraaortic LN; 15+ pulm nodules;   #leukocytosis with neutrophilic predominance  - CT Chest: 15+ pulm nod up to 1.7cm -s/p IR bx of lung nod 7/21. 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  - Pending final path report and IHC  - CT A/P: bilateral adnexal hypodensities, para-aortic lymphadenopathy as well as bilateral pulm nodules  - TVUS: Rt 6.6cm mixed solid-cystic structure; Lt 5.6 cm heterog, solid structure w/ doppler flow   - : 33 (nl); CA 19-9: 19 (nl); CEA 5.5; ; HCG < 0.6; S preg neg; ESR 80; uric acid 6.7 Inhibin A: neg; B: slightly elevated   - Heme/onc and gyn/onc following   - Pt unable to fit in MRI machine and difficult to obtain mammogram due to patients inability to stand  - CT head, abdomen, pelvis w/ IV and oral contrast, pt on LR @75cc to improve GOYO   - f/u CA 15-3 and CA 27-29   - GI following for diarrhea, will evaluate for possible GI tumor, plan for EGD/colonoscopy  - c/w Dilaudid 2mg po q6 prn. Monitor BMs     #GOYO/HAGMA  #Hypokalemia  #Hypophosphatemia  #Hypercalcemia  - K-dur 20meq po Q2hrs x2  - suspected PTHrP and mulnutrition w/ refeeding syndrome  - Na-Phos 30mmol over 6hs IV 1x today  - Neutraphos d/c pt compl  - Phos q24 until >2 last 1.7 repeat in AM  - iPTH <20, PTHrP 11  - s/p pamidronate 90mg 1x previous admission  - zofran 4mg PO q6hr PRN  - Uric acid 11, c/w allopurinol 300mg PO q12  - possibly 2/2 tumor burden (WBC 50k w/ NT predominance)  - allopurinol 300 BID   - BMP q48hr monitor for K, phos    #leukocytosis and tachycardia - SIRS positive  - suspected in setting of malignancy  - Bcx negative  - monitor off Abx  - BCR/ABL, JAK2, flow cytometry, CALR, MPL mutation all negative  - possible BM Bx OP    #Diarrhea, improving  - gi pcr and c. diff negative  - c/w on loperamide and simethicone     #Alk phos elevation   - RUQ US - cholelithiasis with ductal dilation   - trending 451> 457    # microcytic hypochromic anemia  - Last admission:   iron sat 10%, TIBC 314, Ferr 85 = could be iron def  - FeSO4 325mg po q24  - B12, Fol nl  - hapto 262  - MMA level normal 337  - retic 2.7%; t red 0.3  -  - this is NOT hemolysis, even intramedullary; possible marker for lymphoma or tumor burden    #abnormal LFTs  - RUQ US - GB stones w/out cholecystitis  - no current workup     # antiphospholipid syndrome  - no hx of clots; only 1 miscarriage  - on aspirin  - no a/c    # lupus - not in active flare  - c/w HCQ 200mg po q12   - c/w cymbalta    # HTN  #leg edema  #GOYO  - held home lisinopril 40mg oral daily in setting of GOYO - Cr. 1.5 w/ baseline ~0.8  - nifedipine adjusted to 30mg POD  - lasix 20mg q24hr    # Hypothyroidism  - c/w levothyroxine 150mg oral daily  - TSH 8.61 - f/u Free T4 nl and low T3 - suspected euthyroid sick syndrome    #Misc  - DVT Prophylaxis: hep SQ TID   - Diet: DASH/TLC  - GI Prophylaxis: none  - Activity: bed bound  - IV Fluids: LR @75cc  - Code Status: FULL    Dispo: [facility]     # 56 y/o F with PMHx of lupus, hypothyroidism, antiphospholipid syndrome, HTN, and anemia complaining of pelvic and back pain for 3 weeks with decreased appetite and vomiting for 8 days admitted for progressive generalized weakness and fall.     #Generalized weakness  #Fall  #covid+ prior admission  - h/o general weakness from previous covid infections, reports sxs improve about 1-2weeks after recovery  - 1st covid +ve 7/21 needs 21 days of isolation to 8/11  - s/p paxlovid outpatient  - bed bound pt states too weak to remain standing, doing exercises in bed  - orthostatics -ve  - , -ve  - c/w PT possible d/c to STR    #confirmed malignancy (AdenoCA): b/l adnexal lesions; paraaortic LN; 15+ pulm nodules;   #leukocytosis with neutrophilic predominance  - CT Chest: 15+ pulm nod up to 1.7cm -s/p IR bx of lung nod 7/21. 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  - Pending final path report and IHC  - CT A/P: bilateral adnexal hypodensities, para-aortic lymphadenopathy as well as bilateral pulm nodules  - TVUS: Rt 6.6cm mixed solid-cystic structure; Lt 5.6 cm heterog, solid structure w/ doppler flow   - : 33 (nl); CA 19-9: 19 (nl); CEA 5.5; ; HCG < 0.6; S preg neg; ESR 80; uric acid 6.7 Inhibin A: neg; B: slightly elevated   - Heme/onc and gyn/onc following will start carb/taxol 8/8/22  - CT head, abdomen, pelvis w/ IV and oral contrast completed  - f/u CA 15-3 and CA 27-29   - US breast b/l r/o mass  - GI following for diarrhea, will evaluate for possible GI tumor, plan for EGD/colonoscopy  - c/w Dilaudid 2mg po q6 prn. Monitor BMs     #GOYO/HAGMA  #Hypokalemia  #Hypophosphatemia  #Hypercalcemia  - K 3.5, phos 2.3  - Phosphate supp d/c 2/2 diarrhea   - suspected PTHrP and mulnutrition w/ refeeding syndrome  - Neutraphos d/c pt compl  - Phos q24 until >2 last 1.7 repeat in AM  - iPTH <20, PTHrP 11  - s/p pamidronate 90mg 1x previous admission  - zofran 4mg PO q6hr PRN  - Uric acid 4.5 >11, adjusted allopurinol 300mg po 24  - possibly 2/2 tumor burden (WBC 50k w/ NT predominance)  - allopurinol 300 BID   - BMP q48hr w/ phos    #leukocytosis and tachycardia - SIRS positive  - suspected in setting of malignancy  - Bcx negative  - monitor off Abx  - BCR/ABL, JAK2, flow cytometry, CALR, MPL mutation all negative  - possible BM Bx OP    #Diarrhea, improving  - gi pcr and c. diff negative  - c/w on loperamide and simethicone     #Alk phos elevation   - RUQ US - cholelithiasis with ductal dilation, GB stones w/o cholecystitis    # microcytic hypochromic anemia  - Last admission:   iron sat 10%, TIBC 314, Ferr 85 = could be iron def  - FeSO4 325mg po q24  - B12, Fol nl  - hapto 262  - MMA level normal 337  - retic 2.7%; t red 0.3  -  - this is NOT hemolysis, even intramedullary; possible marker for lymphoma or tumor burden    #abnormal LFTs  - RUQ US - GB stones w/out cholecystitis  - no current workup     # antiphospholipid syndrome  - no hx of clots; only 1 miscarriage  - on aspirin  - no a/c    # lupus - not in active flare  - c/w HCQ 200mg po q12   - c/w cymbalta    # HTN  #leg edema  #GOYO  - held home lisinopril 40mg oral daily in setting of GOYO - Cr. 1.5 w/ baseline ~0.8  - nifedipine adjusted to 30mg POD  - lasix 20mg q24hr    # Hypothyroidism  - c/w levothyroxine 150mg oral daily  - TSH 8.61 - f/u Free T4 nl and low T3 - suspected euthyroid sick syndrome    #Misc  - DVT Prophylaxis: hep SQ TID   - Diet: DASH/TLC  - GI Prophylaxis: none  - Activity: bed bound  - IV Fluids: off  - Code Status: FULL    Dispo: [facility]

## 2022-08-07 NOTE — PROGRESS NOTE ADULT - ASSESSMENT
Pt is a 56 y/o F with PMHx of lupus, hypothyroidism, antiphospholipid syndrome, HTN, and anemia who presented to the ER complaining of diffuse pelvic pain and back pain x 3 weeks associated with decreased appetite and vomiting over the last 8 days.    # COVID + on last admit  was exposed 7/16 to another pt in hosp w/ COVID;  also had COVID  1st COVID + 7/21 -> 21 days of isolation -> to 8/11  pt s/p Paxlovid as outpt recently  seems asymp, except altered taste and smell  NOT on O2   this is NOT why WBC high    # diarrhea - not sure why - poss irritation from tumor vs other; metab acidosis  C Diff neg; GI pcr neg  imodium 2mg po q4 for now (not prn) - helping  d/c neutraphos pkts - pt says this is contributing to diarrhea  incr NaHCO3 650mg po q8  d/c NS (can cause acidosis)  GI eval    # leg edema; HTN  lisinopril off (GOYO)  lasix 20mg po q24  d/c Nifed - BP getting on low side    # hypokalemia  might worsen w/ combo of lasix and diarrhea - will watch for this    # hypophosphatemia  prob effect of PTHRP and malnutrition w/ refeeding syndrome  pt refusing neutraphos pkt 2/2 diarrhea  give dairy (milk) or meat to incr dietary phos    # hypercalcemia - much better now  iPTH < 20 -> hyperpara RULED OUT  likely malign in origin - PTHrP 11 (nl <2) and + adenoCa on bx (PTHrP is secreted by many tumors - ie, not specific)  s/p Pamidronate 90mg iv x1 last admit  25 OH Vit D 74 (upper limit of nl)    # confirmed malignancy (adenoCa of Mullerian Origin?): b/l adnexal lesions (primary vs metastatic ([place])); paraaortic LN; 15+ pulm nodules;   Primary Site: hard to say; distribution of path, symptoms, tumor markers do NOT lead to a clear primary site  GI eval: might do upper/lower endo this week  CT C: 15+ pulm nod up to 1.7cm - IR s/p bx of lung nod 7/21  f/u path: + adenoCa, Mullerian Origin (?)  CT A/P: bilateral adnexal hypodensities, para-aortic lymphadenopathy as well as bilateral pulm nodules  TVUS: Rt 6.6 cm mixed solid-cystic structure; Lt 5.6 cm heterog, solid structure w/ doppler flow   H/O: will start carb/taxol héctor  Gyn/Onc noted  : 33 (nl); CA 19-9: 19 (nl); CEA 5.5; ; HCG < 0.6; S preg neg; ESR 80;  uric acid 6.7 -> 11.5 -> 4.5 (prob cell turnover) - decr allopurinol 300mg po q24 (f/u h/o)   Inhibin A: neg; B: slightly elevated   f/u CA 15-3 and CA 27-29  spoke w/ MRI, pt does NOT fit into MRI gantry, thus cannot do MRI-B nor MRI pelvis  CT Head w/ IV contrast (r/o brain mets): done; await read  GYN/ONC req: obtain rpt CT A/P w/ oral and IV cont: done; await read  IVFs: d/c  will hold off on mammogram (logistically too hard to obtain in obese pt who cannot stand and machine in another building)  ordered US breast b/l to r/o mass  percocet 5/325mg po q4 prn neopl-related pain     # marked leukocytosis and tachycardia = SIRS 2/2 malign  prob reactive   bld cx all neg   no abx for now  BCR/ABL neg; JAK2 neg; flow cyto neg; CALR neg; MPL mut analysis neg   cbc NOT needed daily    # microcytic anemia  prob of malign  iron sat 10%, TIBC 314, Ferr 85 = could be iron def  no IV iron for now given SIRS  FeSO4 325mg po q24  GI eval  B12, Fol nl  hapto 262   (upper nl)  retic 2.7%; t red 0.3   - this is NOT hemolysis, even intramedullary; I suspect this is a marker for malign or cell turnover    # minor GOYO - prob dehydration;  Renal U/S: no hydro  BMP q48    # prior thrombocytosis - now better    # abnl LFTs mild (incr AP and slight incr AST)  prob related to underlying malign  RUQ U/S: GB stones w/out cholecystitis  no further w/u for now  no need to follow LFTs    # lupus - does not seem active currently  c/w HCQ 200mg po q12  c/w cymbalta    # antiphospholipid syndrome  prob just element of lupus hx  no clots; only 1 miscarriage  cont asa   no a/c    # Hypothyroidism  c/w levothyroxine 150mg oral daily  TSH 8.61 - would NOT raise dose given tachycardia  free T4 nl and free T3 low (prob sick euthyroid)    # no hx of DM  d/c all FS - they are always nl    # DVT ppx: LMWH (d/c hep sc); Cr better    # GI ppx: none    # Activity: used to walk and cannot now; s/p fall at home; PT eval    # Code: full    Dispo: f/u GI eval; f/u H/O re chemo; f/u CT H; f/u CT A/P; f/u breast tumor markers; f/u breast US; d/c IVFs; tx pain; tx diarrhea; decr allopurinol; c/w lasix  eventually, pt will need STR, either 4A or SNF vs home - not ready for d/c yet    Prog is very guarded.

## 2022-08-07 NOTE — CHART NOTE - NSCHARTNOTEFT_GEN_A_CORE
called by US tech that they do not do US of breast inpatient to r/o breast mass (only to rule out abscess) and that it is done at breast clinic  spoke with [id number] and she states that this is an o/p exam and as far as she knows can't be ordered inpatient; and regardless the breast clinic is closed on Sunday  exam was cancelled by performing department at this time

## 2022-08-07 NOTE — PROGRESS NOTE ADULT - SUBJECTIVE AND OBJECTIVE BOX
KELSIE JUAREZ  57y  Female  ***My note supersedes ALL resident notes that I sign.  My corrections for their notes are in my note.***    I can be reached directly on [id number]. My office number is [phone]. My personal cell number is [phone].    INTERVAL EVENTS: Here for f/u of cancer. Pt wanted to hold off on chemo because her mom  from lung cancer on . I told her that she should proceed w/ chemo sooner than later, but this was btw H/O and her. Diarrhea remains controlled. Pain controlled. Pt is exercising in bed. Pt is eating/drinking to decent extent.    T(F): 96.5 (22 @ 05:51), Max: 97.1 (22 @ 20:12)  HR: 95 (22 @ 05:51) (95 - 95)  BP: 114/56 (22 @ 05:51) (114/56 - 119/60)  RR: 18 (22 @ 05:51) (18 - 18)  SpO2: --    Gen: NAD  HEENT: PERRL, EOMI, mouth clr, nose clr  Neck: no nodes, no JVD, thyroid nl  lungs: clr  hrt: s1 s2 reg, tachy (HR 90 on my exam), no murmur  abd: soft, obese, ND, mildly tender lower abd; no HS megaly; hard to eval for mass given body habitus  ext: 1+ edema, no c/c  neuro: aa ox3, cn intact, can move all 4 ext, but legs very weak    LABS:                      8.4     (    76.4   58.76 )-----------( ---------      310      ( 07 Aug 2022 08:59 )             25.2    (    22.4     Hemoglobin: 8.4 g/dL ( @ 08:59)  Hemoglobin: 8.6 g/dL ( @ 08:48)  Hemoglobin: 9.1 g/dL ( @ 09:28)    137   (   100   (   65      22 @ 08:59  ----------------------               3.5   (   18   (   23                             -----                        1.0  Ca  7.6   Mg  --    P   2.3     135   (   98   (   43      22 @ 16:00  ----------------------               3.5   (   18   (   24                             -----                        1.1  Ca  7.7   Mg  --    P   1.7     CAPILLARY BLOOD GLUCOSE  POCT Blood Glucose.: 74 (22 @ 19:27)  POCT Blood Glucose.: 84 (22 @ 10:22)  POCT Blood Glucose.: 104 (22 @ 12:22)    Culture - Blood (collected 22 @ 12:13)  Source: .Blood None  Preliminary Report (22 @ 23:01):    No growth to date.    Culture - Blood (collected 22 @ 06:38)  Source: .Blood None  Final Report (22 @ 18:00):    No Growth Final    RADIOLOGY & ADDITIONAL TESTS:    MEDICATIONS:  hydroxychloroquine 200 milliGRAM(s) Oral two times a day    allopurinol 300 milliGRAM(s) Oral two times a day  aspirin  chewable 81 milliGRAM(s) Oral daily  DULoxetine 60 milliGRAM(s) Oral daily  ferrous    sulfate 325 milliGRAM(s) Oral daily  furosemide    Tablet 20 milliGRAM(s) Oral daily  heparin   Injectable 5000 Unit(s) SubCutaneous three times a day  levothyroxine 150 MICROGram(s) Oral daily  loperamide 2 milliGRAM(s) Oral every 4 hours  nystatin Powder 1 Application(s) Topical two times a day  oxycodone    5 mG/acetaminophen 325 mG 2 Tablet(s) Oral every 4 hours PRN  simethicone 80 milliGRAM(s) Chew four times a day PRN  sodium bicarbonate 650 milliGRAM(s) Oral every 12 hours  sodium chloride 0.9%. 1000 milliLiter(s) IV Continuous <Continuous>

## 2022-08-08 NOTE — PROGRESS NOTE ADULT - SUBJECTIVE AND OBJECTIVE BOX
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   No acute overnight events.     OBJECTIVE  PAST MEDICAL & SURGICAL HISTORY  Lupus    Hypothyroidism    Iron deficiency anemia    No significant past surgical history      ALLERGIES:  NSAIDs (Stomach Upset)    MEDICATIONS:  STANDING MEDICATIONS  allopurinol 300 milliGRAM(s) Oral daily  aspirin  chewable 81 milliGRAM(s) Oral daily  dextrose 5%. 1000 milliLiter(s) IV Continuous <Continuous>  dextrose 5%. 1000 milliLiter(s) IV Continuous <Continuous>  dextrose 50% Injectable 25 Gram(s) IV Push once  dextrose 50% Injectable 12.5 Gram(s) IV Push once  dextrose 50% Injectable 25 Gram(s) IV Push once  DULoxetine 60 milliGRAM(s) Oral daily  enoxaparin Injectable 40 milliGRAM(s) SubCutaneous every 24 hours  ferrous    sulfate 325 milliGRAM(s) Oral daily  furosemide    Tablet 20 milliGRAM(s) Oral daily  glucagon  Injectable 1 milliGRAM(s) IntraMuscular once  hydroxychloroquine 200 milliGRAM(s) Oral two times a day  levothyroxine 150 MICROGram(s) Oral daily  loperamide 2 milliGRAM(s) Oral every 4 hours  nystatin Powder 1 Application(s) Topical two times a day  sodium bicarbonate 650 milliGRAM(s) Oral every 8 hours    PRN MEDICATIONS  dextrose Oral Gel 15 Gram(s) Oral once PRN  oxycodone    5 mG/acetaminophen 325 mG 2 Tablet(s) Oral every 4 hours PRN  simethicone 80 milliGRAM(s) Chew four times a day PRN    HOME MEDICATIONS  Home Medications:  aspirin 81 mg oral tablet, chewable: 1 tab(s) orally once a day (22 Jul 2022 13:38)  DULoxetine 60 mg oral delayed release capsule: 1 cap(s) orally once a day (14 Jul 2022 22:46)  hydroxychloroquine 200 mg oral tablet: 1 tab(s) orally 2 times a day (14 Jul 2022 22:44)  lisinopril 40 mg oral tablet: 1 tab(s) orally once a day (14 Jul 2022 22:44)  oxycodone-acetaminophen 5 mg-325 mg oral tablet: 1 tab(s) orally every 4 hours, As needed, Moderate Pain (4 - 6) (22 Jul 2022 13:38)  Synthroid 150 mcg (0.15 mg) oral tablet: 1 tab(s) orally once a day (14 Jul 2022 22:46)      VITAL SIGNS: Last 24 Hours  T(C): 35.8 (08 Aug 2022 06:25), Max: 36.2 (07 Aug 2022 14:50)  T(F): 96.5 (08 Aug 2022 06:25), Max: 97.1 (07 Aug 2022 14:50)  HR: 102 (08 Aug 2022 06:25) (98 - 104)  BP: 127/58 (08 Aug 2022 06:25) (118/60 - 128/59)  BP(mean): --  RR: 18 (08 Aug 2022 06:25) (18 - 18)  SpO2: --      LABS:                        8.4    58.76 )-----------( 310      ( 07 Aug 2022 08:59 )             25.2     08-07    137  |  100  |  23<H>  ----------------------------<  65<L>  3.5   |  18  |  1.0    Ca    7.6<L>      07 Aug 2022 08:59  Phos  2.3     08-07                      CAPILLARY BLOOD GLUCOSE      POCT Blood Glucose.: 74 mg/dL (06 Aug 2022 19:27)      RADIOLOGY:    PHYSICAL EXAM:  PHYSICAL EXAM:  GENERAL: NAD, AAO x 4, 57y F  HEAD:  Atraumatic, Normocephalic  EYES: EOMI, conjunctivae clear and sclerae white  NECK: Supple, No JVD  CHEST/LUNG: Clear to auscultation bilaterally; No wheeze; No crackles; No accessory muscles used  HEART: Regular rate and rhythm; No murmurs;   ABDOMEN: Soft, Nontender, Nondistended; Bowel sounds present; No guarding  EXTREMITIES:  2+ Peripheral Pulses, No cyanosis or edema  NEUROLOGY: non-focal    ADMISSION SUMMARY  Patient is a 57y old Female who presents with a 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

## 2022-08-08 NOTE — PROGRESS NOTE ADULT - SUBJECTIVE AND OBJECTIVE BOX
KELSIE JUAREZ  57y  Female  ***My note supersedes ALL resident notes that I sign.  My corrections for their notes are in my note.***    I can be reached directly on [email or web address]. My office number is [phone]. My personal cell number is [phone].    INTERVAL EVENTS: Here for f/u of cancer. Pt does not eat much food, but does drink enough. She tends to eat more "junk" food. I told her that she needs to eat more nutritious meals, carmencita as chemo is about to start. Diarrhea is controlled, but still needs imodium. Pain is controlled, but pt is getting to drowsy w/ 10mg oxycodone, so she asked for it to be reduced.    T(F): 96.5 (08-08-22 @ 06:25), Max: 97.1 (08-07-22 @ 14:50)  HR: 102 (08-08-22 @ 06:25) (98 - 104)  BP: 127/58 (08-08-22 @ 06:25) (118/60 - 128/59)  RR: 18 (08-08-22 @ 06:25) (18 - 18)  SpO2: --    Gen: NAD  HEENT: PERRL, EOMI, mouth clr, nose clr  Neck: no nodes, no JVD, thyroid nl  lungs: clr  hrt: s1 s2 reg, tachy (HR 90 on my exam), no murmur  abd: soft, obese, ND, mildly tender lower abd; no HS megaly; hard to eval for mass given body habitus  ext: 1+ edema, no c/c  neuro: aa ox3, cn intact, can move all 4 ext, but legs very weak    LABS:                      8.4     (    76.4   58.76 )-----------( ---------      310      ( 07 Aug 2022 08:59 )             25.2    (    22.4     CAPILLARY BLOOD GLUCOSE  POCT Blood Glucose.: 74 (08-06-22 @ 19:27)    Culture - Blood (collected 08-02-22 @ 12:13)  Source: .Blood None  Final Report (08-07-22 @ 23:00):    No Growth Final    RADIOLOGY & ADDITIONAL TESTS:  < from: CT Abdomen and Pelvis w/ Oral Cont (08.06.22 @ 16:58) >  IMPRESSION:    1. Limited exam as described above demonstrates multiple bibasilar   pulmonary nodules which have overall increased in size and number,   suspicious for pulmonary metastases.    2. Increased conglomerate retroperitoneal adenopathy.    3. Evaluation of the pelvic organs is markedly limited by artifact   related to patient length contacting the CT gantry; confluent hypodensity   again noted within the pelvis, poorly evaluated.    < end of copied text >    < from: CT Head No Cont (08.06.22 @ 16:57) >  IMPRESSION:  No acute intracranial pathology. No evidence of midline shift, mass   effect or intracranial hemorrhage.    Please note evaluation for intracranial metastatic disease is limited   without intravenous contrast. A postcontrast brain MRI is recommended for   further evaluation if not clinically contraindicated.    < end of copied text >    MEDICATIONS:  hydroxychloroquine 200 milliGRAM(s) Oral two times a day    allopurinol 300 milliGRAM(s) Oral daily  aspirin  chewable 81 milliGRAM(s) Oral daily  CARBOplatin IVPB (eMAR) 750 milliGRAM(s) IV Intermittent once  dexAMETHasone  IVPB 12 milliGRAM(s) IV Intermittent once  diphenhydrAMINE IVPB 25 milliGRAM(s) IV Intermittent once  DULoxetine 60 milliGRAM(s) Oral daily  enoxaparin Injectable 40 milliGRAM(s) SubCutaneous every 24 hours  famotidine  IVPB 20 milliGRAM(s) IV Intermittent once  ferrous    sulfate 325 milliGRAM(s) Oral daily  fosaprepitant IVPB 150 milliGRAM(s) IV Intermittent once  furosemide    Tablet 20 milliGRAM(s) Oral daily  levothyroxine 150 MICROGram(s) Oral daily  loperamide 2 milliGRAM(s) Oral every 4 hours  nystatin Powder 1 Application(s) Topical two times a day  oxycodone    5 mG/acetaminophen 325 mG 2 Tablet(s) Oral every 4 hours PRN  PACLitaxel IVPB (eMAR) 458 milliGRAM(s) IV Intermittent once  simethicone 80 milliGRAM(s) Chew four times a day PRN  sodium bicarbonate 650 milliGRAM(s) Oral every 8 hours

## 2022-08-08 NOTE — PROGRESS NOTE ADULT - SUBJECTIVE AND OBJECTIVE BOX
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     PAST MEDICAL & SURGICAL HISTORY:  Lupus      Hypothyroidism      Iron deficiency anemia      No significant past surgical history          MEDICATIONS  (STANDING):  allopurinol 300 milliGRAM(s) Oral daily  aspirin  chewable 81 milliGRAM(s) Oral daily  CARBOplatin IVPB (eMAR) 750 milliGRAM(s) IV Intermittent once  dextrose 50% Injectable 12.5 Gram(s) IV Push once  dextrose 50% Injectable 25 Gram(s) IV Push once  DULoxetine 60 milliGRAM(s) Oral daily  enoxaparin Injectable 40 milliGRAM(s) SubCutaneous every 24 hours  ferrous    sulfate 325 milliGRAM(s) Oral daily  furosemide    Tablet 20 milliGRAM(s) Oral daily  glucagon  Injectable 1 milliGRAM(s) IntraMuscular once  hydroxychloroquine 200 milliGRAM(s) Oral two times a day  levothyroxine 150 MICROGram(s) Oral daily  loperamide 2 milliGRAM(s) Oral every 4 hours  nystatin Powder 1 Application(s) Topical two times a day  sodium bicarbonate 650 milliGRAM(s) Oral every 8 hours    MEDICATIONS  (PRN):  dextrose Oral Gel 15 Gram(s) Oral once PRN Blood Glucose LESS THAN 70 milliGRAM(s)/deciliter  oxyCODONE    IR 5 milliGRAM(s) Oral every 4 hours PRN Moderate Pain (4 - 6)  oxyCODONE    IR 7.5 milliGRAM(s) Oral every 4 hours PRN Severe Pain (7 - 10)  simethicone 80 milliGRAM(s) Chew four times a day PRN Gas      Allergies  NSAIDs (Stomach Upset)      Review of Systems:   Cardiovascular:  No Chest Pain, No Palpitations  Respiratory:  No Cough, No Dyspnea  Gastrointestinal:  As described in HPI  Skin:  No Skin Lesions, No Jaundice  Neuro:  No Syncope, No Dizziness    Physical Examination:  T(C): 37.1 (08-08-22 @ 19:58), Max: 37.1 (08-08-22 @ 19:58)  HR: 104 (08-08-22 @ 19:58) (102 - 109)  BP: 123/63 (08-08-22 @ 19:58) (120/57 - 131/59)  RR: 18 (08-08-22 @ 19:58) (18 - 18)  SpO2: 98% (08-08-22 @ 18:45) (98% - 98%)        GENERAL: AAOx3, no acute distress.  HEAD:  Atraumatic, Normocephalic  EYES: conjunctiva and sclera clear  NECK: Supple, no JVD or thyromegaly  CHEST/LUNG: Clear to auscultation bilaterally; No wheeze, rhonchi, or rales  HEART: Regular rate and rhythm; normal S1, S2, No murmurs.  ABDOMEN: Soft, nontender, nondistended; Bowel sounds present  NEUROLOGY: No asterixis or tremor.   SKIN: Intact, no jaundice     Data:                        8.4    58.76 )-----------( 310      ( 07 Aug 2022 08:59 )             25.2     Hgb trend:  8.4  08-07-22 @ 08:59      08-07    137  |  100  |  23<H>  ----------------------------<  65<L>  3.5   |  18  |  1.0    Ca    7.6<L>      07 Aug 2022 08:59  Phos  2.3     08-07      Liver panel trend:  TBili 0.7   /   AST 50   /   ALT 25   /   AlkP 451   /   Tptn 5.7   /   Alb 2.8    /   DBili --      08-01  TBili 0.5   /   AST 58   /   ALT 26   /   AlkP 457   /   Tptn 5.9   /   Alb 3.0    /   DBili --      07-31  TBili 0.5   /   AST 73   /   ALT 27   /   AlkP 378   /   Tptn 6.4   /   Alb 3.4    /   DBili --      07-30             Radiology:       Gastroenterology progress note:     Patient is a 57y old  Female who presents with a chief complaint of weakness, diarrhea, falls (08 Aug 2022 13:42)  Admitted on: 07-30-22    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     PAST MEDICAL & SURGICAL HISTORY:  Lupus      Hypothyroidism      Iron deficiency anemia      No significant past surgical history          MEDICATIONS  (STANDING):  allopurinol 300 milliGRAM(s) Oral daily  aspirin  chewable 81 milliGRAM(s) Oral daily  CARBOplatin IVPB (eMAR) 750 milliGRAM(s) IV Intermittent once  dextrose 50% Injectable 12.5 Gram(s) IV Push once  dextrose 50% Injectable 25 Gram(s) IV Push once  DULoxetine 60 milliGRAM(s) Oral daily  enoxaparin Injectable 40 milliGRAM(s) SubCutaneous every 24 hours  ferrous    sulfate 325 milliGRAM(s) Oral daily  furosemide    Tablet 20 milliGRAM(s) Oral daily  glucagon  Injectable 1 milliGRAM(s) IntraMuscular once  hydroxychloroquine 200 milliGRAM(s) Oral two times a day  levothyroxine 150 MICROGram(s) Oral daily  loperamide 2 milliGRAM(s) Oral every 4 hours  nystatin Powder 1 Application(s) Topical two times a day  sodium bicarbonate 650 milliGRAM(s) Oral every 8 hours    MEDICATIONS  (PRN):  dextrose Oral Gel 15 Gram(s) Oral once PRN Blood Glucose LESS THAN 70 milliGRAM(s)/deciliter  oxyCODONE    IR 5 milliGRAM(s) Oral every 4 hours PRN Moderate Pain (4 - 6)  oxyCODONE    IR 7.5 milliGRAM(s) Oral every 4 hours PRN Severe Pain (7 - 10)  simethicone 80 milliGRAM(s) Chew four times a day PRN Gas      Allergies  NSAIDs (Stomach Upset)      Review of Systems:   Cardiovascular:  No Chest Pain, No Palpitations  Respiratory:  No Cough, No Dyspnea  Gastrointestinal:  As described in HPI  Skin:  No Skin Lesions, No Jaundice  Neuro:  No Syncope, No Dizziness    Physical Examination:  T(C): 37.1 (08-08-22 @ 19:58), Max: 37.1 (08-08-22 @ 19:58)  HR: 104 (08-08-22 @ 19:58) (102 - 109)  BP: 123/63 (08-08-22 @ 19:58) (120/57 - 131/59)  RR: 18 (08-08-22 @ 19:58) (18 - 18)  SpO2: 98% (08-08-22 @ 18:45) (98% - 98%)        GENERAL: AAOx3, no acute distress.  HEAD:  Atraumatic, Normocephalic  EYES: conjunctiva and sclera clear  NECK: Supple, no JVD or thyromegaly  CHEST/LUNG: Clear to auscultation bilaterally; No wheeze, rhonchi, or rales  HEART: Regular rate and rhythm; normal S1, S2, No murmurs.  ABDOMEN: Soft, nontender, nondistended; Bowel sounds present  NEUROLOGY: No asterixis or tremor.   SKIN: Intact, no jaundice     Data:                        8.4    58.76 )-----------( 310      ( 07 Aug 2022 08:59 )             25.2     Hgb trend:  8.4  08-07-22 @ 08:59      08-07    137  |  100  |  23<H>  ----------------------------<  65<L>  3.5   |  18  |  1.0    Ca    7.6<L>      07 Aug 2022 08:59  Phos  2.3     08-07      Liver panel trend:  TBili 0.7   /   AST 50   /   ALT 25   /   AlkP 451   /   Tptn 5.7   /   Alb 2.8    /   DBili --      08-01  TBili 0.5   /   AST 58   /   ALT 26   /   AlkP 457   /   Tptn 5.9   /   Alb 3.0    /   DBili --      07-31  TBili 0.5   /   AST 73   /   ALT 27   /   AlkP 378   /   Tptn 6.4   /   Alb 3.4    /   DBili --      07-30       Radiology:      < from: CT Abdomen and Pelvis w/ Oral Cont (08.06.22 @ 16:58) >  1. Limited exam as described above demonstrates multiple bibasilar   pulmonary nodules which have overall increasedin size and number,   suspicious for pulmonary metastases.    2. Increased conglomerate retroperitoneal adenopathy.    3. Evaluation of the pelvic organs is markedly limited by artifact   related to patient length contacting the CT gantry; confluent hypodensity   again noted within the pelvis, poorly evaluated.    < end of copied text >  < from: CT Head No Cont (08.06.22 @ 16:57) >  No acute intracranial pathology. No evidence of midline shift, mass   effect or intracranial hemorrhage.    Please note evaluation for intracranial metastatic disease is limited   without intravenous contrast. A postcontrast brain MRI is recommended for   further evaluation if not clinically contraindicated.    < end of copied text >  < from: US Abdomen Upper Quadrant Right (08.01.22 @ 12:33) >  Cholelithiasis without ductal dilatation.    < end of copied text >

## 2022-08-08 NOTE — PROGRESS NOTE ADULT - ASSESSMENT
Patient is a 58 y/o F with a pmhx of lupus, hypothyroidism, antiphospholipid syndrome, HTN, anemia,  recently diagnosed  with  b/l adnexal masses and lung nodules, unknown primary disease (7/2022), presented with weakness and falls and admitted.      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  #Bilateral adnexal masses with para-aortic lymphadenopathy and pulmonary nodules suggestive of metastatic disease.  #Biopsy of Left upper lobe Lung nodule- Adenocarcinoma of mullerian origin.  PDL-1 and NGS pending.    -Ca 19-9, Ca125 normal  -CEA 5.5  -CA 15.3 and CA 27.29 pending.  -US Transvaginal : right 6.6 cm heterogeneous mixed solid-cystic mass and lower pelvic 5.6 cm heterogenous solid appearing may represent ovarian mass or degenerating fibroid.  -CT A/P W contrast 7/2022: B/l confluent hypodensities in b/l adnexae measuring 6.8 cm x 4.5 cm, paraaortic lymphadenopathy and b/l solid pulmonary nodules largest measuring 8mm.  -Pathology of Left upper lobe lung nodule :  Adenocarcinoma of mullerian origin adenocarcinoma, morphologically consistent with endometrioid adenocarcinoma. The possible primary sites include ovary, uterus or the peritoneum. While GATA3 staining can be seen in small percentage of endometrioid carcinoma, clinical correlation to rule out the possibility of breast carcinoma is recommended.    -PDL1 and NGS pending.  -Although the primary may be ovarian, however breast or other etiologies should be considered given the normal Ca 125. Apparently, the breast examination has been negative.  -MRI of head w contrast : unable to be obtained as pt is too big.  -CT head without contrast : No metastasis noted. However done without contrast.  -OBGYN recs repeat imaging of CT A/P with contrast and pathology for second opinion of pathology specimen.  -OBGYN recs no surgical intervention for now.  -GI was consulted due to elevated CEA.   --Unable to obtain of B/L breasts diagnostic mammogram as pt is unable to stand.     # Leukocytosis and thrombocytosis likely reactive secondary to underlying malignancy  - Peripheral flow Normal.  - BCR-ABL, Jak2, CALR, MPL Negative for any mutations.    #Microcytic anemia:  -Received 5 doses of Venofer for Iron deficiency anemia in 7/2022 during prior admission.    Recommendations.    -Will start on chemotherapy with carboplatin and taxol cycle 1 on 8/8/2022.  -Pt was clearly explained the benefits and risks of chemotherapy.  -Pt agreeable to start chemotherapy.     -The side effects from chemotherapy have been discussed including but not limited to: Pain, hyponatremia, hypomagnesemia, hypocalcemia, hypokalemia, vomiting, abdominal pain, nausea and vomiting, bone marrow depression, anemia, leukopenia, neutropenia, thrombocytopenia, increased serum alkaline phosphatase, increased serum AST, hypersensitivity, weakness, decreased creatinine clearance, increased blood urea nitrogen; flushing, ECG abnormality, edema, hypotension, alopecia, skin rash, diarrhea, stomatitis, hemorrhage, hypersensitivity reaction, infection, injection site reaction, peripheral neuropathy, arthralgia, myalgia, asthenia, fever.    The chemotherapy dose was adjusted according to pt's height, weight, labs and anticipated tolerance.  The high risk of complications and complexity associated with antineoplastic therapy administration has been explained to the pt.    -Further recommendations for treatment after the above issues clarified. Patient is a 56 y/o F with a pmhx of lupus, hypothyroidism, antiphospholipid syndrome, HTN, anemia,  recently diagnosed  with  b/l adnexal masses and lung nodules, unknown primary disease (7/2022), presented with weakness and falls and admitted.      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  #Bilateral adnexal masses with para-aortic lymphadenopathy and pulmonary nodules suggestive of metastatic disease.  #Biopsy of Left upper lobe Lung nodule- Adenocarcinoma of mullerian origin.  PDL-1 and NGS pending.    -Ca 19-9, Ca125 normal  -CEA 5.5  -CA 15.3 and CA 27.29 pending.  -US Transvaginal : right 6.6 cm heterogeneous mixed solid-cystic mass and lower pelvic 5.6 cm heterogenous solid appearing may represent ovarian mass or degenerating fibroid.  -CT A/P W contrast 7/2022: B/l confluent hypodensities in b/l adnexae measuring 6.8 cm x 4.5 cm, paraaortic lymphadenopathy and b/l solid pulmonary nodules largest measuring 8mm.  -Pathology of Left upper lobe lung nodule :  Adenocarcinoma of mullerian origin adenocarcinoma, morphologically consistent with endometrioid adenocarcinoma. The possible primary sites include ovary, uterus or the peritoneum. While GATA3 staining can be seen in small percentage of endometrioid carcinoma, clinical correlation to rule out the possibility of breast carcinoma is recommended.    -PDL1 and NGS pending.  -Although the primary may be ovarian, however breast or other etiologies should be considered given the normal Ca 125. Apparently, the breast examination has been negative.  -MRI of head w contrast : unable to be obtained as pt is too big.  -CT head without contrast : No metastasis noted. However done without contrast.  -OBGYN recs repeat imaging of CT A/P with contrast and pathology for second opinion of pathology specimen.  -OBGYN recs no surgical intervention for now.  -GI was consulted due to elevated CEA.   --Unable to obtain of B/L breasts diagnostic mammogram as pt is unable to stand.     # Leukocytosis and thrombocytosis likely reactive secondary to underlying malignancy  - Peripheral flow Normal.  - BCR-ABL, Jak2, CALR, MPL Negative for any mutations.    #Microcytic anemia:  -Received 5 doses of Venofer for Iron deficiency anemia in 7/2022 during prior admission.    #Covid Pneumonia   h/o general weakness from previous covid infections, reports sxs improve about 1-2weeks after recovery  - 1st covid +ve 7/21 needs 21 days of isolation to 8/11  - s/p paxlovid as outpatient.    Recommendations.    -Will start on chemotherapy with carboplatin and taxol cycle 1 on 8/8/2022.  -Pt was clearly explained the benefits and risks of chemotherapy.  -Pt agreeable to start chemotherapy.   -monitor CBC and cmp tomorrow.    -The side effects from chemotherapy have been discussed including but not limited to: Pain, hyponatremia, hypomagnesemia, hypocalcemia, hypokalemia, vomiting, abdominal pain, nausea and vomiting, bone marrow depression, anemia, leukopenia, neutropenia, thrombocytopenia, increased serum alkaline phosphatase, increased serum AST, hypersensitivity, weakness, decreased creatinine clearance, increased blood urea nitrogen; flushing, ECG abnormality, edema, hypotension, alopecia, skin rash, diarrhea, stomatitis, hemorrhage, hypersensitivity reaction, infection, injection site reaction, peripheral neuropathy, arthralgia, myalgia, asthenia, fever.    The chemotherapy dose was adjusted according to pt's height, weight, labs and anticipated tolerance.  The high risk of complications and complexity associated with antineoplastic therapy administration has been explained to the pt.    -Further recommendations for treatment after the above issues clarified.

## 2022-08-08 NOTE — PROGRESS NOTE ADULT - SUBJECTIVE AND OBJECTIVE BOX
KELSIE JUAREZ 57y Female  MRN#: [id number]   Hospital Day: 9d    SUBJECTIVE  Patient is a 57y old Female who presents with a 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  Currently admitted to medicine with the primary diagnosis of GOYO (acute kidney injury)      INTERVAL HPI AND OVERNIGHT EVENTS:  Patient was examined and seen at bedside. This morning she is resting in bed reporting having some episodes of diarrhea last night. She also stated her percocet dose was too strong and is asking for it to be reduced. The patient is hopeful to start chemotherapy today as planned. She had her cousin help set up a bar above her bed to continue with PT exercises while she remains bed bound. No events overnight, vitals stable.       OBJECTIVE  PAST MEDICAL & SURGICAL HISTORY  Lupus    Hypothyroidism    Iron deficiency anemia    No significant past surgical history      ALLERGIES:  NSAIDs (Stomach Upset)    MEDICATIONS:  STANDING MEDICATIONS  allopurinol 300 milliGRAM(s) Oral daily  aspirin  chewable 81 milliGRAM(s) Oral daily  CARBOplatin IVPB (eMAR) 750 milliGRAM(s) IV Intermittent once  dexAMETHasone  IVPB 12 milliGRAM(s) IV Intermittent once  dextrose 50% Injectable 12.5 Gram(s) IV Push once  dextrose 50% Injectable 25 Gram(s) IV Push once  diphenhydrAMINE IVPB 25 milliGRAM(s) IV Intermittent once  DULoxetine 60 milliGRAM(s) Oral daily  enoxaparin Injectable 40 milliGRAM(s) SubCutaneous every 24 hours  famotidine  IVPB 20 milliGRAM(s) IV Intermittent once  ferrous    sulfate 325 milliGRAM(s) Oral daily  fosaprepitant IVPB 150 milliGRAM(s) IV Intermittent once  furosemide    Tablet 20 milliGRAM(s) Oral daily  glucagon  Injectable 1 milliGRAM(s) IntraMuscular once  hydroxychloroquine 200 milliGRAM(s) Oral two times a day  levothyroxine 150 MICROGram(s) Oral daily  loperamide 2 milliGRAM(s) Oral every 4 hours  nystatin Powder 1 Application(s) Topical two times a day  PACLitaxel IVPB (eMAR) 458 milliGRAM(s) IV Intermittent once  sodium bicarbonate 650 milliGRAM(s) Oral every 8 hours    PRN MEDICATIONS  dextrose Oral Gel 15 Gram(s) Oral once PRN  oxyCODONE    IR 5 milliGRAM(s) Oral every 4 hours PRN  oxyCODONE    IR 7.5 milliGRAM(s) Oral every 4 hours PRN  simethicone 80 milliGRAM(s) Chew four times a day PRN      VITAL SIGNS: Last 24 Hours  T(C): 36.1 (08 Aug 2022 13:34), Max: 36.2 (07 Aug 2022 14:50)  T(F): 97 (08 Aug 2022 13:34), Max: 97.1 (07 Aug 2022 14:50)  HR: 105 (08 Aug 2022 13:34) (98 - 105)  BP: 120/57 (08 Aug 2022 13:34) (118/60 - 128/59)  BP(mean): --  RR: 18 (08 Aug 2022 13:34) (18 - 18)  SpO2: --    LABS:                        8.4    58.76 )-----------( 310      ( 07 Aug 2022 08:59 )             25.2     08-07    137  |  100  |  23<H>  ----------------------------<  65<L>  3.5   |  18  |  1.0    Ca    7.6<L>      07 Aug 2022 08:59  Phos  2.3     08-07                    RADIOLOGY:  RU Q US 8/1/22: Cholelithiasis without ductal dilation   US KB 8/1/22: Normal renal ultrasound, pelvic mass w/ compression on urinary bladder  Venous LE duplex 8/1: No evidence of deep venous thrombosis in either lower extremity. Bilateral peroneal veins are not visualized    CTAP w/ IV con 8/6/22 1. Limited exam demonstrates multiple bibasilar pulmonary nodules which have overall increased in size and number, suspicious for pulmonary metastases.  2. Increased conglomerate retroperitoneal adenopathy.  3. Evaluation of the pelvic organs is markedly limited by artifact   related to patient length contacting the CT gantry; confluent hypodensity again noted within the pelvis, poorly evaluated.    CT head 8/15: No acute intracranial pathology. No evidence of midline shift, mass effect or intracranial hemorrhage.  Please note evaluation for intracranial metastatic disease is limited without intravenous contrast. A postcontrast brain MRI is recommended for further evaluation if not clinically contraindicated.      PHYSICAL EXAM:  CONSTITUTIONAL: No acute distress, well-developed, well-groomed, cooperative, AAOx3  PULMONARY: Clear to auscultation bilaterally; no wheezes, rales, or rhonchi  CARDIOVASCULAR: Regular rate and rhythm; no murmurs, rubs, or gallops  GASTROINTESTINAL: Soft, rounded, non-tender, non-distended; bowel sounds present  MUSCULOSKELETAL: pedal and tibial pulses unable to palpate; no clubbing, no cyanosis, 5/5 strength in all extremities  SKIN: Edematous grossly in LE b/l, erythematous rash present in abdominal and pelvic skin folds, skin is clean, dry. Right arm midline clean and intact.

## 2022-08-08 NOTE — PROGRESS NOTE ADULT - ASSESSMENT
58 y/o F with PMHx of lupus, hypothyroidism, antiphospholipid syndrome, HTN, and anemia who presented to the ER complaining of diffuse pelvic pain and back pain x 3 weeks associated with decreased appetite and vomiting over the last 8 days. evaluated for pelvic mass with multiple lung nodules. GI consulted to evaluate for possible GI tract primary.     # AdenoCarcinoma with unclear primary. R/O CRC, or Stomach cancer   # Chronic anemia, Microcytic with Iron deficiency component  # Diarrhea, cancer related?     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 The possible primary site include ovary, uterus or the pritoneum.  - CT A/P: bilateral adnexal hypodensities para-aortic lymphadenopathy as well as bilateral pulm nodules.  - Never had colonoscopy or colonoscopy.   - FH of colon cancer at age 45 (uncle)  - CEA 5.5  - reviewed labs and imaging   - Iron profile reviewed   - the incidence of isolated lung metastasis without liver lesions is low in CRC. but remains possible.   - GI PCR negative   - C. Diff PCR negative   - GI/GYN tumors can cause diarrhea   - started  imodium by the team, improving.  - started on chemo per medical oncology.   - Case Discussed with Hem/onc. Overall it is less likely that the primary cancer is originated from the GI tract. Given WAYNE, FH of CRC, Pt never had screening colonoscopy, diarrhea and lack of evidence of the source of this Adenocarcinoma, will plan for EGD/colon to rule in/out the possibility of GI malignancy.     Plan:   - Will plan for EGD/colonoscopy on Thursday 8/11.  - Clear liquids on Wednesday, NPO after midnight. Golytely at 2:00 PM, Dulcolax 20 mg at bedtime.   - Monitor electrolytes and correct as needed.   - Please repeat COVID PCR        58 y/o F with PMHx of lupus, hypothyroidism, antiphospholipid syndrome, HTN, and anemia who presented to the ER complaining of diffuse pelvic pain and back pain x 3 weeks associated with decreased appetite and vomiting over the last 8 days. evaluated for pelvic mass with multiple lung nodules. GI consulted to evaluate for possible GI tract primary.     # AdenoCarcinoma with unclear primary. R/O CRC, or Stomach cancer   # Chronic anemia, Microcytic with Iron deficiency component  # Diarrhea, cancer related?     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 The possible primary site include ovary, uterus or the pritoneum.  - CT A/P: bilateral adnexal hypodensities para-aortic lymphadenopathy as well as bilateral pulm nodules.  - Never had EGD or colonoscopy.   - FH of colon cancer at age 45 (uncle)  - CEA 5.5  - reviewed labs and imaging   - Iron profile reviewed   - the incidence of isolated lung metastasis without liver lesions is low in CRC. but remains possible.   - GI PCR negative   - C. Diff PCR negative   - GI/GYN tumors can cause diarrhea   - started  imodium by the team, improving.  - started on chemo per medical oncology.   - Case Discussed with Hem/onc. Overall it is less likely that the primary cancer is originated from the GI tract. Given WAYNE, FH of CRC, Pt never had screening colonoscopy, diarrhea and lack of evidence of the source of this Adenocarcinoma, will plan for EGD/colon to rule in/out the possibility of GI malignancy.     Plan:   - Will plan for EGD/colonoscopy on Thursday 8/11.  - Clear liquids on Wednesday, NPO after midnight. Golytely at 2:00 PM, Dulcolax 20 mg at bedtime.   - Monitor electrolytes and correct as needed.   - Please repeat COVID PCR        58 y/o F with PMHx of lupus, hypothyroidism, antiphospholipid syndrome, HTN, and anemia who presented to the ER complaining of diffuse pelvic pain and back pain x 3 weeks associated with decreased appetite and vomiting over the last 8 days. evaluated for pelvic mass with multiple lung nodules. GI consulted to evaluate for possible GI tract primary.     # AdenoCarcinoma with unclear primary. R/O CRC, or Stomach cancer   # Chronic anemia, Microcytic with Iron deficiency component  # Diarrhea, cancer related?     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 The possible primary site include ovary, uterus or the pritoneum.  - CT A/P: bilateral adnexal hypodensities para-aortic lymphadenopathy as well as bilateral pulm nodules.  - Never had EGD or colonoscopy.   - FH of colon cancer at age 45 (uncle)  - CEA 5.5  - reviewed labs and imaging   - Iron profile reviewed   - the incidence of isolated lung metastasis without liver lesions is low in CRC. but remains possible.   - GI PCR negative   - C. Diff PCR negative   - GI/GYN tumors can cause diarrhea   - started  imodium by the team, improving.  - started on chemo per medical oncology.   - Case Discussed with Hem/onc. Overall it is less likely that the primary cancer is originated from the GI tract. Given WAYNE, FH of CRC, Pt never had screening colonoscopy, diarrhea and lack of evidence of the source of this Adenocarcinoma, will plan for EGD/colon to rule in/out the possibility of GI malignancy.   -isolated elevation of alk phos could be from bone involvement in the setting of metastatic tumor    Plan:   - Will plan for EGD/colonoscopy on Thursday 8/11.  - Clear liquids on Wednesday, NPO after midnight. Golytely at 2:00 PM, Dulcolax 20 mg at bedtime.   - Monitor electrolytes and correct as needed.   - check alk phos isoenzymes and GGT  - Please repeat COVID PCR        56 y/o F with PMHx of lupus, hypothyroidism, antiphospholipid syndrome, HTN, and anemia who presented to the ER complaining of diffuse pelvic pain and back pain x 3 weeks associated with decreased appetite and vomiting over the last 8 days. evaluated for pelvic mass with multiple lung nodules. GI consulted to evaluate for possible GI tract primary.     # AdenoCarcinoma with unclear primary. R/O CRC, or Stomach cancer   # Chronic anemia, Microcytic with Iron deficiency component  # Diarrhea, cancer related?     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 The possible primary site include ovary, uterus or the pritoneum.  - CT A/P: bilateral adnexal hypodensities para-aortic lymphadenopathy as well as bilateral pulm nodules.  - Never had EGD or colonoscopy.   - FH of colon cancer at age 45 (uncle)  - CEA 5.5  - reviewed labs and imaging   - Iron profile reviewed   - the incidence of isolated lung metastasis without liver lesions is low in CRC. but remains possible.   - GI PCR negative   - C. Diff PCR negative   - started  imodium by the team, improving.  - started on chemo per medical oncology.   - Case Discussed with Hem/onc. Overall it is less likely that the primary cancer is originated from the GI tract. Given WAYNE, FH of CRC, Pt never had screening colonoscopy, diarrhea and lack of evidence of the source of this Adenocarcinoma, will plan for EGD/colon to rule in/out the possibility of GI malignancy.   -isolated elevation of alk phos could be from bone involvement in the setting of metastatic tumor    Plan:   - Will plan for EGD/colonoscopy on Thursday 8/11 - however pretest probability of finding a luminal GI malignancy is low as mentioned above  - Clear liquids on Wednesday, NPO after midnight. Golytely at 2:00 PM, Dulcolax 20 mg at bedtime.   - Monitor electrolytes and correct as needed.   - check alk phos isoenzymes and GGT  - Please repeat COVID PCR

## 2022-08-08 NOTE — PROGRESS NOTE ADULT - ASSESSMENT
Pt is a 58 y/o F with PMHx of lupus, hypothyroidism, antiphospholipid syndrome, HTN, and anemia who presented to the ER complaining of diffuse pelvic pain and back pain x 3 weeks associated with decreased appetite and vomiting over the last 8 days.    # COVID + on last admit  was exposed 7/16 to another pt in hosp w/ COVID;  also had COVID  1st COVID + 7/21 -> 21 days of isolation -> to 8/11 (Thurs)  pt s/p Paxlovid as outpt recently  seems asymp, except altered taste and smell  NOT on O2   this is NOT why WBC high    # diarrhea - not sure why - poss irritation from tumor vs other; metab acidosis  C Diff neg; GI pcr neg  imodium 2mg po q4 for now (not prn) - helping  d/c neutraphos pkts - pt says this is contributing to diarrhea  incr NaHCO3 650mg po q8  d/c NS (can cause acidosis)  GI eval  f/u BMP    # leg edema; HTN  lisinopril off (had GOYO)  d/c Nifed - BP getting on low side  lasix 20mg po q24    # hypokalemia  f/u BMP    # hypophosphatemia  prob effect of PTHRP and malnutrition w/ refeeding syndrome  pt refused neutraphos pkt 2/2 diarrhea  give dairy (milk) or meat to incr dietary phos    # hypercalcemia - much better now  iPTH < 20 -> hyperpara RULED OUT  likely malign in origin - PTHrP 11 (nl <2) and + adenoCa on bx (PTHrP is secreted by many tumors - ie, not specific)  s/p Pamidronate 90mg iv x1 last admit  25 OH Vit D 74 (upper limit of nl)    # confirmed malignancy (adenoCa of Mullerian Origin (endometroid vs ovarian)?): b/l adnexal lesions (primary vs metastatic ([place])); paraaortic LN; 15+ pulm nodules;   GI eval: might do upper/lower endo this week  CT C: 15+ pulm nod up to 1.7cm - IR s/p bx of lung nod 7/21  path: + adenoCa, Mullerian Origin (?)  TVUS: Rt 6.6 cm mixed solid-cystic structure; Lt 5.6 cm heterog, solid structure w/ doppler flow   CT A/P: bilateral adnexal hypodensities, para-aortic lymphadenopathy as well as bilateral pulm nodules  rpt CT A/P (ordered w/ IV cont, but rads did w/out IV Cont): show incr pulm mets and incr size of pelvic pathology  CT H: ordered w/ IV cont, but rads did w/out IV cont): no mets  Pt is too large to fit into MRI gantry: so MRI B and MRI pelvis cannot be done  Breast U/S ordered: rads refuses to do as inpt, so this test will have to be outpt  will hold off on mammogram (logistically too hard to obtain in obese pt who cannot stand and machine in another building)  H/O: will start carb/taxol   Gyn/Onc noted  : 33 (nl); CA 19-9: 19 (nl); CEA 5.5; ; HCG < 0.6; S preg neg; ESR 80;  uric acid 6.7 -> 11.5 -> 4.5 (prob cell turnover) - decr allopurinol 300mg po q24 (f/u h/o)   Inhibin A: neg; B: slightly elevated   f/u CA 15-3 and CA 27-29    # neopl-related pain  oxy ir 5mg po q4 prn mod pain  oxy ir 7.5mg po q4 prn sev pain  d/c percocet    # marked leukocytosis and tachycardia = SIRS 2/2 malign  prob reactive   bld cx all neg   no abx for now  BCR/ABL neg; JAK2 neg; flow cyto neg; CALR neg; MPL mut analysis neg   cbc NOT needed daily    # microcytic anemia  prob of malign  iron sat 10%, TIBC 314, Ferr 85 = could be iron def  no IV iron for now given SIRS  FeSO4 325mg po q24  B12, Fol nl  hapto 262   (upper nl)  retic 2.7%; t red 0.3   - this is NOT hemolysis, even intramedullary; I suspect this is a marker for malign or cell turnover  GI eval    # minor GOYO - prob dehydration;  Renal U/S: no hydro  Cr much better    # abnl LFTs mild (incr AP and slight incr AST)  prob related to underlying malign  RUQ U/S: GB stones w/out cholecystitis  no further w/u for now  no need to follow LFTs    # lupus - does not seem active currently  c/w HCQ 200mg po q12  c/w cymbalta    # antiphospholipid syndrome  prob just element of lupus hx  no clots; only 1 miscarriage  cont asa   no a/c    # Hypothyroidism  c/w levothyroxine 150mg oral daily  TSH 8.61 - would NOT raise dose given tachycardia  free T4 nl and free T3 low (prob sick euthyroid)    # no hx of DM  d/c all FS - they are always nl    # DVT ppx: LMWH (d/c hep sc); Cr better    # GI ppx: none    # Activity: used to walk and cannot now; s/p fall at home; PT eval    # Code: full    Dispo: f/u GI eval; f/u H/O re chemo; f/u breast tumor markers; tx pain; tx diarrhea;   eventually, pt will need STR, either 4A or SNF vs home - should be ready to d/c in 24-48 hrs if chip chemo OK    Prog is very guarded.

## 2022-08-08 NOTE — PROGRESS NOTE ADULT - ASSESSMENT
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       #confirmed stage 4 adenocarcinoma of mullerian origin w/ b/l adnexal lesions, paraaortic LAD, >15 pulmonary nodule  #leukocytosis with neutrophilic predominance  - CA 19-9 normal,  normal, CEA 5.5   - pending CA 15.3 and CA 27.29  - ; HCG < 0.6; serum pregnancy neg; ESR 80; uric acid 6.7 Inhibin A: neg; B: slightly elevated   - s/p IR bx of lung nod 7/21. Pathology: Adenocarcinoma of mullerian origin adenocarcinoma, morphologically consistent with endometrioid adenocarcinoma. The possible primary sites include ovary, uterus or the peritoneum  - PDL1 and NGS pending   - CT head no cont: no evidence of metastasis, study limited   - CT Chest w/ IV cont: 15+ pulm nod up to 1.7cm   - CT A/P w/ IV cont: bilateral adnexal hypodensities, para-aortic lymphadenopathy, hypodensity w/in the pelvis repeat study limited due to artifact  - MRI unable to obtain because of patient size   - TVUS: Rt 6.6cm mixed solid-cystic structure; Lt 5.6 cm heterogeneous solid structure may represent ovarian mass or degenerating fibroid  - OBGYN following, no surgical intervention, repeat CTAP study is limited  - GI consulted for elevated CEA, will f/u recs for possible colonoscopy  - US breast b/l r/o mass, cancelled by radiology stating they can not do diagnositc mammogram inpatient, will f/u   - pain management with oxycodone 5mg q4hr PRN and 7.5mg q4hr PRN for moderate and severe pain respectively       #Generalized weakness  #Fall  #covid+ prior admission  - general weakness from prior covid infections  - 1st covid +ve 7/21 needs 21 days of isolation to 8/11  - s/p paxlovid outpatient  - bed bound, mobility improving with exercises in bed, PT assessment   - c/w PT possible d/c to STR    #Hypercalcemia  #Hypophosphatemia, resolved   #GOYO/HAGMA, resolved  #Hypokalemia, resolved  - K 3.5, phos 2.3  - suspected PTHrP and mulnutrition w/ refeeding syndrome  - iPTH <20, PTHrP 11  - s/p pamidronate 90mg 1x previous admission  - zofran 4mg PO q6hr PRN  - Uric acid 4.5 >11, adjusted allopurinol 300mg po 24   - possibly 2/2 tumor burden (WBC 50k w/ NT predominance)  - BMP q48hr w/ phos  - neutraphos d/c - pt stating it was causing diarrhea     #leukocytosis and tachycardia - SIRS positive  - suspected in setting of malignancy  - Bcx negative  - monitor off Abx  - BCR/ABL, JAK2, flow cytometry, CALR, MPL mutation all negative  - possible BM Bx OP    #Diarrhea, improving  - gi pcr and c. diff negative  - c/w on loperamide and simethicone     #Alk phos elevation   - RUQ US - cholelithiasis with ductal dilation, GB stones w/o cholecystitis    # microcytic hypochromic anemia  - Last admission:   iron sat 10%, TIBC 314, Ferr 85 = could be iron def  - FeSO4 325mg po q24  - B12, Fol nl  - hapto 262  - MMA level normal 337  - retic 2.7%; t red 0.3  -  - this is NOT hemolysis, even intramedullary; possible marker for lymphoma or tumor burden    #abnormal LFTs  - RUQ US - GB stones w/out cholecystitis  - no current workup     # antiphospholipid syndrome  - no hx of clots; only 1 miscarriage  - on aspirin  - no a/c    # lupus - not in active flare  - c/w HCQ 200mg po q12   - c/w cymbalta    # HTN  #leg edema  #GOYO  - held home lisinopril 40mg oral daily in setting of GOYO - Cr. 1.5 w/ baseline ~0.8  - nifedipine adjusted to 30mg POD  - lasix 20mg q24hr    # Hypothyroidism  - c/w levothyroxine 150mg oral daily  - TSH 8.61 - f/u Free T4 nl and low T3 - suspected euthyroid sick syndrome    #Misc  - DVT Prophylaxis: hep SQ TID   - Diet: DASH/TLC  - GI Prophylaxis: none  - Activity: bed bound  - IV Fluids: off  - Code Status: FULL    Dispo: [facility]     # 58 y/o F with PMHx of lupus, hypothyroidism, antiphospholipid syndrome, HTN, and anemia complaining of pelvic and back pain for 3 weeks with decreased appetite and vomiting for 8 days admitted for progressive generalized weakness and fall 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       #confirmed stage 4 adenocarcinoma of mullerian origin w/ b/l adnexal lesions, paraaortic LAD, >15 pulmonary nodule  #leukocytosis with neutrophilic predominance  - CA 19-9 normal,  normal, CEA 5.5   - pending CA 15.3 and CA 27.29  - ; HCG < 0.6; serum pregnancy neg; ESR 80; uric acid 6.7 Inhibin A: neg; B: slightly elevated   - s/p IR bx of lung nod 7/21. Pathology: Adenocarcinoma of mullerian origin adenocarcinoma, morphologically consistent with endometrioid adenocarcinoma. The possible primary sites include ovary, uterus or the peritoneum  - PDL1 and NGS pending   - CT head no cont: no evidence of metastasis, study limited   - CT Chest w/ IV cont: 15+ pulm nod up to 1.7cm   - CT A/P w/ IV cont: bilateral adnexal hypodensities, para-aortic lymphadenopathy, hypodensity w/in the pelvis repeat study limited due to artifact  - MRI unable to obtain because of patient size   - TVUS: Rt 6.6cm mixed solid-cystic structure; Lt 5.6 cm heterogeneous solid structure may represent ovarian mass or degenerating fibroid  - OBGYN following, no surgical intervention, repeat CTAP study is limited  - GI consulted for elevated CEA, will f/u recs for possible EGD/colonoscopy  - US breast b/l r/o mass, cancelled by radiology stating they can not do diagnositc mammogram inpatient, will f/u   - pain management with oxycodone 5mg q4hr PRN and 7.5mg q4hr PRN for moderate and severe pain respectively   - Heme/Onc starting carb/taxol 8/8/22  - will need CBC and CMP tomorrow      #Generalized weakness  #Fall  #covid+ prior admission  - general weakness from prior covid infections  - 1st covid +ve 7/21 needs 21 days of isolation to 8/11  - s/p paxlovid outpatient  - bed bound, mobility improving with exercises in bed, PT assessment   - c/w PT possible d/c to STR    #Hypercalcemia  #Hypophosphatemia, resolved   #GOYO/HAGMA, resolved  #Hypokalemia, resolved  - K 3.5, phos 2.3  - Ca 7.6 in setting of low albumin (previously 11)  - suspected PTHrP and mulnutrition w/ refeeding syndrome  - iPTH <20, PTHrP 11  - can replete phosphate with dairy/meat  - s/p pamidronate 90mg 1x previous admission  - zofran 4mg PO q6hr PRN  - Uric acid 4.5 >11, adjusted allopurinol 300mg po 24   - possibly 2/2 tumor burden (WBC 50k w/ NT predominance)  - NaHCO3 650mg po Q8hr   - BMP q48hr w/ phos  - neutraphos d/c - pt stating it was causing diarrhea     #Diarrhea, improving  - gi pcr and c. diff negative  - c/w on loperamide 2mg po q4hr     #leukocytosis and tachycardia - SIRS positive  - suspected in setting of malignancy  - Bcx negative  - monitor off Abx  - BCR/ABL, JAK2, flow cytometry, CALR, MPL mutation all negative  - possible BM Bx OP    # microcytic hypochromic anemia  - Last admission:   iron sat 10%, TIBC 314, Ferr 85 = could be iron def  - FeSO4 325mg po q24  - B12, Fol nl  - hapto 262  - MMA level normal 337  - retic 2.7%; t red 0.3  -  - this is NOT hemolysis, even intramedullary; possible marker for lymphoma or tumor burden    # lupus - not in active flare  #antiphospholipid syndrome  - c/w HCQ 200mg po q12   - c/w cymbalta  - c/w asa   - no hx of clots; only 1 miscarriage  - no anticoagulation     # HTN  #leg edema  #GOYO  - Cr improved 1.0 > 1.5 baseline ~0.8  - Renal US no hydronephrosis  - nifedipine d/c  - lasix 20mg pod    # Hypothyroidism  - c/w levothyroxine 150mg oral daily  - TSH 8.61 - f/u Free T4 nl and low T3 - suspected euthyroid sick syndrome    #abnormal LFTs  #Alk phos elevation   - RUQ US - GB stones w/out cholecystitis  - no current workup     #Misc  - DVT Prophylaxis: hep SQ TID   - Diet: DASH/TLC  - GI Prophylaxis: none  - Activity: bed bound  - IV Fluids: off  - Code Status: FULL    Dispo: SNF vs home pending first cycle of chemo

## 2022-08-09 NOTE — PROGRESS NOTE ADULT - SUBJECTIVE AND OBJECTIVE BOX
KELSIE JUAREZ  57y  Female    INTERVAL EVENTS: Here for f/u of cancer. no acute issues overnight. s/p 1st dose of chemotherapy. on room air. afebrile     T(F): 96.5 (08-08-22 @ 06:25), Max: 97.1 (08-07-22 @ 14:50)  HR: 102 (08-08-22 @ 06:25) (98 - 104)  BP: 127/58 (08-08-22 @ 06:25) (118/60 - 128/59)  RR: 18 (08-08-22 @ 06:25) (18 - 18)  SpO2: --    Gen: NAD  HEENT: PERRL, EOMI, mouth clr, nose clr  Neck: no nodes, no JVD, thyroid nl  lungs: clr  hrt: s1 s2 reg, tachy (HR 90 on my exam), no murmur  abd: soft, obese, ND, mildly tender lower abd; no HS megaly; hard to eval for mass given body habitus  ext: 1+ edema, no c/c  neuro: aa ox3, cn intact, can move all 4 ext, but legs very weak    LABS:                      8.4     (    76.4   58.76 )-----------( ---------      310      ( 07 Aug 2022 08:59 )             25.2    (    22.4     CAPILLARY BLOOD GLUCOSE  POCT Blood Glucose.: 74 (08-06-22 @ 19:27)    Culture - Blood (collected 08-02-22 @ 12:13)  Source: .Blood None  Final Report (08-07-22 @ 23:00):    No Growth Final    RADIOLOGY & ADDITIONAL TESTS:  < from: CT Abdomen and Pelvis w/ Oral Cont (08.06.22 @ 16:58) >  IMPRESSION:    1. Limited exam as described above demonstrates multiple bibasilar   pulmonary nodules which have overall increased in size and number,   suspicious for pulmonary metastases.    2. Increased conglomerate retroperitoneal adenopathy.    3. Evaluation of the pelvic organs is markedly limited by artifact   related to patient length contacting the CT gantry; confluent hypodensity   again noted within the pelvis, poorly evaluated.    < end of copied text >    < from: CT Head No Cont (08.06.22 @ 16:57) >  IMPRESSION:  No acute intracranial pathology. No evidence of midline shift, mass   effect or intracranial hemorrhage.    Please note evaluation for intracranial metastatic disease is limited   without intravenous contrast. A postcontrast brain MRI is recommended for   further evaluation if not clinically contraindicated.    < end of copied text >    MEDICATIONS:  hydroxychloroquine 200 milliGRAM(s) Oral two times a day    allopurinol 300 milliGRAM(s) Oral daily  aspirin  chewable 81 milliGRAM(s) Oral daily  CARBOplatin IVPB (eMAR) 750 milliGRAM(s) IV Intermittent once  dexAMETHasone  IVPB 12 milliGRAM(s) IV Intermittent once  diphenhydrAMINE IVPB 25 milliGRAM(s) IV Intermittent once  DULoxetine 60 milliGRAM(s) Oral daily  enoxaparin Injectable 40 milliGRAM(s) SubCutaneous every 24 hours  famotidine  IVPB 20 milliGRAM(s) IV Intermittent once  ferrous    sulfate 325 milliGRAM(s) Oral daily  fosaprepitant IVPB 150 milliGRAM(s) IV Intermittent once  furosemide    Tablet 20 milliGRAM(s) Oral daily  levothyroxine 150 MICROGram(s) Oral daily  loperamide 2 milliGRAM(s) Oral every 4 hours  nystatin Powder 1 Application(s) Topical two times a day  oxycodone    5 mG/acetaminophen 325 mG 2 Tablet(s) Oral every 4 hours PRN  PACLitaxel IVPB (eMAR) 458 milliGRAM(s) IV Intermittent once  simethicone 80 milliGRAM(s) Chew four times a day PRN  sodium bicarbonate 650 milliGRAM(s) Oral every 8 hours

## 2022-08-09 NOTE — PROGRESS NOTE ADULT - ASSESSMENT
Patient is a 58 y/o F with a pmhx of lupus, hypothyroidism, antiphospholipid syndrome, HTN, anemia,  recently diagnosed  with  b/l adnexal masses and lung nodules, unknown primary disease (7/2022), presented with weakness and falls and admitted.      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  #Bilateral adnexal masses with para-aortic lymphadenopathy and pulmonary nodules suggestive of metastatic disease.  #Biopsy of Left upper lobe Lung nodule- Adenocarcinoma of mullerian origin.  PDL-1 and NGS pending.    -Ca 19-9, Ca125 normal  -CEA 5.5  -CA 15.3 and CA 27.29 pending.  -US Transvaginal : right 6.6 cm heterogeneous mixed solid-cystic mass and lower pelvic 5.6 cm heterogenous solid appearing may represent ovarian mass or degenerating fibroid.  -CT A/P W contrast 7/2022: B/l confluent hypodensities in b/l adnexae measuring 6.8 cm x 4.5 cm, paraaortic lymphadenopathy and b/l solid pulmonary nodules largest measuring 8mm.  -Pathology of Left upper lobe lung nodule :  Adenocarcinoma of mullerian origin adenocarcinoma, morphologically consistent with endometrioid adenocarcinoma. The possible primary sites include ovary, uterus or the peritoneum. While GATA3 staining can be seen in small percentage of endometrioid carcinoma, clinical correlation to rule out the possibility of breast carcinoma is recommended.    -PDL1 and NGS pending.  -Although the primary may be ovarian, however breast or other etiologies should be considered given the normal Ca 125. Apparently, the breast examination has been negative.  -MRI of head w contrast : unable to be obtained as pt is too big.  -CT head without contrast : No metastasis noted. However done without contrast.  -OBGYN recs repeat imaging of CT A/P with contrast and pathology for second opinion of pathology specimen.  -OBGYN recs no surgical intervention for now.  -GI was consulted due to elevated CEA.   --Unable to obtain of B/L breasts diagnostic mammogram as pt is unable to stand.     # Leukocytosis and thrombocytosis likely reactive secondary to underlying malignancy  - Peripheral flow Normal.  - BCR-ABL, Jak2, CALR, MPL Negative for any mutations.    #Microcytic anemia:  -Received 5 doses of Venofer for Iron deficiency anemia in 7/2022 during prior admission.    #Covid Pneumonia   h/o general weakness from previous covid infections, reports sxs improve about 1-2weeks after recovery  - 1st covid +ve 7/21 needs 21 days of isolation to 8/11  - s/p paxlovid as outpatient.    Recommendations.    -Completed cycle 1 of  chemotherapy with carboplatin and taxol  8/8/2022. Tolerated chemotherapy well.  -Pt was clearly explained the benefits and risks of chemotherapy.  -Pt next cycle of chemotherapy will be on 8/29/22. If pt is still in the hospital will give chemotherpy here. If pt is discharged will schedule follow up appointment.  -monitor CBC and cmp tomorrow.    -The side effects from chemotherapy have been discussed including but not limited to: Pain, hyponatremia, hypomagnesemia, hypocalcemia, hypokalemia, vomiting, abdominal pain, nausea and vomiting, bone marrow depression, anemia, leukopenia, neutropenia, thrombocytopenia, increased serum alkaline phosphatase, increased serum AST, hypersensitivity, weakness, decreased creatinine clearance, increased blood urea nitrogen; flushing, ECG abnormality, edema, hypotension, alopecia, skin rash, diarrhea, stomatitis, hemorrhage, hypersensitivity reaction, infection, injection site reaction, peripheral neuropathy, arthralgia, myalgia, asthenia, fever.    The chemotherapy dose was adjusted according to pt's height, weight, labs and anticipated tolerance.  The high risk of complications and complexity associated with antineoplastic therapy administration has been explained to the pt.    -Further recommendations for treatment after the above issues clarified. Patient is a 58 y/o F with a pmhx of lupus, hypothyroidism, antiphospholipid syndrome, HTN, anemia,  recently diagnosed  with  b/l adnexal masses and lung nodules, unknown primary disease (7/2022), presented with weakness and falls and admitted.      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  #Bilateral adnexal masses with para-aortic lymphadenopathy and pulmonary nodules suggestive of metastatic disease.  #Biopsy of Left upper lobe Lung nodule- Adenocarcinoma of mullerian origin.  PDL-1 and NGS pending.    -Ca 19-9, Ca125 normal  -CEA 5.5  -CA 15.3 and CA 27.29 pending.  -US Transvaginal : right 6.6 cm heterogeneous mixed solid-cystic mass and lower pelvic 5.6 cm heterogenous solid appearing may represent ovarian mass or degenerating fibroid.  -CT A/P W contrast 7/2022: B/l confluent hypodensities in b/l adnexae measuring 6.8 cm x 4.5 cm, paraaortic lymphadenopathy and b/l solid pulmonary nodules largest measuring 8mm.  -Pathology of Left upper lobe lung nodule :  Adenocarcinoma of mullerian origin adenocarcinoma, morphologically consistent with endometrioid adenocarcinoma. The possible primary sites include ovary, uterus or the peritoneum. While GATA3 staining can be seen in small percentage of endometrioid carcinoma, clinical correlation to rule out the possibility of breast carcinoma is recommended.    -PDL1 and NGS pending.  -Although the primary may be ovarian, however breast or other etiologies should be considered given the normal Ca 125. Apparently, the breast examination has been negative.  -MRI of head w contrast : unable to be obtained as pt is too big.  -CT head without contrast : No metastasis noted. However done without contrast.  -OBGYN recs repeat imaging of CT A/P with contrast and pathology for second opinion of pathology specimen.  -OBGYN recs no surgical intervention for now.  -GI was consulted due to elevated CEA.   --Unable to obtain of B/L breasts diagnostic mammogram as pt is unable to stand.     # Leukocytosis and thrombocytosis likely reactive secondary to underlying malignancy  - Peripheral flow Normal.  - BCR-ABL, Jak2, CALR, MPL Negative for any mutations.    #Microcytic anemia:  -Received 5 doses of Venofer for Iron deficiency anemia in 7/2022 during prior admission.    #Covid Pneumonia   h/o general weakness from previous covid infections, reports sxs improve about 1-2weeks after recovery  - 1st covid +ve 7/21 needs 21 days of isolation to 8/11  - s/p paxlovid as outpatient.    Recommendations.    -Completed cycle 1 of  chemotherapy with carboplatin and taxol  8/8/2022. Tolerated chemotherapy well.  -Pt was clearly explained the benefits and risks of chemotherapy.  -Pt next cycle of chemotherapy will be on 8/29/22. If pt is still in the hospital will give chemotherpy here. If pt is discharged will schedule follow up appointment.  -monitor CBC and cmp tomorrow.  -please consult Nephrology for GOYO. Unlikely caused due to chemotherapy.    -The side effects from chemotherapy have been discussed including but not limited to: Pain, hyponatremia, hypomagnesemia, hypocalcemia, hypokalemia, vomiting, abdominal pain, nausea and vomiting, bone marrow depression, anemia, leukopenia, neutropenia, thrombocytopenia, increased serum alkaline phosphatase, increased serum AST, hypersensitivity, weakness, decreased creatinine clearance, increased blood urea nitrogen; flushing, ECG abnormality, edema, hypotension, alopecia, skin rash, diarrhea, stomatitis, hemorrhage, hypersensitivity reaction, infection, injection site reaction, peripheral neuropathy, arthralgia, myalgia, asthenia, fever.    The chemotherapy dose was adjusted according to pt's height, weight, labs and anticipated tolerance.  The high risk of complications and complexity associated with antineoplastic therapy administration has been explained to the pt.    -Further recommendations for treatment after the above issues clarified.

## 2022-08-09 NOTE — PROGRESS NOTE ADULT - REASON FOR ADMISSION
weakness and falling
adenocarcinoma of mullerian origin
Weakness, diarrhea, mullerian cancer with metastastis
malignancy
metastatic adenocarcinoma
weakness, diarrhea, falls
poor ambulation

## 2022-08-09 NOTE — PROGRESS NOTE ADULT - SUBJECTIVE AND OBJECTIVE BOX
KELSIE JUAREZ 57y Female  MRN#: [id number]   Hospital Day: 10d    SUBJECTIVE  Patient is a 57y old Female who presents with a chief complaint of malignancy (09 Aug 2022 16:40)  Currently admitted to medicine with the primary diagnosis of GOYO (acute kidney injury)      INTERVAL HPI AND OVERNIGHT EVENTS:  Patient was examined and seen at bedside. This morning she is resting bed, looking more fatigued than before. Patient just underwent first round of chemotherapy, stated it went well. Reports improvement of her BMs only having one episode of diarrhea in the AM, urinating without issues, pain well tolerated. Pt awaiting colonoscopy thursday. Vitals stable no events overnight.     OBJECTIVE  PAST MEDICAL & SURGICAL HISTORY  Lupus    Hypothyroidism    Iron deficiency anemia    No significant past surgical history      ALLERGIES:  NSAIDs (Stomach Upset)    MEDICATIONS:  STANDING MEDICATIONS  allopurinol 300 milliGRAM(s) Oral daily  aspirin  chewable 81 milliGRAM(s) Oral daily  DULoxetine 60 milliGRAM(s) Oral daily  enoxaparin Injectable 40 milliGRAM(s) SubCutaneous every 24 hours  ferrous    sulfate 325 milliGRAM(s) Oral daily  hydroxychloroquine 200 milliGRAM(s) Oral two times a day  levothyroxine 150 MICROGram(s) Oral daily  loperamide 2 milliGRAM(s) Oral every 4 hours  nystatin Powder 1 Application(s) Topical two times a day  sodium bicarbonate 650 milliGRAM(s) Oral every 8 hours  sodium chloride 0.9%. 1000 milliLiter(s) IV Continuous <Continuous>    PRN MEDICATIONS  oxyCODONE    IR 5 milliGRAM(s) Oral every 4 hours PRN  oxyCODONE    IR 7.5 milliGRAM(s) Oral every 4 hours PRN  simethicone 80 milliGRAM(s) Chew four times a day PRN      VITAL SIGNS: Last 24 Hours  T(C): 35.9 (09 Aug 2022 13:15), Max: 37.1 (08 Aug 2022 19:58)  T(F): 96.7 (09 Aug 2022 13:15), Max: 98.8 (08 Aug 2022 19:58)  HR: 108 (09 Aug 2022 13:15) (104 - 109)  BP: 123/57 (09 Aug 2022 13:15) (104/55 - 131/60)  BP(mean): --  RR: 18 (09 Aug 2022 13:15) (18 - 18)  SpO2: 94% (09 Aug 2022 08:05) (94% - 98%)    LABS:                        9.0    30.93 )-----------( 247      ( 09 Aug 2022 08:19 )             27.7     08-09    137  |  99  |  36<H>  ----------------------------<  102<H>  4.1   |  14<L>  |  2.2<H>    Ca    8.4<L>      09 Aug 2022 08:19  Phos  3.6     08-09  Mg     2.1     08-09    TPro  5.5<L>  /  Alb  2.6<L>  /  TBili  1.3<H>  /  DBili  x   /  AST  101<H>  /  ALT  22  /  AlkPhos  1588<H>  08-09                  RADIOLOGY:  RU Q US 8/1/22: Cholelithiasis without ductal dilation   US KB 8/1/22: Normal renal ultrasound, pelvic mass w/ compression on urinary bladder  Venous LE duplex 8/1: No evidence of deep venous thrombosis in either lower extremity. Bilateral peroneal veins are not visualized    CTAP w/ IV con 8/6/22 1. Limited exam demonstrates multiple bibasilar pulmonary nodules which have overall increased in size and number, suspicious for pulmonary metastases.  2. Increased conglomerate retroperitoneal adenopathy.  3. Evaluation of the pelvic organs is markedly limited by artifact   related to patient length contacting the CT gantry; confluent hypodensity again noted within the pelvis, poorly evaluated.    CT head 8/15: No acute intracranial pathology. No evidence of midline shift, mass effect or intracranial hemorrhage.  Please note evaluation for intracranial metastatic disease is limited without intravenous contrast. A postcontrast brain MRI is recommended for further evaluation if not clinically contraindicated.      PHYSICAL EXAM:  CONSTITUTIONAL: No acute distress, well-developed, obese,  well-groomed, cooperative, AAOx3  PULMONARY: Clear to auscultation bilaterally; no wheezes, rales, or rhonchi  CARDIOVASCULAR: Regular rate and rhythm; no murmurs, rubs, or gallops  GASTROINTESTINAL: Soft, rounded, non-tender, non-distended; bowel sounds present  MUSCULOSKELETAL: pedal and tibial pulses unable to palpate; no clubbing, no cyanosis, 5/5 strength in all extremities  SKIN: 3+ Edema grossly in LE b/l, erythematous rash present in abdominal and pelvic skin folds, skin is clean, dry. Right arm midline clean and intact.

## 2022-08-09 NOTE — PROGRESS NOTE ADULT - ASSESSMENT
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 Pathology: Adenocarcinoma of mullerian origin adenocarcinoma, morphologically consistent with endometrioid adenocarcinoma. The possible primary sites include ovary, uterus or the peritoneum  - PDL1 and NGS pending   - CT head no cont: no evidence of metastasis, study limited   - CT Chest w/ IV cont: 15+ pulm nod up to 1.7cm   - CT A/P w/ IV cont: bilateral adnexal hypodensities, para-aortic lymphadenopathy, hypodensity w/in the pelvis repeat study limited due to artifact  - MRI unable to obtain because of patient size   - TVUS: Rt 6.6cm mixed solid-cystic structure; Lt 5.6 cm heterogeneous solid structure may represent ovarian mass or degenerating fibroid  - OBGYN following, no surgical intervention, repeat CTAP study is limited  - GI consulted for elevated CEA, will f/u recs for possible EGD/colonoscopy  - US breast b/l r/o mass, cancelled by radiology stating they can not do diagnositc mammogram inpatient, will f/u   - pain management with oxycodone 5mg q4hr PRN and 7.5mg q4hr PRN for moderate and severe pain respectively   - Heme/Onc starting carb/taxol 8/8/22 - next cycle planned 8/29/22    #Nephrotoxicity/GOYO  #Elevated Alk Phos  - s/p chemotherapy 8/8/22  - Cr increase from 1.0->2.2  - d/c lasix for now, started on NS @75cc  - nephro consulted, Renal US and urine lytes ordered  - Alk phos increase from 400s ->1588 3x increase  - RUQ US ordered, GI following   - repeat CBC and CMP tomorrow       #Generalized weakness  #Fall  #covid+ prior admission  - general weakness from prior covid infections  - 1st covid +ve 7/21 needs 21 days of isolation to 8/11  - s/p paxlovid outpatient  - bed bound, mobility improving with exercises in bed, PT assessment   - c/w PT possible d/c to STR    #Hypercalcemia  #Hypophosphatemia, resolved   #GOYO/HAGMA   #Hypokalemia, resolved  - K 4.1, phos 3.6, AGAP 24  - Ca 8.4 in setting of low albumin (previously 11)  - suspected PTHrP and mulnutrition w/ refeeding syndrome  - iPTH <20, PTHrP 11  - can replete phosphate with dairy/meat  - s/p pamidronate 90mg 1x previous admission  - zofran 4mg PO q6hr PRN  - Uric acid 4.5 >11, adjusted allopurinol 300mg po 24   - possibly 2/2 tumor burden (WBC 50k w/ NT predominance)  - NaHCO3 650mg po Q8hr, NS @ 75cc   - repeating CBC and CMP tomorrow will add mg and phos  - neutraphos d/c - pt stating it was causing diarrhea     #Diarrhea, improving  - gi pcr and c. diff negative  - c/w on loperamide 2mg po q4hr     #leukocytosis and tachycardia - SIRS positive  - suspected in setting of malignancy  - Bcx negative  - monitor off Abx  - BCR/ABL, JAK2, flow cytometry, CALR, MPL mutation all negative  - possible BM Bx OP    # microcytic hypochromic anemia  - Last admission:   iron sat 10%, TIBC 314, Ferr 85 = could be iron def  - FeSO4 325mg po q24  - B12, Fol nl  - hapto 262  - MMA level normal 337  - retic 2.7%; t red 0.3  -  - this is NOT hemolysis, even intramedullary; possible marker for lymphoma or tumor burden    # lupus - not in active flare  #antiphospholipid syndrome  - c/w HCQ 200mg po q12   - c/w cymbalta  - c/w asa   - no hx of clots; only 1 miscarriage  - no anticoagulation     # HTN  #leg edema  #GOYO  - Cr improved 1.0 > 1.5 baseline ~0.8  - Renal US no hydronephrosis  - nifedipine d/c  - lasix 20mg pod    # Hypothyroidism  - c/w levothyroxine 150mg oral daily  - TSH 8.61 - f/u Free T4 nl and low T3 - suspected euthyroid sick syndrome    #abnormal LFTs  #Alk phos elevation   - RUQ US - GB stones w/out cholecystitis  - repeating RUQ US    #Misc  - DVT Prophylaxis: hep SQ TID   - Diet: DASH/TLC  - GI Prophylaxis: none  - Activity: bed bound  - IV Fluids: off  - Code Status: FULL    Dispo: SNF vs home pending first cycle of chemo

## 2022-08-09 NOTE — PROGRESS NOTE ADULT - SUBJECTIVE AND OBJECTIVE BOX
KELSIE JUAREZ 57y Female  MRN#: [id number]     SUBJECTIVE  Patient is a 57y old Female who presents with a chief complaint of metastatic adenocarcinoma (08 Aug 2022 21:11)      Today is hospital day 10d, and this morning she is lying in bed without distress.   No acute overnight events.     OBJECTIVE  PAST MEDICAL & SURGICAL HISTORY  Lupus    Hypothyroidism    Iron deficiency anemia    No significant past surgical history      ALLERGIES:  NSAIDs (Stomach Upset)    MEDICATIONS:  STANDING MEDICATIONS  allopurinol 300 milliGRAM(s) Oral daily  aspirin  chewable 81 milliGRAM(s) Oral daily  dextrose 50% Injectable 12.5 Gram(s) IV Push once  dextrose 50% Injectable 25 Gram(s) IV Push once  DULoxetine 60 milliGRAM(s) Oral daily  enoxaparin Injectable 40 milliGRAM(s) SubCutaneous every 24 hours  ferrous    sulfate 325 milliGRAM(s) Oral daily  furosemide    Tablet 20 milliGRAM(s) Oral daily  glucagon  Injectable 1 milliGRAM(s) IntraMuscular once  hydroxychloroquine 200 milliGRAM(s) Oral two times a day  levothyroxine 150 MICROGram(s) Oral daily  loperamide 2 milliGRAM(s) Oral every 4 hours  nystatin Powder 1 Application(s) Topical two times a day  sodium bicarbonate 650 milliGRAM(s) Oral every 8 hours    PRN MEDICATIONS  dextrose Oral Gel 15 Gram(s) Oral once PRN  oxyCODONE    IR 5 milliGRAM(s) Oral every 4 hours PRN  oxyCODONE    IR 7.5 milliGRAM(s) Oral every 4 hours PRN  simethicone 80 milliGRAM(s) Chew four times a day PRN    HOME MEDICATIONS  Home Medications:  aspirin 81 mg oral tablet, chewable: 1 tab(s) orally once a day (22 Jul 2022 13:38)  DULoxetine 60 mg oral delayed release capsule: 1 cap(s) orally once a day (14 Jul 2022 22:46)  hydroxychloroquine 200 mg oral tablet: 1 tab(s) orally 2 times a day (14 Jul 2022 22:44)  lisinopril 40 mg oral tablet: 1 tab(s) orally once a day (14 Jul 2022 22:44)  oxycodone-acetaminophen 5 mg-325 mg oral tablet: 1 tab(s) orally every 4 hours, As needed, Moderate Pain (4 - 6) (22 Jul 2022 13:38)  Synthroid 150 mcg (0.15 mg) oral tablet: 1 tab(s) orally once a day (14 Jul 2022 22:46)      VITAL SIGNS: Last 24 Hours  T(C): 36.5 (09 Aug 2022 05:39), Max: 37.1 (08 Aug 2022 19:58)  T(F): 97.7 (09 Aug 2022 05:39), Max: 98.8 (08 Aug 2022 19:58)  HR: 105 (09 Aug 2022 08:05) (104 - 109)  BP: 118/58 (09 Aug 2022 05:39) (104/55 - 131/60)  BP(mean): --  RR: 18 (08 Aug 2022 19:58) (18 - 18)  SpO2: 94% (09 Aug 2022 08:05) (94% - 98%)      LABS:                        9.0    30.93 )-----------( 247      ( 09 Aug 2022 08:19 )             27.7     08-09    137  |  99  |  36<H>  ----------------------------<  102<H>  4.1   |  14<L>  |  2.2<H>    Ca    8.4<L>      09 Aug 2022 08:19  Phos  3.6     08-09  Mg     2.1     08-09    TPro  5.5<L>  /  Alb  2.6<L>  /  TBili  1.3<H>  /  DBili  x   /  AST  101<H>  /  ALT  22  /  AlkPhos  1588<H>  08-09    LIVER FUNCTIONS - ( 09 Aug 2022 08:19 )  Alb: 2.6 g/dL / Pro: 5.5 g/dL / ALK PHOS: 1588 U/L / ALT: 22 U/L / AST: 101 U/L / GGT: x                         CAPILLARY BLOOD GLUCOSE          RADIOLOGY:    PHYSICAL EXAM:  PHYSICAL EXAM:  GENERAL: NAD, AAO x 4, 57y F  HEAD:  Atraumatic, Normocephalic  EYES: EOMI, conjunctivae clear and sclerae white  NECK: Supple, No JVD  CHEST/LUNG: Clear to auscultation bilaterally; No wheeze; No crackles; No accessory muscles used  HEART: Regular rate and rhythm; No murmurs;   ABDOMEN: Soft, Nontender, Nondistended; Bowel sounds present; No guarding  EXTREMITIES:  2+ Peripheral Pulses, No cyanosis or edema  NEUROLOGY: non-focal    ADMISSION SUMMARY  Patient is a 57y old Female who presents with a chief complaint of metastatic adenocarcinoma (08 Aug 2022 21:11)

## 2022-08-09 NOTE — PROGRESS NOTE ADULT - ASSESSMENT
Pt is a 56 y/o F with PMHx of lupus, hypothyroidism, antiphospholipid syndrome, HTN, and anemia who presented to the ER complaining of diffuse pelvic pain and back pain x 3 weeks associated with decreased appetite and vomiting over the last 8 days.    # COVID + on last admit  was exposed 7/16 to another pt in hosp w/ COVID;  also had COVID  1st COVID + 7/21 -> 21 days of isolation -> to 8/11 (Thurs)  pt s/p Paxlovid as outpt recently  seems asymp, except altered taste and smell  NOT on O2     # diarrhea - not sure why - poss irritation from tumor vs other; metab acidosis  C Diff neg; GI pcr neg  imodium 2mg po q4 for now (not prn) - helping  d/c neutraphos pkts - pt says this is contributing to diarrhea  incr NaHCO3 650mg po q8    GOYO  creatinine elevated  started on gentle hydration  also received chemo yesterday but as per onco unlikely the cause of GOYO  nephrology consulted, follow recommendations       # leg edema; HTN  lisinopril off (had GOYO)  d/c Nifed - BP getting on low side  lasix 20mg po q24    # hypokalemia  f/u BMP    # hypophosphatemia  prob effect of PTHRP and malnutrition w/ refeeding syndrome  pt refused neutraphos pkt 2/2 diarrhea  give dairy (milk) or meat to incr dietary phos    # hypercalcemia - much better now  iPTH < 20 -> hyperpara RULED OUT  likely malign in origin - PTHrP 11 (nl <2) and + adenoCa on bx (PTHrP is secreted by many tumors - ie, not specific)  s/p Pamidronate 90mg iv x1 last admit  25 OH Vit D 74 (upper limit of nl)    # confirmed malignancy (adenoCa of Mullerian Origin (endometroid vs ovarian)?): b/l adnexal lesions (primary vs metastatic ([place])); paraaortic LN; 15+ pulm nodules;   GI eval: might do upper/lower endo this week  CT C: 15+ pulm nod up to 1.7cm - IR s/p bx of lung nod 7/21  path: + adenoCa, Mullerian Origin (?)  TVUS: Rt 6.6 cm mixed solid-cystic structure; Lt 5.6 cm heterog, solid structure w/ doppler flow   CT A/P: bilateral adnexal hypodensities, para-aortic lymphadenopathy as well as bilateral pulm nodules  rpt CT A/P (ordered w/ IV cont, but rads did w/out IV Cont): show incr pulm mets and incr size of pelvic pathology  CT H: ordered w/ IV cont, but rads did w/out IV cont): no mets  Pt is too large to fit into MRI gantry: so MRI B and MRI pelvis cannot be done  Breast U/S ordered: rads refuses to do as inpt, so this test will have to be outpt  will hold off on mammogram (logistically too hard to obtain in obese pt who cannot stand and machine in another building)  s/p first chemo   Gyn/Onc noted  : 33 (nl); CA 19-9: 19 (nl); CEA 5.5; ; HCG < 0.6; S preg neg; ESR 80;  uric acid 6.7 -> 11.5 -> 4.5 (prob cell turnover) - decr allopurinol 300mg po q24 (f/u h/o)   Inhibin A: neg; B: slightly elevated   f/u CA 15-3 and CA 27-29    # neopl-related pain  oxy ir 5mg po q4 prn mod pain  oxy ir 7.5mg po q4 prn sev pain  d/c percocet    # marked leukocytosis and tachycardia = SIRS 2/2 malign  prob reactive   bld cx all neg   no abx for now  BCR/ABL neg; JAK2 neg; flow cyto neg; CALR neg; MPL mut analysis neg     # microcytic anemia  prob of malign  iron sat 10%, TIBC 314, Ferr 85 = could be iron def  no IV iron for now given SIRS  FeSO4 325mg po q24  B12, Fol nl  hapto 262   (upper nl)  retic 2.7%; t red 0.3   - this is NOT hemolysis, even intramedullary; I suspect this is a marker for malign or cell turnover  GI eval  plan for EGD and colonoscopy on 8/11. needs bowel prep as per GI recommendations     # abnl LFTs mild (incr AP and slight incr AST)  increase in alkaline phosphatase   prob related to underlying malign  RUQ U/S: GB stones w/out cholecystitis  follow imaging     # lupus - does not seem active currently  c/w HCQ 200mg po q12  c/w cymbalta    # antiphospholipid syndrome  prob just element of lupus hx  no clots; only 1 miscarriage  cont asa   no a/c    # Hypothyroidism  c/w levothyroxine 150mg oral daily  TSH 8.61 - would NOT raise dose given tachycardia  free T4 nl and free T3 low (prob sick euthyroid)    # no hx of DM  d/c all FS - they are always nl    # DVT ppx: LMWH (d/c hep sc); Cr better    # GI ppx: none    # Activity: used to walk and cannot now; s/p fall at home; PT eval    # Code: full    Dispo: f/u GI eval; f/u H/O re chemo; f/u breast tumor markers; tx pain; tx diarrhea;   eventually, pt will need STR, either 4A or SNF vs home - follow nephrology recommendations , daily BMP     Prog is very guarded.

## 2022-08-10 NOTE — CONSULT NOTE ADULT - ASSESSMENT
56 y/o F with PMHx of lupus, hypothyroidism, antiphospholipid syndrome, HTN, and anemia complaining of pelvic and back pain for 3 weeks with decreased appetite and vomiting for 8 days admitted for progressive generalized weakness and fall found to have malignancy adenocarcinoma of mullerian origin with mets to lungs s/p chemo session on 8/8 with carbo and taxol. She developed GOYO the second day and today she was in shock with severe metabolic acidosis. She was upgraded to ICU, intubated and started on pressors.     Assessment and plan  GOYO/ shock ? septic/ HAGMA/ Stage 4 mullerian cancer s/p chemo/ Hx of SLE  patient decompensated 2 days after chemotherapy session   GOYO multifactorial, prerenal/ATN secondary to shock, chemo toxicity, ? AIN  Less likely TLS with normal uric acid and mild elevation of K  HAGMA with lactate of 14  s/p 2 L of crystalloids   c/w bicarb drip 200 cc/hr  repeat BMP and VBG in 2 hours  if worsening acidosis patient will need CVVHD  abx  no hydro on US  medications adjusted to eGFR  will follow

## 2022-08-10 NOTE — CHART NOTE - NSCHARTNOTEFT_GEN_A_CORE
ICU Transfer Note    Transfer from: 3B    Transfer to: (  ) Medicine    (  ) Telemetry    (  ) RCU                               (  ) Palliative    (  ) Stroke Unit    (X ) MICU    (  ) __________________    Accepting Physician: Dr. 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 During her stay pt had lung nodule biopsy +ve for adenoCA of mullerian origin. CT scan revealed multiple lung nodules, paraaortic LAD, and liver nodules suggestive of metastatic disease. Heme/onc and gyn onc were consulted. MRI investigation was halted due to pt's large BMI she was unable to fit in MRI. No surgical intervention/pelvic debulking was recommended due to metastatic spread. Heme/onc approved chemotherapy and patient had first round on 8/8/22 of taxol/carboplatin. After her first session she was noticed to be lethargic, labs showed new GOYO Cr 1.0->2.2 and elevated AlkPhos 400s->1500. Nephro consulted, renal and liver ultrasound ordered, pt was started on maintenance fluids. This morning 8/10/22 pt was very lethargic A&Ox2 (baseline AAOx3). BP was 90s/50s MAP 64, tachycardic 111s, tachypneic >20s saturating low 90s on RA, AGAP 24. Rapid response team was called. Pt was given 1L bolus NS, central line (femoral access) started on levophed titrated to .12 BP now 74/61 MAP 66, . On 15L NRB saturating 100%. 1 amp of bicarb was given. EKG showed sinus tachycardia. Complete labs, BCx ordered. Pt started on vancomycin and meropenem. D-dimer 1600,       Vital Signs Last 24 Hrs  T(C): 36.1 (10 Aug 2022 07:00), Max: 36.4 (09 Aug 2022 19:59)  T(F): 96.9 (10 Aug 2022 07:00), Max: 97.5 (09 Aug 2022 19:59)  HR: 111 (10 Aug 2022 07:00) (102 - 111)  BP: 92/51 (10 Aug 2022 07:00) (92/51 - 123/57)  BP(mean): --  RR: 18 (10 Aug 2022 07:00) (18 - 18)  SpO2: 96% (09 Aug 2022 19:59) (96% - 96%)    Parameters below as of 09 Aug 2022 19:59  Patient On (Oxygen Delivery Method): room air        I&O's Summary      Physical Exam:       LABS:   CARDIAC MARKERS ( 10 Aug 2022 08:34 )  x     / <0.01 ng/mL / x     / x     / x                                  9.0    30.93 )-----------( 247      ( 09 Aug 2022 08:19 )             27.7       08-09    137  |  99  |  36<H>  ----------------------------<  102<H>  4.1   |  14<L>  |  2.2<H>    Ca    8.4<L>      09 Aug 2022 08:19  Phos  3.6     08-09  Mg     2.1     08-09    TPro  5.5<L>  /  Alb  2.6<L>  /  TBili  1.3<H>  /  DBili  x   /  AST  101<H>  /  ALT  22  /  AlkPhos  1588<H>  08-09                ECG:    Telemetry:    Imaging:      ASSESSMENT & PLAN:           FOR FOLLOW UP:  [ ]   [ ]   [ ]     Itzel Carbajal MD PGY1 ICU Transfer Note    Transfer from: 3B    Transfer to: (  ) Medicine    (  ) Telemetry    (  ) RCU                               (  ) Palliative    (  ) Stroke Unit    (X ) MICU    (  ) __________________    Accepting Physician: Dr. 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 During her stay pt had lung nodule biopsy +ve for adenoCA of mullerian origin. CT scan revealed multiple lung nodules, paraaortic LAD, and liver nodules suggestive of metastatic disease. Heme/onc and gyn onc were consulted. MRI investigation was halted due to pt's large BMI she was unable to fit in MRI. No surgical intervention/pelvic debulking was recommended due to metastatic spread. Heme/onc approved chemotherapy and patient had first round on 8/8/22 of taxol/carboplatin. After her first session she was noticed to be lethargic, labs showed new GOYO Cr 1.0->2.2 and elevated AlkPhos 400s->1500. Nephro consulted, renal and liver ultrasound ordered, pt was started on maintenance fluids. This morning 8/10/22 pt was very lethargic A&Ox2 (baseline AAOx3). BP was 90s/50s MAP 64, tachycardic 111s, tachypneic >20s saturating low 90s on RA, AGAP 24. Rapid response team was called. Pt was given 1L bolus NS, central line (femoral access) started on levophed titrated to .12 BP now 74/61 MAP 66, . On 15L NRB saturating 100%. 1 amp of bicarb was given. EKG showed sinus tachycardia. Complete labs, BCx ordered. Pt started on vancomycin and meropenem. D-dimer 1600, WBC drop from 30k->8k, Cr rise from 1.0->2.2->3.2, LDH>2500 hgb stable 8.4 troponin -ve, uric acid wnl. Initial ABG pH 7.16, HCO3 10, CO2 51, Lactate 14. Patient started on D5W w/ sodium bicarbonate infusion. Admitted to ICU for further workup of sepsis in the setting of suspected gram negative bacteremia vs lactic acidosis 2/2 large tumor burden/necrosis s/p chemotherapy and rhabdomyolysis. Patient remains FULL CODE      Vital Signs Last 24 Hrs  T(C): 36.1 (10 Aug 2022 07:00), Max: 36.4 (09 Aug 2022 19:59)  T(F): 96.9 (10 Aug 2022 07:00), Max: 97.5 (09 Aug 2022 19:59)  HR: 111 (10 Aug 2022 07:00) (102 - 111)  BP: 92/51 (10 Aug 2022 07:00) (92/51 - 123/57)  BP(mean): --  RR: 18 (10 Aug 2022 07:00) (18 - 18)  SpO2: 96% (09 Aug 2022 19:59) (96% - 96%)    Parameters below as of 09 Aug 2022 19:59  Patient On (Oxygen Delivery Method): room air        I&O's Summary      Physical Exam:       LABS:   CARDIAC MARKERS ( 10 Aug 2022 08:34 )  x     / <0.01 ng/mL / x     / x     / x                                  9.0    30.93 )-----------( 247      ( 09 Aug 2022 08:19 )             27.7       08-09    137  |  99  |  36<H>  ----------------------------<  102<H>  4.1   |  14<L>  |  2.2<H>    Ca    8.4<L>      09 Aug 2022 08:19  Phos  3.6     08-09  Mg     2.1     08-09    TPro  5.5<L>  /  Alb  2.6<L>  /  TBili  1.3<H>  /  DBili  x   /  AST  101<H>  /  ALT  22  /  AlkPhos  1588<H>  08-09                ECG: Sinus tachycardia  Imaging: Chest Xray: decreased lung volumes b/l, moderate L pleural effusion (pending official read)      ASSESSMENT & PLAN:   # 56 y/o F with PMHx of lupus, hypothyroidism, antiphospholipid syndrome, HTN, and anemia complaining of pelvic and back pain for 3 weeks with decreased appetite and vomiting for 8 days admitted for progressive generalized weakness and fall found to have malignancy adenocarcinoma of mullerian origin, found this AM to be hypotensive, tachycardic, tachypneic with suspected septic shock vs. rhabdomyolysis.       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 Pathology: Adenocarcinoma of mullerian origin adenocarcinoma, morphologically consistent with endometrioid adenocarcinoma. The possible primary sites include ovary, uterus or the peritoneum  - PDL1 and NGS pending   - CT head no cont: no evidence of metastasis, study limited   - CT Chest w/ IV cont: 15+ pulm nod up to 1.7cm   - CT A/P w/ IV cont: bilateral adnexal hypodensities, para-aortic lymphadenopathy, hypodensity w/in the pelvis repeat study limited due to artifact  - MRI unable to obtain because of patient size   - TVUS: Rt 6.6cm mixed solid-cystic structure; Lt 5.6 cm heterogeneous solid structure may represent ovarian mass or degenerating fibroid  - OBGYN following, no surgical intervention, repeat CTAP study is limited  - GI consulted for elevated CEA, will f/u recs for possible EGD/colonoscopy  - US breast b/l r/o mass, cancelled by radiology stating they can not do diagnositc mammogram inpatient, will f/u   - pain management with oxycodone 5mg q4hr PRN and 7.5mg q4hr PRN for moderate and severe pain respectively   - Heme/Onc starting carb/taxol 8/8/22 - next cycle planned 8/29/22    #Suspected Septic shock/rhabdomyolysis/tumor necrosis 2/2 chemotherapy  - paclitaxol/carboplatin 8/8/22  - ABG metabolic acidosis pH 7. 16, lactate 14, bicarb 10.   - on bicarb drip, vasopressin, 15L NRB sat 98%  - , LDH >2500, Phosphorus 7.0, uric acid wnl, troponin -ve  - D-dimer 1595  - started on 2g Vancomycin, 1g Meropenem 1 dose each  - pending b/l duplex   - nephro consulted, f/u if pt would benefit from dialysis  - ID consult  - heme /onc following   - palliative consult   - f/u STAT labs, cbc, cmp, coags, BCx  - f/u RUQ US and renal US    #Generalized weakness  #Fall  #covid+ prior admission  - general weakness from prior covid infections  - 1st covid +ve 7/21 needs 21 days of isolation to 8/11  - s/p paxlovid outpatient  - bed bound, mobility improving with exercises in bed, PT assessment   - c/w PT possible d/c to STR    #Hypercalcemia  #Hypophosphatemia, resolved   #GOYO/HAGMA   #Hypokalemia, resolved  - K 5.1, phos 7.0, AGAP 32  - Ca 9.8 corrected  in setting of low albumin (previously 11)  - suspected PTHrP and mulnutrition w/ refeeding syndrome  - iPTH <20, PTHrP 11  - s/p pamidronate 90mg 1x previous admission  - zofran 4mg PO q6hr PRN  - Uric acid 4.5 >11, adjusted allopurinol 300mg po 24   - possibly 2/2 tumor burden (WBC 50k w/ NT predominance)  - Bicarb drip D5W    #Diarrhea, improving  - gi pcr and c. diff negative  - c/w on loperamide 2mg po q4hr     # microcytic hypochromic anemia  - Last admission:   iron sat 10%, TIBC 314, Ferr 85 = could be iron def  - FeSO4 325mg po q24  - B12, Fol nl  - hapto 262  - MMA level normal 337  - retic 2.7%; t red 0.3    # lupus - not in active flare  #antiphospholipid syndrome  - c/w HCQ 200mg po q12   - c/w cymbalta  - c/w asa   - no hx of clots; only 1 miscarriage  - no anticoagulation     # HTN  #leg edema  - nifedipine d/c  - holding lasix     # Hypothyroidism  - c/w levothyroxine 150mg oral daily  - TSH 8.61 - f/u Free T4 nl and low T3 - suspected euthyroid sick syndrome    Laci Ambriz PGY1 ICU Transfer Note    Transfer from: 3B    Transfer to: (  ) Medicine    (  ) Telemetry    (  ) RCU                               (  ) Palliative    (  ) Stroke Unit    (X ) MICU    (  ) __________________    Accepting Physician: Dr. 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 During her stay pt had lung nodule biopsy +ve for adenoCA of mullerian origin. CT scan revealed multiple lung nodules, paraaortic LAD, and liver nodules suggestive of metastatic disease. Heme/onc and gyn onc were consulted. MRI investigation was halted due to pt's large BMI she was unable to fit in MRI. No surgical intervention/pelvic debulking was recommended due to metastatic spread. Heme/onc approved chemotherapy and patient had first round on 8/8/22 of taxol/carboplatin. After her first session she was noticed to be lethargic, labs showed new GOYO Cr 1.0->2.2 and elevated AlkPhos 400s->1500. Nephro consulted, renal and liver ultrasound ordered, pt was started on maintenance fluids. This morning 8/10/22 pt was very lethargic A&Ox2 (baseline AAOx3). BP was 90s/50s MAP 64, tachycardic 111s, tachypneic >20s saturating low 90s on RA, AGAP 24. Rapid response team was called. Pt was given 1L bolus NS, central line (femoral access) started on levophed titrated to .12 BP now 74/61 MAP 66, . On 15L NRB saturating 100%. 1 amp of bicarb was given. EKG showed sinus tachycardia. Complete labs, BCx ordered. Pt started on vancomycin and meropenem. D-dimer 1600, WBC drop from 30k->8k, Cr rise from 1.0->2.2->3.2, LDH>2500 hgb stable 8.4 troponin -ve, uric acid wnl. Initial ABG pH 7.16, HCO3 10, CO2 51, Lactate 14. Patient started on D5W w/ sodium bicarbonate infusion. Admitted to ICU for further workup of sepsis in the setting of suspected gram negative bacteremia vs lactic acidosis 2/2 large tumor burden/necrosis s/p chemotherapy and rhabdomyolysis. Patient remains FULL CODE      Vital Signs Last 24 Hrs  T(C): 36.1 (10 Aug 2022 07:00), Max: 36.4 (09 Aug 2022 19:59)  T(F): 96.9 (10 Aug 2022 07:00), Max: 97.5 (09 Aug 2022 19:59)  HR: 111 (10 Aug 2022 07:00) (102 - 111)  BP: 92/51 (10 Aug 2022 07:00) (92/51 - 123/57)  BP(mean): --  RR: 18 (10 Aug 2022 07:00) (18 - 18)  SpO2: 96% (09 Aug 2022 19:59) (96% - 96%)    Parameters below as of 09 Aug 2022 19:59  Patient On (Oxygen Delivery Method): room air        I&O's Summary      Physical Exam:       LABS:   CARDIAC MARKERS ( 10 Aug 2022 08:34 )  x     / <0.01 ng/mL / x     / x     / x                                  9.0    30.93 )-----------( 247      ( 09 Aug 2022 08:19 )             27.7       08-09    137  |  99  |  36<H>  ----------------------------<  102<H>  4.1   |  14<L>  |  2.2<H>    Ca    8.4<L>      09 Aug 2022 08:19  Phos  3.6     08-09  Mg     2.1     08-09    TPro  5.5<L>  /  Alb  2.6<L>  /  TBili  1.3<H>  /  DBili  x   /  AST  101<H>  /  ALT  22  /  AlkPhos  1588<H>  08-09                ECG: Sinus tachycardia  Imaging: Chest Xray: decreased lung volumes b/l, moderate L pleural effusion (pending official read)      ASSESSMENT & PLAN:   # 58 y/o F with PMHx of lupus, hypothyroidism, antiphospholipid syndrome, HTN, and anemia complaining of pelvic and back pain for 3 weeks with decreased appetite and vomiting for 8 days admitted for progressive generalized weakness and fall found to have malignancy adenocarcinoma of mullerian origin, found this AM to be hypotensive, tachycardic, tachypneic with suspected septic shock vs. rhabdomyolysis.       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 Pathology: Adenocarcinoma of mullerian origin adenocarcinoma, morphologically consistent with endometrioid adenocarcinoma. The possible primary sites include ovary, uterus or the peritoneum  - PDL1 and NGS pending   - CT head no cont: no evidence of metastasis, study limited   - CT Chest w/ IV cont: 15+ pulm nod up to 1.7cm   - CT A/P w/ IV cont: bilateral adnexal hypodensities, para-aortic lymphadenopathy, hypodensity w/in the pelvis repeat study limited due to artifact  - MRI unable to obtain because of patient size   - TVUS: Rt 6.6cm mixed solid-cystic structure; Lt 5.6 cm heterogeneous solid structure may represent ovarian mass or degenerating fibroid  - OBGYN following, no surgical intervention, repeat CTAP study is limited  - GI consulted for elevated CEA, will f/u recs for possible EGD/colonoscopy  - US breast b/l r/o mass, cancelled by radiology stating they can not do diagnositc mammogram inpatient, will f/u   - pain management with oxycodone 5mg q4hr PRN and 7.5mg q4hr PRN for moderate and severe pain respectively   - Heme/Onc starting carb/taxol 8/8/22 - next cycle planned 8/29/22    #Suspected Septic shock/rhabdomyolysis/tumor necrosis 2/2 chemotherapy  - paclitaxol/carboplatin 8/8/22  - ABG metabolic acidosis pH 7. 16, lactate 14, bicarb 10.   - on bicarb drip, vasopressin, 15L NRB sat 98%  - , LDH >2500, Phosphorus 7.0, uric acid wnl, troponin -ve  - D-dimer 1595  - started on 2g Vancomycin, 1g Meropenem 1 dose each  - pending b/l duplex   - nephro consulted, f/u if pt would benefit from dialysis  - ID consult  - heme /onc following   - palliative consult   - f/u STAT labs, cbc, cmp, coags, BCx  - f/u RUQ US and renal US    #Generalized weakness  #Fall  #covid+ prior admission  - general weakness from prior covid infections  - 1st covid +ve 7/21 needs 21 days of isolation to 8/11  - s/p paxlovid outpatient  - bed bound, mobility improving with exercises in bed, PT assessment   - c/w PT possible d/c to STR    #Hypercalcemia  #Hypophosphatemia, resolved   #GOYO/HAGMA   #Hypokalemia, resolved  - K 5.1, phos 7.0, AGAP 32  - Ca 9.8 corrected  in setting of low albumin (previously 11)  - suspected PTHrP and mulnutrition w/ refeeding syndrome  - iPTH <20, PTHrP 11  - s/p pamidronate 90mg 1x previous admission  - zofran 4mg PO q6hr PRN  - Uric acid 4.5 >11, adjusted allopurinol 300mg po 24   - possibly 2/2 tumor burden (WBC 50k w/ NT predominance)  - Bicarb drip D5W    #Diarrhea, improving  - gi pcr and c. diff negative  - c/w on loperamide 2mg po q4hr     # microcytic hypochromic anemia  - Last admission:   iron sat 10%, TIBC 314, Ferr 85 = could be iron def  - FeSO4 325mg po q24  - B12, Fol nl  - hapto 262  - MMA level normal 337  - retic 2.7%; t red 0.3    # lupus - not in active flare  #antiphospholipid syndrome  - c/w HCQ 200mg po q12   - c/w cymbalta  - c/w asa   - no hx of clots; only 1 miscarriage  - no anticoagulation     # HTN  #leg edema  - nifedipine d/c  - holding lasix     # Hypothyroidism  - c/w levothyroxine 150mg oral daily  - TSH 8.61 - f/u Free T4 nl and low T3 - suspected euthyroid sick syndrome    Laci Ambriz PGY1    Attending Addendum:  Rapid response was called in AM due to hypotension. Patient was given IV fluids and was given one dose of abx as well. also given bicarb. blood worked reveal lactic acidosis, (chemo 8/8, sepsis? Patient was started on pressors to improve the BP. ICU/critical care attending was consulted who accepted the patient. I talked to pulmonary attending as well. Patient was started on bicarb drip and transferred to ICU. I updated father who was sitting at nursing station and updated him regarding critical condition of patient. Patient was transferred to ICU. ID was consulted

## 2022-08-10 NOTE — CONSULT NOTE ADULT - SUBJECTIVE AND OBJECTIVE BOX
NEPHROLOGY CONSULTATION NOTE    56 y/o F with PMHx of lupus, hypothyroidism, antiphospholipid syndrome, HTN, and anemia complaining of pelvic and back pain for 3 weeks with decreased appetite and vomiting for 8 days admitted for progressive generalized weakness and fall found to have malignancy adenocarcinoma of mullerian origin s/p chemo session on 8/8 with carbo and taxol. She developed GOYO the second day and today she was in shock with severe metabolic acidosis. She was upgraded to ICU, intubated and started on pressors.   Patient remains hypotensive on levophed, vasopressin added.  She is encephalopathic with lethargy and inability to keep her eyes open.    PAST MEDICAL & SURGICAL HISTORY:  Lupus      Hypothyroidism      Iron deficiency anemia      No significant past surgical history        Allergies:  NSAIDs (Stomach Upset)    Home Medications Reviewed  Hospital Medications:   MEDICATIONS  (STANDING):  allopurinol 300 milliGRAM(s) Oral daily  aspirin  chewable 81 milliGRAM(s) Oral daily  chlorhexidine 0.12% Liquid 15 milliLiter(s) Oral Mucosa every 12 hours  chlorhexidine 2% Cloths 1 Application(s) Topical <User Schedule>  dextrose 5% 1000 milliLiter(s) (200 mL/Hr) IV Continuous <Continuous>  DULoxetine 60 milliGRAM(s) Oral daily  enoxaparin Injectable 40 milliGRAM(s) SubCutaneous every 24 hours  fentaNYL    Injectable 50 MICROGram(s) IV Push once  fentaNYL   Infusion... 0.5 MICROgram(s)/kG/Hr (4.4 mL/Hr) IV Continuous <Continuous>  ferrous    sulfate 325 milliGRAM(s) Oral daily  hydrocortisone sodium succinate Injectable 100 milliGRAM(s) IV Push every 8 hours  hydroxychloroquine 200 milliGRAM(s) Oral two times a day  levothyroxine 150 MICROGram(s) Oral daily  loperamide 2 milliGRAM(s) Oral every 4 hours  midazolam Infusion 0.02 mG/kG/Hr (3.52 mL/Hr) IV Continuous <Continuous>  midazolam Injectable 2 milliGRAM(s) IV Push once  norepinephrine Infusion 0.05 MICROgram(s)/kG/Min (8.25 mL/Hr) IV Continuous <Continuous>  nystatin Powder 1 Application(s) Topical two times a day  pantoprazole  Injectable 40 milliGRAM(s) IV Push daily  sodium bicarbonate  Injectable 150 milliEquivalent(s) IV Push once  vasopressin Infusion 0.04 Unit(s)/Min (2.4 mL/Hr) IV Continuous <Continuous>    SOCIAL HISTORY:  Denies ETOH,Smoking,   FAMILY HISTORY:  No pertinent family history in first degree relatives        REVIEW OF SYSTEMS:  CONSTITUTIONAL: lethargic, weak, difficult to evaluate her for a comprehensive ROS given her encephalopathy  EYES/ENT: No visual changes;  No vertigo or throat pain   NECK: No pain or stiffness  RESPIRATORY: No cough, wheezing, hemoptysis; No shortness of breath  CARDIOVASCULAR: No chest pain or palpitations.  GASTROINTESTINAL: abdominal pain  GENITOURINARY: No dysuria, frequency, foamy urine, urinary urgency, incontinence or hematuria  NEUROLOGICAL: encephalopathy   SKIN: No itching, burning, rashes, or lesions   VASCULAR: No bilateral lower extremity edema.   All other review of systems is negative unless indicated above.    VITALS:  Vital Signs Last 24 Hrs  T(C): 36.1 (10 Aug 2022 07:00), Max: 36.4 (09 Aug 2022 19:59)  T(F): 96.9 (10 Aug 2022 07:00), Max: 97.5 (09 Aug 2022 19:59)  HR: 121 (10 Aug 2022 12:30) (102 - 121)  BP: 92/51 (10 Aug 2022 07:00) (92/51 - 123/57)  BP(mean): --  RR: 18 (10 Aug 2022 07:00) (18 - 18)  SpO2: 93% (10 Aug 2022 12:30) (93% - 100%)    Parameters below as of 09 Aug 2022 19:59  Patient On (Oxygen Delivery Method): room air          PHYSICAL EXAM:  Constitutional: lethargic   HEENT: anicteric sclera, oropharynx clear, MMM  Neck: No JVD  Respiratory: intubated  Cardiovascular: tachycardic  Gastrointestinal: BS+, soft, NT/ND  Extremities: cyanosis of the toes with b/l KELLEN  Neurological: lethargic, can open her eyes on verbal stimuli, not able to follow commands  : No CVA tenderness. jones placed.   Skin: No rashes    LABS:  08-10    132<L>  |  95<L>  |  50<H>  ----------------------------<  255<H>  5.1<H>   |  10<L>  |  3.0<H>    Ca    7.7<L>      10 Aug 2022 11:55  Phos  7.0     08-10  Mg     2.6     08-10    TPro  4.9<L>  /  Alb  2.3<L>  /  TBili  1.3<H>  /  DBili  1.0<H>  /  AST  637<H>  /  ALT  62<H>  /  AlkPhos  1396<H>  08-10    Creatinine Trend: 3.0 <--, 3.2 <--, 2.2 <--, 1.0 <--, 1.1 <--, 1.2 <--, 1.5 <--, 1.4 <--, 1.2 <--, 1.0 <--, 1.1 <--, 1.3 <--, 1.4 <--, 1.8 <--, 0.8 <--, 0.8 <--, 0.8 <--, 0.8 <--, 0.8 <--, 0.8 <--, 0.9 <--, 1.0 <--, 1.1 <--                        8.4    8.49  )-----------( 176      ( 10 Aug 2022 07:46 )             26.8     Urine Studies:        07-15 @ 11:27  12.2  18  --

## 2022-08-10 NOTE — PROGRESS NOTE ADULT - PROVIDER SPECIALTY LIST ADULT
Heme/Onc
Heme/Onc
Hospitalist
Hospitalist
Internal Medicine
Gastroenterology
Gyn Onc
Heme/Onc
Internal Medicine
Heme/Onc
Hospitalist
Internal Medicine
Hospitalist
Internal Medicine

## 2022-08-10 NOTE — CONSULT NOTE ADULT - SUBJECTIVE AND OBJECTIVE BOX
ANNKELSIE  57y, Female  Allergy: NSAIDs (Stomach Upset)      CHIEF COMPLAINT: Weakness, diarrhea, mullerian cancer with metastastis (09 Aug 2022 17:26)      LOS  11d    HPI:   56 y/o Female Pt  with a pmhx of lupus, hypothyroidism, antiphospholipid syndrome, HTN, anemia who recently dx with metastatic cancer , hypercalcemia, lymphoma , covid treated with paxlovid and morbidily obese. Presents to the ED with increased weakness and falling at home. no head injury or headaches. Patient was unable to lift herself off floor but was unable to get up. patient's   is unable to lift patient due to recent cataract surgery. In the ED, pt was noted to have an elevated WBC count of 49, increased from 28.77 from prior, GOYO noted, pt is covid positive however was recently rx with paxlovid. Pt was seen and examined at bedside, pt denies vomiting, diarrhea, abdominal pain or back pain. no sob, chest pain or weight loss. no rashes (30 Jul 2022 23:37)      INFECTIOUS DISEASE HISTORY:  History as above.   Intubated, now on vaso and levophed.   Found to have dark contents from OG tube on intubation.     PAST MEDICAL & SURGICAL HISTORY:  Lupus      Hypothyroidism      Iron deficiency anemia      No significant past surgical history          FAMILY HISTORY  No pertinent family history in first degree relatives    No pertinent family history in first degree relatives        SOCIAL HISTORY  Social History:  denies smoking, drug or etoh (30 Jul 2022 23:37)        ROS  General: Denies rigors, nightsweats  HEENT: Denies headache, rhinorrhea, sore throat, eye pain  CV: Denies CP, palpitations  PULM: Denies wheezing, hemoptysis  GI: Denies hematemesis, hematochezia, melena  : Denies discharge, hematuria  MSK: Denies arthralgias, myalgias  SKIN: Denies rash, lesions  NEURO: Denies paresthesias, weakness  PSYCH: Denies depression, anxiety    VITALS:  T(F): 99.3, Max: 99.3 (08-10-22 @ 12:00)  HR: 118  BP: 204/144  RR: 25Vital Signs Last 24 Hrs  T(C): 37.4 (10 Aug 2022 12:00), Max: 37.4 (10 Aug 2022 12:00)  T(F): 99.3 (10 Aug 2022 12:00), Max: 99.3 (10 Aug 2022 12:00)  HR: 118 (10 Aug 2022 15:30) (102 - 132)  BP: 204/144 (10 Aug 2022 12:34) (45/32 - 204/144)  BP(mean): 35 (10 Aug 2022 13:50) (35 - 162)  RR: 25 (10 Aug 2022 15:30) (18 - 46)  SpO2: 93% (10 Aug 2022 15:30) (78% - 100%)    Parameters below as of 10 Aug 2022 15:30  Patient On (Oxygen Delivery Method): ventilator    O2 Concentration (%): 100    PHYSICAL EXAM:  Gen: NAD, resting in bed  HEENT: Normocephalic, atraumatic  Neck: supple, no lymphadenopathy  CV: Regular rate & regular rhythm  Lungs: decreased BS at bases, no fremitus  Abdomen: Soft, BS present  Ext: Warm, well perfused  Neuro: non focal, awake  Skin: no rash, no erythema  Lines: no phlebitis    TESTS & MEASUREMENTS:                        8.4    8.49  )-----------( 176      ( 10 Aug 2022 07:46 )             26.8     08-10    132<L>  |  95<L>  |  50<H>  ----------------------------<  255<H>  5.1<H>   |  10<L>  |  3.0<H>    Ca    7.7<L>      10 Aug 2022 11:55  Phos  7.0     08-10  Mg     2.6     08-10    TPro  4.9<L>  /  Alb  2.3<L>  /  TBili  1.3<H>  /  DBili  1.0<H>  /  AST  637<H>  /  ALT  62<H>  /  AlkPhos  1396<H>  08-10      LIVER FUNCTIONS - ( 10 Aug 2022 11:55 )  Alb: 2.3 g/dL / Pro: 4.9 g/dL / ALK PHOS: 1396 U/L / ALT: 62 U/L / AST: 637 U/L / GGT: x               Culture - Blood (collected 08-02-22 @ 12:13)  Source: .Blood None  Final Report (08-07-22 @ 23:00):    No Growth Final    GI PCR Panel, Stool (collected 08-01-22 @ 12:05)  Source: .Stool Feces  Final Report (08-02-22 @ 06:52):    GI PCR Results: NOT detected    *******Please Note:*******    GI panel PCR evaluates for:    Campylobacter, Plesiomonas shigelloides, Salmonella,    Vibrio, Yersinia enterocolitica, Enteroaggregative    Escherichia coli (EAEC), Enteropathogenic E.coli (EPEC),    Enterotoxigenic E. coli (ETEC) lt/st, Shiga-like    toxin-producing E. coli (STEC) stx1/stx2,    Shigella/ Enteroinvasive E. coli (EIEC), Cryptosporidium,    Cyclospora cayetanensis, Entamoeba histolytica,    Giardia lamblia, Adenovirus F 40/41, Astrovirus,    Norovirus GI/GII, Rotavirus A, Sapovirus    Culture - Blood (collected 08-01-22 @ 06:38)  Source: .Blood None  Final Report (08-06-22 @ 18:00):    No Growth Final    Culture - Blood (collected 07-15-22 @ 23:17)  Source: .Blood None  Final Report (07-21-22 @ 07:00):    No Growth Final    Culture - Blood (collected 07-15-22 @ 23:17)  Source: .Blood None  Final Report (07-21-22 @ 07:00):    No Growth Final    Culture - Urine (collected 07-14-22 @ 22:50)  Source: Clean Catch Clean Catch (Midstream)  Final Report (07-18-22 @ 12:02):    50,000 - 99,000 CFU/mL Streptococcus agalactiae (Group B) isolated    Group B streptococci are susceptible to ampicillin,    penicillin and cefazolin, but may be resistant to    erythromycin and clindamycin.    Recommendations for intrapartum prophylaxis for Group B    streptococci are penicillin or ampicillin.        Lactate, Blood: 12.2 mmol/L (08-10-22 @ 09:40)      INFECTIOUS DISEASES TESTING  Clostridium difficile Toxin by PCR: RESULT INTERPRETATION:    Not detected - No Clostridium difficile toxins detected by amplified DNA  PCR.    C. difficile PCR test results should be interpreted only with  consideration of the patient's clinical situation and history.  This test  willdetect the presence of toxigenic C. difficile.  However it cannot be  used as the sole criteria for the diagnosis of antibiotic associated  diarrhea, antibiotic associated colitis, or pseudomembranous colitis.  Colonization with C. difficile may exceed 20% in hospital patients, the  majority of whom are without Toxigenic Clostridium Difficile disease.  Testing is generally not recommended in children below the age of 1 year,  as up to half of healthy infants are asymptomatically colonized with C.  difficile.  In addition, C. difficile PCR testing cannot be used as a  "test of cure" as dead organism nucleic acids will persist and be  detected after treatment.  Successful treatment of C. difficile disease  is determined by resolution of clinical symptoms. Method: [facility]  TOXIGENIC CLOST.DIFFICILE NEGATIVE;  027-NAP1-B1 PRESUMPTIVE NEGATIVE.  By: Real-Time PCR (Polymerase Chain Reaction)  NOTE: This method detects the Toxin B gene (tCdB), the  binary toxin gene (CDT), and the single-base-pair  deletion at nucleotide 117 within the gene encoding  a negative regulator of toxin production (tcdC 117).  The combined presence of genes encoding Toxin B and  binary toxin and the tcdC 117 deletion has been  associated with hypervirulent C. difficile strain  known as 027/NAP1/B1, which has been associated with  severe disease outbreaks in healthcare facilities  worldwide. (08-01-22 @ 12:05)  Procalcitonin, Serum: 0.45 ng/mL (08-01-22 @ 06:38)  COVID-19 PCR: Detected (07-30-22 @ 20:51)  COVID-19 PCR: Detected (07-21-22 @ 06:00)  COVID-19 PCR: NotDetec (07-14-22 @ 19:50)      RADIOLOGY & ADDITIONAL TESTS:  I have personally reviewed the last Chest xray  CXR  Xray Chest 1 View- PORTABLE-Urgent:   ACC: [id number] EXAM:  XR CHEST PORTABLE URGENT 1V                          PROCEDURE DATE:  08/10/2022          INTERPRETATION:  Clinical History / Reason for exam: Shortness of breath.   7/30/2022    Comparison : Chest radiograph 7/30/2022.    Technique/Positioning: Single frontal view the chest.    Findings:    Support devices: None.    Cardiac/mediastinum/hilum: Cardiomegaly.    Lung parenchyma/Pleura: Low lung volumes. Right suprahilar opacity.   Numerous other bilateral nodules better seen on CT.    Skeleton/soft tissues: Stable.    Impression:    Low lung volumes with numerous nodules as seen previously. Right   suprahilar opacity possible lymph node or nodule appears more conspicuous   than on prior exam.        --- End of Report ---            DELMY EMERSON MD; Attending Radiologist  This document has been electronically signed. Aug 10 2022 12:08PM (08-10-22 @ 09:29)      CT      CARDIOLOGY TESTING  12 Lead ECG:   Ventricular Rate 109 BPM    Atrial Rate 109 BPM    P-R Interval 140 ms    QRS Duration 100 ms    Q-T Interval 364 ms    QTC Calculation(Bazett) 490 ms    P Axis 54 degrees    R Axis 52 degrees    T Axis -9 degrees    Diagnosis Line Sinus tachycardia  Right atrial enlargement  Possible Inferior infarct , age undetermined  Abnormal ECG    Confirmed by ELIZABETH GHOTRA MD ([id number]) on 8/10/2022 10:06:39 AM (08-10-22 @ 08:26)  12 Lead ECG:   Ventricular Rate 88 BPM    Atrial Rate 88 BPM    P-R Interval 146 ms    QRS Duration 94 ms    Q-T Interval 430 ms    QTC Calculation(Bazett) 520 ms    P Axis 51 degrees    R Axis 1 degrees    T Axis 47 degrees    Diagnosis Line Normal sinus rhythm  Normal ECG    Confirmed by NERI SÁNCHEZ MD ([id number]) on 7/31/2022 10:15:43 AM (07-30-22 @ 20:01)      MEDICATIONS  allopurinol 300 Oral daily  aspirin  chewable 81 Oral daily  chlorhexidine 0.12% Liquid 15 Oral Mucosa every 12 hours  chlorhexidine 2% Cloths 1 Topical <User Schedule>  CRRT Treatment   <Continuous>  dextrose 5% 1000 IV Continuous <Continuous>  DULoxetine 60 Oral daily  enoxaparin Injectable 40 SubCutaneous every 24 hours  fentaNYL   Infusion. 0.5 IV Continuous <Continuous>  ferrous    sulfate 325 Oral daily  hydrocortisone sodium succinate Injectable 100 IV Push every 8 hours  hydroxychloroquine 200 Oral two times a day  levothyroxine 150 Oral daily  loperamide 2 Oral every 4 hours  midazolam Infusion 0.02 IV Continuous <Continuous>  norepinephrine Infusion 0.05 IV Continuous <Continuous>  nystatin Powder 1 Topical two times a day  pantoprazole  Injectable 40 IV Push daily  PureFlow Dialysate RFP-400 (K 2 / Ca 3) 5000 CRRT <Continuous>  vasopressin Infusion 0.04 IV Continuous <Continuous>      Weight  Weight (kg): 197.7 (08-10-22 @ 11:30)    ANTIBIOTICS:  hydroxychloroquine 200 milliGRAM(s) Oral two times a day      ALLERGIES:  NSAIDs (Stomach Upset)

## 2022-08-10 NOTE — CONSULT NOTE ADULT - ASSESSMENT
ASSESSMENT   56 y/o Female Pt  with a pmhx of lupus, hypothyroidism, antiphospholipid syndrome, HTN, anemia who recently dx with metastatic cancer , hypercalcemia, lymphoma , covid treated with paxlovid and morbidily obese.     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  Spectra [phone]

## 2022-08-10 NOTE — PROCEDURE NOTE - NSINDICATIONS_GEN_A_CORE
venous access
arterial puncture to obtain ABG's/blood sampling/monitoring purposes
critical illness/venous access
critical illness/dialysis/CRRT

## 2022-08-10 NOTE — CONSULT NOTE ADULT - CONSULT REQUESTED DATE/TIME
05-Aug-2022 13:36
03-Aug-2022 17:19
10-Aug-2022 09:59
10-Aug-2022 12:50
10-Aug-2022 16:46
03-Aug-2022 16:58

## 2022-08-10 NOTE — CHART NOTE - NSCHARTNOTEFT_GEN_A_CORE
pt previously scheduled for colonoscopy/EGD tomorrow. upgraded to ICU for shock and AHRF. Will reschedule once patient is medically optimized for the procedure.

## 2022-08-10 NOTE — PROGRESS NOTE ADULT - ASSESSMENT
Patient is a 58 y/o F with a pmhx of lupus, hypothyroidism, antiphospholipid syndrome, HTN, anemia,  recently diagnosed  with  b/l adnexal masses and lung nodules, unknown primary disease (7/2022), presented with weakness and falls and admitted.      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  #Bilateral adnexal masses with para-aortic lymphadenopathy and pulmonary nodules suggestive of metastatic disease.  #Biopsy of Left upper lobe Lung nodule- Adenocarcinoma of mullerian origin.  PDL-1 and NGS pending.    -Ca 19-9, Ca125 normal  -CEA 5.5  -CA 15.3 and CA 27.29 pending.  -US Transvaginal : right 6.6 cm heterogeneous mixed solid-cystic mass and lower pelvic 5.6 cm heterogenous solid appearing may represent ovarian mass or degenerating fibroid.  -CT A/P W contrast 7/2022: B/l confluent hypodensities in b/l adnexae measuring 6.8 cm x 4.5 cm, paraaortic lymphadenopathy and b/l solid pulmonary nodules largest measuring 8mm.  -Pathology of Left upper lobe lung nodule :  Adenocarcinoma of mullerian origin adenocarcinoma, morphologically consistent with endometrioid adenocarcinoma. The possible primary sites include ovary, uterus or the peritoneum. While GATA3 staining can be seen in small percentage of endometrioid carcinoma, clinical correlation to rule out the possibility of breast carcinoma is recommended.    -PDL1 and NGS pending.  -Although the primary may be ovarian, however breast or other etiologies should be considered given the normal Ca 125. Apparently, the breast examination has been negative.  -MRI of head w contrast : unable to be obtained as pt is too big.  -CT head without contrast : No metastasis noted. However done without contrast.  -OBGYN recs repeat imaging of CT A/P with contrast and pathology for second opinion of pathology specimen.  -OBGYN recs no surgical intervention for now.  -GI was consulted due to elevated CEA.   --Unable to obtain of B/L breasts diagnostic mammogram as pt is unable to stand.     #Acute respiratory failure and shock.  #GOYO/ATN  -s/p intubation and on pressors.  -HD as per Nephrology.  -on Broad spectrum antibiotics.    #Acute drop in wbc count and platelet count likely 2/2 sepsis unlikely 2/2 to chemotherapy.  -Initially noted to have Leukocytosis and thrombocytosis   - Peripheral flow Normal.  - BCR-ABL, Jak2, CALR, MPL Negative for any mutations.  -Abx as per ID.  --Monitor cbc with differential.    #Microcytic anemia:  -Received 5 doses of Venofer for Iron deficiency anemia in 7/2022 during prior admission.    #Covid Pneumonia   h/o general weakness from previous covid infections, reports sxs improve about 1-2weeks after recovery  - 1st covid +ve 7/21 needs 21 days of isolation to 8/11  - s/p paxlovid as outpatient.    Recommendations.    -Completed cycle 1 of  chemotherapy with carboplatin and taxol  8/8/2022. Tolerated chemotherapy well.  -Chemotherapy causing acute drop in wbc count and platelet count is less likely.    -The side effects from chemotherapy have been discussed including but not limited to: Pain, hyponatremia, hypomagnesemia, hypocalcemia, hypokalemia, vomiting, abdominal pain, nausea and vomiting, bone marrow depression, anemia, leukopenia, neutropenia, thrombocytopenia, increased serum alkaline phosphatase, increased serum AST, hypersensitivity, weakness, decreased creatinine clearance, increased blood urea nitrogen; flushing, ECG abnormality, edema, hypotension, alopecia, skin rash, diarrhea, stomatitis, hemorrhage, hypersensitivity reaction, infection, injection site reaction, peripheral neuropathy, arthralgia, myalgia, asthenia, fever.    The chemotherapy dose was adjusted according to pt's height, weight, labs and anticipated tolerance.  The high risk of complications and complexity associated with antineoplastic therapy administration has been explained to the pt.    -Further recommendations for treatment after the above issues clarified. Patient is a 58 y/o F with a pmhx of lupus, hypothyroidism, antiphospholipid syndrome, HTN, anemia,  recently diagnosed  with  b/l adnexal masses and lung nodules, unknown primary disease (7/2022), presented with weakness and falls and admitted.      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  #Bilateral adnexal masses with para-aortic lymphadenopathy and pulmonary nodules suggestive of metastatic disease.  #Biopsy of Left upper lobe Lung nodule- Adenocarcinoma of mullerian origin.  PDL-1 and NGS pending.    -Ca 19-9, Ca125 normal  -CEA 5.5  -CA 15.3 and CA 27.29 pending.  -US Transvaginal : right 6.6 cm heterogeneous mixed solid-cystic mass and lower pelvic 5.6 cm heterogenous solid appearing may represent ovarian mass or degenerating fibroid.  -CT A/P W contrast 7/2022: B/l confluent hypodensities in b/l adnexae measuring 6.8 cm x 4.5 cm, paraaortic lymphadenopathy and b/l solid pulmonary nodules largest measuring 8mm.  -Pathology of Left upper lobe lung nodule :  Adenocarcinoma of mullerian origin adenocarcinoma, morphologically consistent with endometrioid adenocarcinoma. The possible primary sites include ovary, uterus or the peritoneum. While GATA3 staining can be seen in small percentage of endometrioid carcinoma, clinical correlation to rule out the possibility of breast carcinoma is recommended.    -PDL1 and NGS pending.  -Although the primary may be ovarian, however breast or other etiologies should be considered given the normal Ca 125. Apparently, the breast examination has been negative.  -MRI of head w contrast : unable to be obtained as pt is too big.  -CT head without contrast : No metastasis noted. However done without contrast.  -OBGYN recs repeat imaging of CT A/P with contrast and pathology for second opinion of pathology specimen.  -OBGYN recs no surgical intervention for now.  -GI was consulted due to elevated CEA.   --Unable to obtain of B/L breasts diagnostic mammogram as pt is unable to stand.     #Acute respiratory failure and shock.  #GOYO/ATN  -s/p intubation and on pressors.  -HD as per Nephrology.  -on Broad spectrum antibiotics.    #Acute drop in wbc count and platelet count likely 2/2 sepsis unlikely 2/2 to chemotherapy.  -Initially noted to have Leukocytosis and thrombocytosis   - Peripheral flow Normal.  - BCR-ABL, Jak2, CALR, MPL Negative for any mutations.  -Abx as per ID.  --Monitor cbc with differential.    #Microcytic anemia:  -Received 5 doses of Venofer for Iron deficiency anemia in 7/2022 during prior admission.    #Covid Pneumonia   h/o general weakness from previous covid infections, reports sxs improve about 1-2weeks after recovery  - 1st covid +ve 7/21 needs 21 days of isolation to 8/11  - s/p paxlovid as outpatient.    Recommendations.    -Completed cycle 1 of  chemotherapy with carboplatin and taxol  8/8/2022. Tolerated chemotherapy well.  -Chemotherapy given 1-2 days ago would NOT cause an acute drop in wbc count and platelet count   - the etiology of the current events are not clear

## 2022-08-10 NOTE — PROGRESS NOTE ADULT - ATTENDING COMMENTS
seen/examined w/ hemonc team;note reviewed  discussed with GYN ONC  discussed with Pathology  etilogy of decompensation is not clear  prognosis is not good
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  Since her last admission there has been progression of disease at this point in time I agree with starting carboplatin and Taxol with my colleagues.  Granted this is indeed an unusual presentation of mullarian  cancer IHC staining support this as primary.  No objections to have pathology results reviewed in outside institution for confirmation as recommended by GYN oncology.    Agree with obtaining a colonoscopy to rule out gastrointestinal primary, she did have intravenous iron due to suspected iron deficiency given the iron saturation was low but ferritin is in the normal range anemia of chronic disease is also possible.    There is no breast masses and can obtain mammogram and ultrasound as outpatient.    Leukocytosis likely reactive due to her malignancy given negative myeloproliferative work-up.    If colonoscopy finds a mass which is morphologically and by IHC similar to lung metastases and we could switch her palliative treatment to FOLFOX for example with next cycle.    She is pending NGS also suggest germline  BRCA testing as outpatient.    In summary we will start carboplatin and Taxol with palliative intent with first cycle today and will possibly adjust treatment in the future if the primary is deemed non Mullarian in origin
seen/examined w/ HEMONC team; note reviewed; case discussed  - discussed with pathology  - GYN ONC need to see the patient and evaluate for either ovarian or endometrial biopsy in view of lung biopsy  - unclear about the value of tumor markers in making diagnosis: mildly elevated CEA should be certainly explored by GI; however, unlikely play a role in changing existing diagnosis  - obtain mammogram and ultrasound to further explore the diagnosis  - s/p 1st dose of carbo/taxol 8/8/22
I edited the note .   Time-based billing (NON-critical care).   35 minutes spent on total encounter; more than 50% of the visit was spent counseling and / or coordinating care by the attending physician.  The necessity of the time spent during the encounter on this date of service was due to:   Coordination of care.
Patient also seen by myself. I agree with the Hem-Onc fellow's note above.  Situation discussed with her and the patient.  Modifications made to the note above.

## 2022-08-10 NOTE — CONSULT NOTE ADULT - CONSULT REASON
Ovarian masses paraaortic lymphadenopathy  and lung nodules unknown primary.
r/o GI malignancy
GOYO with metabolic acidosis
Septic Shock
Acute Hypoxemic Respiratory Failure  Acute Renal failure  TLS
adnexal masses with lung nodules, preliminary biopsy showing adenocarcinoma

## 2022-08-10 NOTE — CONSULT NOTE ADULT - ATTENDING COMMENTS
56 y/o F with PMHx of lupus, hypothyroidism, antiphospholipid syndrome, HTN, and anemia complaining of pelvic and back pain for 3 weeks with decreased appetite and vomiting for 8 days admitted for progressive generalized weakness and fall found to have malignancy adenocarcinoma of mullerian origin with mets to lungs s/p chemo session on 8/8 with carbo and taxol. She developed GOYO the second day and today she was in shock with severe metabolic acidosis. She was upgraded to ICU, intubated and started on pressors.     # GOYO ATN shock HAGMA- lactic acidosis  / stage 4 cancer mullerian sp chemotx / resp failure  on MV now   sp bicarb boluses / Can use lasix 80 mg IV one time   doubt TLS / follow uric acid K and IP   follow UOP and BMP ABG   Will most likely need CVVHD TONIGHT WILL FOLLOW IN 2-3 H   dose antibx to GFR < 15  sono no hydro   check IP     will follow

## 2022-08-10 NOTE — CONSULT NOTE ADULT - SUBJECTIVE AND OBJECTIVE BOX
Patient is a 57y old  Female who presents with a chief complaint of Weakness, diarrhea, mullerian cancer with metastastis (09 Aug 2022 17:26)    HPI:   58 y/o Female Pt  with a pmhx of lupus, hypothyroidism, antiphospholipid syndrome, HTN, anemia who recently dx with metastatic cancer , hypercalcemia, lymphoma , covid treated with paxlovid and morbidily obese. Presents to the ED with increased weakness and falling at home. no head injury or headaches. Patient was unable to lift herself off floor but was unable to get up. patient's   is unable to lift patient due to recent cataract surgery. In the ED, pt was noted to have an elevated WBC count of 49, increased from 28.77 from prior, GOYO noted, pt is covid positive however was recently rx with paxlovid. Pt was seen and examined at bedside, pt denies vomiting, diarrhea, abdominal pain or back pain. no sob, chest pain or weight loss. no rashes (30 Jul 2022 23:37)    PAST MEDICAL & SURGICAL HISTORY:  Lupus  Hypothyroidism  Iron deficiency anemia  HTN  Anemia  COVID  Morbidly obese    SOCIAL HX:   Non smoker       No EtOH abuse         No illicit drug use    FAMILY HISTORY:  No pertinent family history in first degree relatives    Review Of Systems:   All ROS are negative except per HPI     Allergies  NSAIDs (Stomach Upset)    Intolerances    PHYSICAL EXAM  ICU Vital Signs Last 24 Hrs  T(C): 36.1 (10 Aug 2022 07:00), Max: 36.4 (09 Aug 2022 19:59)  T(F): 96.9 (10 Aug 2022 07:00), Max: 97.5 (09 Aug 2022 19:59)  HR: 111 (10 Aug 2022 07:00) (102 - 111)  BP: 92/51 (10 Aug 2022 07:00) (92/51 - 123/57)  RR: 18 (10 Aug 2022 07:00) (18 - 18)  SpO2: 96% (09 Aug 2022 19:59) (96% - 96%)    O2 Parameters below as of 09 Aug 2022 19:59  Patient On (Oxygen Delivery Method): room air    CONSTITUTIONAL:  Ill appearing    ENT:   Airway patent,   Mouth with normal mucosa.     EYES:   pupils equal,   round and reactive to light.    CARDIAC:   Tachycardic  Regular rhythm.    Heart sounds S1, S2.     RESPIRATORY:   No wheezing   Normal chest expansion  No use of accessory muscles    GASTROINTESTINAL:  Abdomen soft   Non-tender,   No guarding,   + BS    MUSCULOSKELETAL:   Range of motion is not limited,  No clubbing, cyanosis    NEUROLOGICAL:   AAOx2  No motor or sensory deficits.    SKIN:   Skin normal color for race,   No evidence of rash.    PSYCHIATRIC:   No apparent risk to self or others.        LABS:                        9.0    30.93 )-----------( 247      ( 09 Aug 2022 08:19 )             27.7     137  |  99  |  36<H>  ----------------------------<  102<H>  4.1   |  14<L>  |  2.2<H>    Ca    8.4<L>      09 Aug 2022 08:19  Phos  3.6     08-09  Mg     2.1     08-09    TPro  5.5<L>  /  Alb  2.6<L>  /  TBili  1.3<H>  /  DBili  x   /  AST  101<H>  /  ALT  22  /  AlkPhos  1588<H>  08-09                           CARDIAC MARKERS ( 10 Aug 2022 08:34 )  x     / <0.01 ng/mL / x     / x     / x        LIVER FUNCTIONS - ( 09 Aug 2022 08:19 )  Alb: 2.6 g/dL / Pro: 5.5 g/dL / ALK PHOS: 1588 U/L / ALT: 22 U/L / AST: 101 U/L / GGT: x                                              ABG - ( 10 Aug 2022 09:29 )  pH, Arterial: 7.16  pH, Blood: x     /  pCO2: 27    /  pO2: 51    / HCO3: 10    / Base Excess: -17.6 /  SaO2: 73.0        CXR reviewed      MEDICATIONS  (STANDING):  allopurinol 300 milliGRAM(s) Oral daily  aspirin  chewable 81 milliGRAM(s) Oral daily  dextrose 5% + sodium chloride 0.45% 1000 milliLiter(s) (100 mL/Hr) IV Continuous <Continuous>  DULoxetine 60 milliGRAM(s) Oral daily  enoxaparin Injectable 40 milliGRAM(s) SubCutaneous every 24 hours  ferrous    sulfate 325 milliGRAM(s) Oral daily  hydroxychloroquine 200 milliGRAM(s) Oral two times a day  lactated ringers Bolus 1000 milliLiter(s) IV Bolus once  levothyroxine 150 MICROGram(s) Oral daily  loperamide 2 milliGRAM(s) Oral every 4 hours  meropenem  IVPB 1000 milliGRAM(s) IV Intermittent once  norepinephrine Infusion 0.05 MICROgram(s)/kG/Min (16.5 mL/Hr) IV Continuous <Continuous>  nystatin Powder 1 Application(s) Topical two times a day  sodium bicarbonate 650 milliGRAM(s) Oral every 8 hours  sodium chloride 0.9% Bolus 1000 milliLiter(s) IV Bolus once  sodium chloride 0.9%. 1000 milliLiter(s) (75 mL/Hr) IV Continuous <Continuous>  vancomycin  IVPB 2000 milliGRAM(s) IV Intermittent once    MEDICATIONS  (PRN):  oxyCODONE    IR 5 milliGRAM(s) Oral every 4 hours PRN Moderate Pain (4 - 6)  oxyCODONE    IR 7.5 milliGRAM(s) Oral every 4 hours PRN Severe Pain (7 - 10)  simethicone 80 milliGRAM(s) Chew four times a day PRN Gas Patient is a 57y old  Female who presents with a chief complaint of Weakness, diarrhea, mullerian cancer with metastastis (09 Aug 2022 17:26)    HPI:   58 y/o Female Pt  with a pmhx of lupus, hypothyroidism, antiphospholipid syndrome, HTN, anemia who recently dx with metastatic cancer , hypercalcemia, lymphoma , covid treated with paxlovid and morbidily obese. Presents to the ED with increased weakness and falling at home. no head injury or headaches. Patient was unable to lift herself off floor but was unable to get up. patient's   is unable to lift patient due to recent cataract surgery. In the ED, pt was noted to have an elevated WBC count of 49, increased from 28.77 from prior, GOYO noted, pt is covid positive however was recently rx with paxlovid. Pt was seen and examined at bedside, pt denies vomiting, diarrhea, abdominal pain or back pain. no sob, chest pain or weight loss. no rashes (30 Jul 2022 23:37)    PAST MEDICAL & SURGICAL HISTORY:  Lupus  Hypothyroidism  Iron deficiency anemia  HTN  Anemia  COVID  Morbidly obese    SOCIAL HX:   Non smoker       No EtOH abuse         No illicit drug use    FAMILY HISTORY:  No pertinent family history in first degree relatives    Review Of Systems:   All ROS are negative except per HPI     Allergies  NSAIDs (Stomach Upset)    Intolerances    PHYSICAL EXAM  ICU Vital Signs Last 24 Hrs  T(C): 36.1 (10 Aug 2022 07:00), Max: 36.4 (09 Aug 2022 19:59)  T(F): 96.9 (10 Aug 2022 07:00), Max: 97.5 (09 Aug 2022 19:59)  HR: 111 (10 Aug 2022 07:00) (102 - 111)  BP: 92/51 (10 Aug 2022 07:00) (92/51 - 123/57)  RR: 18 (10 Aug 2022 07:00) (18 - 18)  SpO2: 96% (09 Aug 2022 19:59) (96% - 96%)    O2 Parameters below as of 09 Aug 2022 19:59  Patient On (Oxygen Delivery Method): room air    CONSTITUTIONAL:  Ill appearing    ENT:   Airway patent,   Mouth with normal mucosa.     EYES:   pupils equal,   round and reactive to light.    CARDIAC:   Tachycardic  Regular rhythm.    Heart sounds S1, S2.     RESPIRATORY:   No wheezing   Normal chest expansion  acute respiratory distress and tachypnea    GASTROINTESTINAL:  Abdomen soft   Non-tender,   No guarding,   + BS    MUSCULOSKELETAL:   Range of motion is not limited,  No clubbing, cyanosis    NEUROLOGICAL:   AAOx2  No motor or sensory deficits.    SKIN:   Skin normal color for race,   No evidence of rash.    PSYCHIATRIC:   No apparent risk to self or others.        LABS:                        9.0    30.93 )-----------( 247      ( 09 Aug 2022 08:19 )             27.7     137  |  99  |  36<H>  ----------------------------<  102<H>  4.1   |  14<L>  |  2.2<H>    Ca    8.4<L>      09 Aug 2022 08:19  Phos  3.6     08-09  Mg     2.1     08-09    TPro  5.5<L>  /  Alb  2.6<L>  /  TBili  1.3<H>  /  DBili  x   /  AST  101<H>  /  ALT  22  /  AlkPhos  1588<H>  08-09                           CARDIAC MARKERS ( 10 Aug 2022 08:34 )  x     / <0.01 ng/mL / x     / x     / x        LIVER FUNCTIONS - ( 09 Aug 2022 08:19 )  Alb: 2.6 g/dL / Pro: 5.5 g/dL / ALK PHOS: 1588 U/L / ALT: 22 U/L / AST: 101 U/L / GGT: x                                              ABG - ( 10 Aug 2022 09:29 )  pH, Arterial: 7.16  pH, Blood: x     /  pCO2: 27    /  pO2: 51    / HCO3: 10    / Base Excess: -17.6 /  SaO2: 73.0        CXR reviewed      MEDICATIONS  (STANDING):  allopurinol 300 milliGRAM(s) Oral daily  aspirin  chewable 81 milliGRAM(s) Oral daily  dextrose 5% + sodium chloride 0.45% 1000 milliLiter(s) (100 mL/Hr) IV Continuous <Continuous>  DULoxetine 60 milliGRAM(s) Oral daily  enoxaparin Injectable 40 milliGRAM(s) SubCutaneous every 24 hours  ferrous    sulfate 325 milliGRAM(s) Oral daily  hydroxychloroquine 200 milliGRAM(s) Oral two times a day  lactated ringers Bolus 1000 milliLiter(s) IV Bolus once  levothyroxine 150 MICROGram(s) Oral daily  loperamide 2 milliGRAM(s) Oral every 4 hours  meropenem  IVPB 1000 milliGRAM(s) IV Intermittent once  norepinephrine Infusion 0.05 MICROgram(s)/kG/Min (16.5 mL/Hr) IV Continuous <Continuous>  nystatin Powder 1 Application(s) Topical two times a day  sodium bicarbonate 650 milliGRAM(s) Oral every 8 hours  sodium chloride 0.9% Bolus 1000 milliLiter(s) IV Bolus once  sodium chloride 0.9%. 1000 milliLiter(s) (75 mL/Hr) IV Continuous <Continuous>  vancomycin  IVPB 2000 milliGRAM(s) IV Intermittent once    MEDICATIONS  (PRN):  oxyCODONE    IR 5 milliGRAM(s) Oral every 4 hours PRN Moderate Pain (4 - 6)  oxyCODONE    IR 7.5 milliGRAM(s) Oral every 4 hours PRN Severe Pain (7 - 10)  simethicone 80 milliGRAM(s) Chew four times a day PRN Gas

## 2022-08-10 NOTE — CONSULT NOTE ADULT - ASSESSMENT
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  Monitor MS.    HEENT: Oral care    PULMONARY:  HOB @ 45 degrees.  Aspiration precautions.  Supplemental O2 as needed.  Target POx > 94%.  ABG noted.  Low threshold for intubation.    CARDIOVASCULAR:  SP 2L IVF.  Start Bicarb drip.  Continue Norepinephrine.  Add Vaso.  Target MAP > 65.  CE x 2.  ECHO.  Strict I & O.  Place A-line    GI:  GI prophylaxis.   NPO.   Bowel regimen.  Trend LFTs.    RENAL:  Monitor pH & LA.  Monitor ionized Ca.  Start Bicarb drip 3 amps at 150cc/hr.  Place [place] and Nephro evaluation for emergent HD.  Check CK.  Follow up lytes.  Correct as needed.  Place Bermudez.  Start Phoslo.      INFECTIOUS DISEASE:  c/w Vancomycin & Meropenem, adjust doses to GFR.  MRSA nares.  Panculture.  Procalcitonin.  Fungitell. Follow up cultures and sensitivities.  ID eval.    HEMATOLOGICAL:  Rasburicase 0.2mg/kg for 5 days.  Check serum Uric Acid & LDH.  DVT prophylaxis.  Oncology FU.  DIC panel.  LE-VDUS.    ENDOCRINE:  Follow up FS.  Insulin protocol if needed.   q8h.    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  Monitor MS.    HEENT: Oral care    PULMONARY:  HOB @ 45 degrees.  Aspiration precautions.  Supplemental O2 as needed.  Target POx > 94%.  ABG noted.  Low threshold for intubation.    CARDIOVASCULAR:  In shock SP 2L IVF.  Start Bicarb drip.  Continue Norepinephrine.  Add Vaso.  Target MAP > 65.  CE x 2.  ECHO.  Strict I & O.  Place A-line    GI:  GI prophylaxis.   NPO.   Bowel regimen.  Trend LFTs.    RENAL:  Monitor pH & LA.  Monitor ionized Ca.  Start Bicarb drip 3 amps at 150cc/hr.  Place [place] and Nephro evaluation for emergent HD.  Check CK.  Follow up lytes.  Correct as needed.  Place Bermudez.  Start Phoslo.      INFECTIOUS DISEASE:  c/w Vancomycin & Meropenem, adjust doses to GFR.  MRSA nares.  Panculture.  Procalcitonin.  Fungitell. Follow up cultures and sensitivities.  ID eval.    HEMATOLOGICAL:  Rasburicase 0.2mg/kg for 5 days.  Check serum Uric Acid & LDH.  DVT prophylaxis.  Oncology FU.  DIC panel.  LE-VDUS.    ENDOCRINE:  Follow up FS.  Insulin protocol if needed.   q8h.    MUSCULOSKELETAL:  bed rest    Place TLC, [place] & A-line  Very poor overall prognosis  Advanced directives and goals of care  Monitor in MICU

## 2022-08-10 NOTE — CONSULT NOTE ADULT - CRITICAL CARE ATTENDING COMMENT
This patient is critically ill due to the following:  * Hemodynamic instability requiring titration of vasopressors or other vasoactive agents  * Multiple organ failure requiring complex decision-making, and there is a high probability of imminent or life-threatening deterioration in the patient’s condition  * The patient required frequent reassessments and monitoring to ensure response to interventions and therapies.    Critical care time includes time spent evaluating and treating the patient's acute illness as well as time spent reviewing labs, radiology,  and discussing the case with a multidisciplinary team in an effort to prevent further life threatening deterioration or end organ damage. This time is independent of any procedures performed.

## 2022-08-10 NOTE — CONSULT NOTE ADULT - ATTENDING COMMENTS
Patient evaluated on the floors for evaluation for ICU admission. Patient receives chemotherapy on monday, and has developed renal failure beginning yesterday, and today has had acute rapid decompensation including rapidly developing acidosis, shock, altered mental status, and respiratory distress.  Unfortunately due to the rapid deterioration, the differential is very broad. Most likely this represents gram-negative septic shock, however tumor lysis syndrome, acute thrombosis, massive pulmonary embolism, or less likely viscous rupture due to malignancy are all worth ruling out.  Recommend immediate upgrade to ICU level of care, likely intubation due to respiratory distress and inability to compensate for metabolic acidosis. When stabilized, should obtain CT pulmonary embolism protocol (with simultaneous abdominal imaging), and should undergo echocardiogram as soon as possible.  Bilateral lower extremity duplex, and follow up troponin and BNP as well. Agree with broad spectrum antibiotics.    I discussed transitions of care and current therapies with the medicine team.  I agree with the fellow note, with the exceptions listed in my attestation above.  The remainder of impression and plan per fellow note.

## 2022-08-10 NOTE — PROCEDURE NOTE - NSPOSTCAREGUIDE_GEN_A_CORE
Verbal/written post procedure instructions were given to patient/caregiver/Instructed patient/caregiver to follow-up with primary care physician/Instructed patient/caregiver regarding signs and symptoms of infection/Keep the cast/splint/dressing clean and dry/Care for catheter as per unit/ICU protocols
Care for catheter as per unit/ICU protocols

## 2022-08-10 NOTE — PROCEDURE NOTE - NSPROCDETAILS_GEN_ALL_CORE
ultrasound guidance
location identified, draped/prepped, sterile technique used, needle inserted/introduced/positive blood return obtained via catheter/connected to a pressurized flush line/sutured in place/hemostasis with direct pressure, dressing applied/all materials/supplies accounted for at end of procedure
Guidewire position within R IJ confirmed via US prior to vessel dilation/guidewire recovered/lumen(s) aspirated and flushed/sterile dressing applied/sterile technique, catheter placed/ultrasound guidance with use of sterile gel and probe cove
guidewire recovered/lumen(s) aspirated and flushed/sterile dressing applied/sterile technique, catheter placed/ultrasound guidance with use of sterile gel and probe cove

## 2022-08-10 NOTE — PROGRESS NOTE ADULT - SUBJECTIVE AND OBJECTIVE BOX
KELSIE JUAREZ 57y Female  MRN#: [id number]     SUBJECTIVE  Patient is a 57y old Female who presents with a chief complaint of Weakness, diarrhea, mullerian cancer with metastastis (09 Aug 2022 17:26)      Today is hospital day 11d, and this morning she is lying in bed without distress.   No acute overnight events.     OBJECTIVE  PAST MEDICAL & SURGICAL HISTORY  Lupus    Hypothyroidism    Iron deficiency anemia    No significant past surgical history      ALLERGIES:  NSAIDs (Stomach Upset)    MEDICATIONS:  STANDING MEDICATIONS  allopurinol 300 milliGRAM(s) Oral daily  aspirin  chewable 81 milliGRAM(s) Oral daily  chlorhexidine 0.12% Liquid 15 milliLiter(s) Oral Mucosa every 12 hours  chlorhexidine 2% Cloths 1 Application(s) Topical <User Schedule>  CRRT Treatment    <Continuous>  dextrose 5% 1000 milliLiter(s) IV Continuous <Continuous>  DULoxetine 60 milliGRAM(s) Oral daily  enoxaparin Injectable 40 milliGRAM(s) SubCutaneous every 24 hours  fentaNYL   Infusion. 0.5 MICROgram(s)/kG/Hr IV Continuous <Continuous>  ferrous    sulfate 325 milliGRAM(s) Oral daily  hydrocortisone sodium succinate Injectable 100 milliGRAM(s) IV Push every 8 hours  hydroxychloroquine 200 milliGRAM(s) Oral two times a day  levothyroxine 150 MICROGram(s) Oral daily  loperamide 2 milliGRAM(s) Oral every 4 hours  midazolam Infusion 0.02 mG/kG/Hr IV Continuous <Continuous>  norepinephrine Infusion 0.05 MICROgram(s)/kG/Min IV Continuous <Continuous>  nystatin Powder 1 Application(s) Topical two times a day  pantoprazole  Injectable 40 milliGRAM(s) IV Push daily  PureFlow Dialysate RFP-400 (K 2 / Ca 3) 5000 milliLiter(s) CRRT <Continuous>  vasopressin Infusion 0.04 Unit(s)/Min IV Continuous <Continuous>    PRN MEDICATIONS  simethicone 80 milliGRAM(s) Chew four times a day PRN    HOME MEDICATIONS  Home Medications:  aspirin 81 mg oral tablet, chewable: 1 tab(s) orally once a day (22 Jul 2022 13:38)  DULoxetine 60 mg oral delayed release capsule: 1 cap(s) orally once a day (14 Jul 2022 22:46)  hydroxychloroquine 200 mg oral tablet: 1 tab(s) orally 2 times a day (14 Jul 2022 22:44)  lisinopril 40 mg oral tablet: 1 tab(s) orally once a day (14 Jul 2022 22:44)  oxycodone-acetaminophen 5 mg-325 mg oral tablet: 1 tab(s) orally every 4 hours, As needed, Moderate Pain (4 - 6) (22 Jul 2022 13:38)  Synthroid 150 mcg (0.15 mg) oral tablet: 1 tab(s) orally once a day (14 Jul 2022 22:46)      VITAL SIGNS: Last 24 Hours  T(C): 37.4 (10 Aug 2022 12:00), Max: 37.4 (10 Aug 2022 12:00)  T(F): 99.3 (10 Aug 2022 12:00), Max: 99.3 (10 Aug 2022 12:00)  HR: 118 (10 Aug 2022 15:30) (102 - 132)  BP: 204/144 (10 Aug 2022 12:34) (45/32 - 204/144)  BP(mean): 35 (10 Aug 2022 13:50) (35 - 162)  RR: 25 (10 Aug 2022 15:30) (18 - 46)  SpO2: 93% (10 Aug 2022 15:30) (78% - 100%)    08-10-22 @ 07:01  -  08-10-22 @ 16:32  --------------------------------------------------------  IN: 3541.5 mL / OUT: 80 mL / NET: 3461.5 mL        LABS:                        8.4    8.49  )-----------( 176      ( 10 Aug 2022 07:46 )             26.8     08-10    132<L>  |  95<L>  |  50<H>  ----------------------------<  255<H>  5.1<H>   |  10<L>  |  3.0<H>    Ca    7.7<L>      10 Aug 2022 11:55  Phos  7.0     08-10  Mg     2.6     08-10    TPro  4.9<L>  /  Alb  2.3<L>  /  TBili  1.3<H>  /  DBili  1.0<H>  /  AST  637<H>  /  ALT  62<H>  /  AlkPhos  1396<H>  08-10    LIVER FUNCTIONS - ( 10 Aug 2022 11:55 )  Alb: 2.3 g/dL / Pro: 4.9 g/dL / ALK PHOS: 1396 U/L / ALT: 62 U/L / AST: 637 U/L / GGT: x           PT/INR - ( 10 Aug 2022 11:55 )   PT: 16.10 sec;   INR: 1.40 ratio         PTT - ( 10 Aug 2022 11:55 )  PTT:30.4 sec    ABG - ( 10 Aug 2022 14:21 )  pH, Arterial: 7.03  pH, Blood: x     /  pCO2: 38    /  pO2: 157   / HCO3: 10    / Base Excess: -20.1 /  SaO2: 100.0             Creatine Kinase, Serum: 441 U/L *H* (08-10-22 @ 09:48)  Lactate, Blood: 12.2 mmol/L *HH* (08-10-22 @ 09:40)  Troponin T, Serum: <0.01 ng/mL (08-10-22 @ 08:34)      CARDIAC MARKERS ( 10 Aug 2022 09:48 )  x     / x     / 441 U/L / x     / x      CARDIAC MARKERS ( 10 Aug 2022 08:34 )  x     / <0.01 ng/mL / x     / x     / x          CAPILLARY BLOOD GLUCOSE      POCT Blood Glucose.: 102 mg/dL (10 Aug 2022 08:30)      RADIOLOGY:    PHYSICAL EXAM:  PHYSICAL EXAM:  GENERAL: NAD, AAO x 4, 57y F  HEAD:  Atraumatic, Normocephalic  EYES: EOMI, conjunctivae clear and sclerae white  NECK: Supple, No JVD  CHEST/LUNG: Clear to auscultation bilaterally; No wheeze; No crackles; No accessory muscles used  HEART: Regular rate and rhythm; No murmurs;   ABDOMEN: Soft, Nontender, Nondistended; Bowel sounds present; No guarding  EXTREMITIES:  2+ Peripheral Pulses, No cyanosis or edema  NEUROLOGY: non-focal    ADMISSION SUMMARY  Patient is a 57y old Female who presents with a chief complaint of Weakness, diarrhea, mullerian cancer with metastastis (09 Aug 2022 17:26)

## 2022-08-11 NOTE — DISCHARGE NOTE FOR THE EXPIRED PATIENT - HOSPITAL COURSE
58 y/o Female Pt  with a pmhx of lupus, hypothyroidism, antiphospholipid syndrome, HTN, anemia who recently dx with metastatic cancer , hypercalcemia, lymphoma , covid treated with paxlovid and morbidily obese. Presents to the ED 7/30 with increased weakness and falling at home. no head injury or headaches. Patient was unable to lift herself off floor but was unable to get up. patient's   is unable to lift patient due to recent cataract surgery. In the ED, pt was noted to have an elevated WBC count of 49, increased from 28.77 from prior, GOYO noted, pt is covid positive however was recently rx with paxlovid. Pt was seen and examined at bedside, pt denies vomiting, diarrhea, abdominal pain or back pain. no sob, chest pain or weight loss. no rashes    Patient was found to have septic shock and GOYO with mild HAGMA. Patient was treated with allopurinol for GOYO with uremia and antibiotics for sepsis with suspected UTI. GOYO improved with treatment but patient remained septic. Stable until 8/10 when patient was intubated and upgraded to ICU for management of worsening septic shock. In the ICU, patient continued to deteriorate requiring 5 pressors for management of septic shock.

## 2022-08-12 LAB
GIANT PLATELETS BLD QL SMEAR: PRESENT — SIGNIFICANT CHANGE UP
GRAM STN FLD: SIGNIFICANT CHANGE UP
METHOD TYPE: SIGNIFICANT CHANGE UP
MSSA DNA SPEC QL NAA+PROBE: SIGNIFICANT CHANGE UP
POIKILOCYTOSIS BLD QL AUTO: SIGNIFICANT CHANGE UP
POLYCHROMASIA BLD QL SMEAR: SLIGHT — SIGNIFICANT CHANGE UP
SCHISTOCYTES BLD QL AUTO: SLIGHT — SIGNIFICANT CHANGE UP

## 2022-08-13 LAB
-  AMPICILLIN/SULBACTAM: SIGNIFICANT CHANGE UP
-  CEFAZOLIN: SIGNIFICANT CHANGE UP
-  CLINDAMYCIN: SIGNIFICANT CHANGE UP
-  ERYTHROMYCIN: SIGNIFICANT CHANGE UP
-  GENTAMICIN: SIGNIFICANT CHANGE UP
-  OXACILLIN: SIGNIFICANT CHANGE UP
-  PENICILLIN: SIGNIFICANT CHANGE UP
-  RIFAMPIN: SIGNIFICANT CHANGE UP
-  TETRACYCLINE: SIGNIFICANT CHANGE UP
-  TRIMETHOPRIM/SULFAMETHOXAZOLE: SIGNIFICANT CHANGE UP
-  VANCOMYCIN: SIGNIFICANT CHANGE UP
CULTURE RESULTS: SIGNIFICANT CHANGE UP
METHOD TYPE: SIGNIFICANT CHANGE UP
ORGANISM # SPEC MICROSCOPIC CNT: SIGNIFICANT CHANGE UP
SPECIMEN SOURCE: SIGNIFICANT CHANGE UP

## 2022-08-16 ENCOUNTER — APPOINTMENT (OUTPATIENT)
Dept: HEMATOLOGY ONCOLOGY | Facility: CLINIC | Age: 57
End: 2022-08-16

## 2023-03-27 NOTE — ED ADULT NURSE NOTE - NSICDXPASTMEDICALHX_GEN_ALL_CORE_FT
Sent a [employer] message to mother of patient regarding allergy shots- patient received allergy shots on 2.22.23 and the 35th day to receive injections without reducing would be 3/29/23. Will need to adjust does when patient returns from vacation. PAST MEDICAL HISTORY:  Hypothyroidism     Iron deficiency anemia     Lupus

## 2023-03-28 NOTE — DISCHARGE NOTE NURSING/CASE MANAGEMENT/SOCIAL WORK - PATIENT PORTAL LINK FT
You can access the FollowMyHealth Patient Portal offered by [facility] by registering at the following website: [email or web address]. By joining [email or web address]’s FollowMyHealth portal, you will also be able to view your health information using other applications (apps) compatible with our system. Age appropriate behavior/Recognizes caregiver

## 2023-04-14 NOTE — PATIENT PROFILE ADULT - BILL PAYMENT
Normal breath sounds. Abdominal:      Palpations: Abdomen is soft. Tenderness: There is no abdominal tenderness. Musculoskeletal:      Comments: Patient is a positive straight leg raise test on the left. She is otherwise normal distal pulses, motor and sensation. No saddle anesthesia or evidence of cauda equina syndrome. Motor is 5 out of 5 bilateral lower extremity and sensation is normal.     Skin:     General: Skin is warm and dry. Neurological:      Mental Status: She is alert. GCS: GCS eye subscore is 4. GCS verbal subscore is 5. GCS motor subscore is 6. Psychiatric:         Speech: Speech normal.         DIFFERENTIAL DIAGNOSIS/ MDM:     At this time the plan will be to CT scan the lumbar spine. Clinically she appears well and otherwise nontoxic or septic. We will also give 1 Percocet and start steroids. She does not have diabetes. DIAGNOSTIC RESULTS     EKG: All EKG's are interpreted by the Emergency Department Physician who either signs or Co-signs this chart in the absence of a cardiologist.        RADIOLOGY:   Interpretation per the Radiologist below, if available at the time of this note:  CT LUMBAR SPINE WO CONTRAST   Final Result   No acute abnormality of the lumbar spine. Partially visualized large cystic lesion likely rising from the right ovary. Consideration for follow-up pelvic ultrasound recommended for further   characterization. Advanced multilevel degenerative disc disease. Mild retrolisthesis at L1-L2,   L2-L3, and L5-S1, likely degenerative. No results found. LABS:  No results found for this visit on 04/14/23.     EMERGENCY DEPARTMENT COURSE:     The patient was given the following medications:  Orders Placed This Encounter   Medications    predniSONE (DELTASONE) tablet 40 mg    oxyCODONE-acetaminophen (PERCOCET) 5-325 MG per tablet 1 tablet    DISCONTD: predniSONE (DELTASONE) 10 MG tablet     Sig: Take 4 tablets by mouth once daily for 5 no

## 2023-12-05 NOTE — PROCEDURE NOTE - NSCVLATTEMPTSITEVASC_A_CORE
Patient called in to follow up on medication. Please call her back after 12:30pm. She is leaving for an errand now.   
left/femoral vein
right/internal jugular

## 2024-07-22 NOTE — DISCHARGE NOTE FOR THE EXPIRED PATIENT - REASON FOR ADMISSION
Septic Shock
Detail Level: Zone
Continue Regimen: Ilumya 100mg/ml syringe injection - every 12 weeks
Render In Strict Bullet Format?: No
Plan: Patient's pulmonologist did fax us her chest X-ray and her bloodwork results as well. All is WNL. We will defer obtaining labs today and reevaluate in 6 months. \\n\\nPatient is still having breathing issues, but she does have a hx of COPD and she is getting treated for this. \\n\\nRTC in 12 weeks as a nurse visit for the Ilumya injection

## 2024-10-30 NOTE — PROGRESS NOTE ADULT - REASON FOR ADMISSION
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 (Being Monitored For X): concerning skin lesions on a periodic basis